# Patient Record
Sex: FEMALE | Race: WHITE
[De-identification: names, ages, dates, MRNs, and addresses within clinical notes are randomized per-mention and may not be internally consistent; named-entity substitution may affect disease eponyms.]

---

## 2019-08-12 ENCOUNTER — HOSPITAL ENCOUNTER (EMERGENCY)
Dept: HOSPITAL 97 - ER | Age: 69
Discharge: HOME | End: 2019-08-12
Payer: COMMERCIAL

## 2019-08-12 VITALS — TEMPERATURE: 98.3 F | DIASTOLIC BLOOD PRESSURE: 66 MMHG | SYSTOLIC BLOOD PRESSURE: 138 MMHG

## 2019-08-12 VITALS — OXYGEN SATURATION: 96 %

## 2019-08-12 DIAGNOSIS — J45.909: ICD-10-CM

## 2019-08-12 DIAGNOSIS — I48.91: ICD-10-CM

## 2019-08-12 DIAGNOSIS — K57.32: Primary | ICD-10-CM

## 2019-08-12 DIAGNOSIS — E11.9: ICD-10-CM

## 2019-08-12 DIAGNOSIS — Z88.5: ICD-10-CM

## 2019-08-12 DIAGNOSIS — I10: ICD-10-CM

## 2019-08-12 DIAGNOSIS — E03.9: ICD-10-CM

## 2019-08-12 LAB
ALBUMIN SERPL BCP-MCNC: 3.5 G/DL (ref 3.4–5)
ALP SERPL-CCNC: 63 U/L (ref 45–117)
ALT SERPL W P-5'-P-CCNC: 29 U/L (ref 12–78)
AST SERPL W P-5'-P-CCNC: 18 U/L (ref 15–37)
BUN BLD-MCNC: 14 MG/DL (ref 7–18)
GLUCOSE SERPLBLD-MCNC: 124 MG/DL (ref 74–106)
HCT VFR BLD CALC: 35.6 % (ref 36–45)
INR BLD: 1.09
LYMPHOCYTES # SPEC AUTO: 1.5 K/UL (ref 0.7–4.9)
MAGNESIUM SERPL-MCNC: 2.1 MG/DL (ref 1.8–2.4)
NT-PROBNP SERPL-MCNC: 3189 PG/ML (ref ?–125)
PMV BLD: 8.2 FL (ref 7.6–11.3)
POTASSIUM SERPL-SCNC: 3.7 MMOL/L (ref 3.5–5.1)
RBC # BLD: 4.05 M/UL (ref 3.86–4.86)
TROPONIN (EMERG DEPT USE ONLY): < 0.02 NG/ML (ref 0–0.04)
UA COMPLETE W REFLEX CULTURE PNL UR: (no result)

## 2019-08-12 PROCEDURE — 81015 MICROSCOPIC EXAM OF URINE: CPT

## 2019-08-12 PROCEDURE — 83880 ASSAY OF NATRIURETIC PEPTIDE: CPT

## 2019-08-12 PROCEDURE — 99285 EMERGENCY DEPT VISIT HI MDM: CPT

## 2019-08-12 PROCEDURE — 96361 HYDRATE IV INFUSION ADD-ON: CPT

## 2019-08-12 PROCEDURE — 81003 URINALYSIS AUTO W/O SCOPE: CPT

## 2019-08-12 PROCEDURE — 87088 URINE BACTERIA CULTURE: CPT

## 2019-08-12 PROCEDURE — 85025 COMPLETE CBC W/AUTO DIFF WBC: CPT

## 2019-08-12 PROCEDURE — 84484 ASSAY OF TROPONIN QUANT: CPT

## 2019-08-12 PROCEDURE — 80076 HEPATIC FUNCTION PANEL: CPT

## 2019-08-12 PROCEDURE — 85610 PROTHROMBIN TIME: CPT

## 2019-08-12 PROCEDURE — 87186 SC STD MICRODIL/AGAR DIL: CPT

## 2019-08-12 PROCEDURE — 71045 X-RAY EXAM CHEST 1 VIEW: CPT

## 2019-08-12 PROCEDURE — 93005 ELECTROCARDIOGRAM TRACING: CPT

## 2019-08-12 PROCEDURE — 96365 THER/PROPH/DIAG IV INF INIT: CPT

## 2019-08-12 PROCEDURE — 36415 COLL VENOUS BLD VENIPUNCTURE: CPT

## 2019-08-12 PROCEDURE — 87086 URINE CULTURE/COLONY COUNT: CPT

## 2019-08-12 PROCEDURE — 74177 CT ABD & PELVIS W/CONTRAST: CPT

## 2019-08-12 PROCEDURE — 96368 THER/DIAG CONCURRENT INF: CPT

## 2019-08-12 PROCEDURE — 80048 BASIC METABOLIC PNL TOTAL CA: CPT

## 2019-08-12 PROCEDURE — 87077 CULTURE AEROBIC IDENTIFY: CPT

## 2019-08-12 PROCEDURE — 83735 ASSAY OF MAGNESIUM: CPT

## 2019-08-12 PROCEDURE — 96375 TX/PRO/DX INJ NEW DRUG ADDON: CPT

## 2019-08-12 NOTE — ER
Nurse's Notes                                                                                     

 [facility]                                                                 

Name: Jada Vazquez                                                                             

Age: 68 yrs                                                                                       

Sex: Female                                                                                       

: 1950                                                                                   

MRN: [id number]                                                                                   

Arrival Date: 2019                                                                          

Time: 12:25                                                                                       

Account#: [id number]                                                                            

Bed 28                                                                                            

Private MD: Sammy Pritchett B                                                                     

Diagnosis: Lower abdominal pain, unspecified;Diverticulitis of large intestine without perforation

  or abscess without bleeding                                                                     

                                                                                                  

Presentation:                                                                                     

                                                                                             

12:38 Presenting complaint: Patient states: yesterday afternoon, i started having lower abd   hj  

      pain and then last night it got bad, today, its lower R Q pain right at the top of my       

      thigh; reports nausea;. Transition of care: patient was not received from another           

      setting of care. Onset of symptoms was 2019. Risk Assessment: Do you want to     

      hurt yourself or someone else? Patient reports no desire to harm self or others.            

      Initial Sepsis Screen: Does the patient meet any 2 criteria? No. Patient's initial          

      sepsis screen is negative. Does the patient have a suspected source of infection? No.       

      Patient's initial sepsis screen is negative. Care prior to arrival: None.                   

12:38 Method Of Arrival: Ambulatory                                                             

12:38 Acuity: JOHNNY 3                                                                           hj  

                                                                                                  

Historical:                                                                                       

- Allergies:                                                                                      

12:40 Codeine;                                                                                hj  

12:40 Demerol;                                                                                hj  

12:40 Talwin (Anaphylaxis);                                                                   hj  

- PMHx:                                                                                           

12:40 Atrial Fib; Diabetes - NIDDM; Hypertension; Hypothyroidism; Reactive airway disease;    hj  

- PSHx:                                                                                           

12:40 bilateral knees; right ankle; right wrist; Cholecystectomy; ; Hernia repair;   hj  

                                                                                                  

- Immunization history:: Adult Immunizations up to date.                                          

- Social history:: Smoking status: unknown.                                                       

- Ebola Screening: : No symptoms or risks identified at this time.                                

                                                                                                  

                                                                                                  

Screenin:30 Abuse screen: Denies threats or abuse. Denies injuries from another. Nutritional        rv  

      screening: No deficits noted. Tuberculosis screening: No symptoms or risk factors           

      identified. Fall Risk None identified.                                                      

                                                                                                  

Assessment:                                                                                       

14:32 General: Appears in no apparent distress. comfortable, Behavior is calm, cooperative.   rv  

      Pain: Complains of pain in right low back and right lower quadrant. Neuro: Level of         

      Consciousness is awake, alert, obeys commands, Oriented to person, place, time,             

      situation. Cardiovascular: Patient's skin is warm and dry. Respiratory: Airway is           

      patent. GI: No signs and/or symptoms were reported involving the gastrointestinal           

      system. : No signs and/or symptoms were reported regarding the genitourinary system.      

      EENT: No signs and/or symptoms were reported regarding the EENT system. Derm: Skin is       

      intact. Musculoskeletal: No signs and/or symptoms reported regarding the                    

      musculoskeletal system.                                                                     

16:49 Reassessment: Patient appears in no apparent distress at this time. Patient and/or      rv  

      family updated on plan of care and expected duration. Pain level reassessed. Patient is     

      alert, oriented x 3, equal unlabored respirations, skin warm/dry/pink.                      

                                                                                                  

Vital Signs:                                                                                      

12:41  / 70; Pulse 87; Resp 18; Temp 98.6(TE); Pulse Ox 95% on R/A; Weight 101.15 kg;   hj  

      Height 5 ft. 3 in. (160.02 cm); Pain 5/10;                                                  

14:00  / 61; Pulse 79; Resp 15; Pulse Ox 96% ;                                          rv  

15:00  / 62; Pulse 83; Resp 15; Pulse Ox 96% on R/A;                                    rv  

17:12  / 66; Pulse 81; Resp 15; Temp 98.3; Pulse Ox 96% on R/A;                         rv  

12:41 Body Mass Index 39.50 (101.15 kg, 160.02 cm)                                              

                                                                                                  

ED Course:                                                                                        

12:25 Patient arrived in ED.                                                                  rg4 

12:27 Sammy Pritchett MD is Private Physician.                                                rg4 

12:36 Francisca Cummins FNP-C is [facility].                                                        snw 

12:36 Mike Ortiz MD is Attending Physician.                                                snw 

12:39 Triage completed.                                                                       hj  

12:41 Arm band placed on left wrist.                                                          hj  

13:37 Radiology exam delayed due to lab results not completed at this time. (BUN/Creatinine)  [id number] 

      IV insertion attempt and/or patient not having appropriate IV at this time.                 

13:56 Prince Roth, RN is Primary Nurse.                                                  rv  

14:14 Initial lab(s) drawn, by me, sent to lab. Inserted saline lock: 20 gauge in left        iw  

      antecubital area, using aseptic technique. Blood collected.                                 

14:18 EKG done, by EKG tech. reviewed by Francisca MEDELLIN.                               [id number] 

14:30 Patient has correct armband on for positive identification. Bed in low position. Call   rv  

      light in reach. Side rails up X 1. Cardiac monitor on. Pulse ox on. NIBP on.                

14:31 Radiology exam delayed due to lab results not completed at this time. (BUN/Creatinine). nj  

14:58 X-ray completed. Portable x-ray completed in exam room. Patient tolerated procedure     mh1 

      well.                                                                                       

15:00 XRAY Chest (1 view) In Process Unspecified.                                             EDMS

15:08 Patient moved to CT.                                                                    2 

15:30 CT Abd/Pelvis - IV Contrast Only In Process Unspecified.                                EDMS

17:12 No provider procedures requiring assistance completed. IV discontinued, intact,         rv  

      bleeding controlled, No redness/swelling at site. Pressure dressing applied.                

                                                                                                  

Administered Medications:                                                                         

02:25 Drug: Phenergan 6.25 mg Route: IVP; Site: left antecubital;                             rv  

17:11 Follow up: Response: No adverse reaction                                                rv  

14:31 Drug: NS 0.9% 1000 ml Route: IV; Rate: 75 ml/hr; Site: left antecubital;                rv  

17:11 Follow up: IV Status: Completed infusion                                                rv  

16:04 Drug: Ciprofloxacin 400 mg Volume: 200 ml; Route: IVPB; Infused Over: 60 mins; Site:    rv  

      left antecubital;                                                                           

17:10 Follow up: IV Status: Completed infusion; IV Intake: 200ml                              rv  

16:04 Drug: Flagyl 500 mg Volume: 100 ml; Route: IVPB; Rate: 200 ml/hr; Infused Over: 30      rv  

      mins; Site: left antecubital;                                                               

17:10 Follow up: IV Status: Completed infusion; IV Intake: 100ml                              rv  

16:04 Drug: NS 0.9% 500 ml Route: IV; Rate: bolus; Site: left antecubital;                    rv  

17:10 Follow up: IV Status: Completed infusion; IV Intake: 500ml                              rv  

                                                                                                  

                                                                                                  

Intake:                                                                                           

17:10 IV: 500ml; Total: 500ml.                                                                rv  

17:10 IV: 100ml; Total: 600ml.                                                                rv  

17:10 IV: 200ml; Total: 800ml.                                                                rv  

                                                                                                  

Outcome:                                                                                          

16:07 Discharge ordered by MD. mancia 

17:12 Discharged to home ambulatory.                                                          rv  

17:12 Condition: good                                                                             

17:12 Discharge instructions given to patient, Instructed on discharge instructions, follow       

      up and referral plans. medication usage, Demonstrated understanding of instructions,        

      follow-up care, medications, Prescriptions given X 3.                                       

17:13 Patient left the ED.                                                                    rv  

                                                                                                  

Signatures:                                                                                       

Dispatcher [employer]                           EDMS                                                 

Francisca Cummins, FNP-C                 FNP-Csnw                                                  

Micki De La Cruz                               1                                                  

Zulma Peña, RN                     RN   Saji Shields RN                      Erika Carrasco                                 4                                                  

Ricardo Alejandro Victoria                            2                                                  

Ana Iglesias                              3                                                  

Prince Roth RN RN rv Garcia, Jessica j6                                                  

                                                                                                  

Corrections: (The following items were deleted from the chart)                                    

12:42 12:41 Pulse 87bpm; Resp 18bpm; Pulse Ox 95% RA; Temp 98.6F Temporal; 101.15 kg; Height  hj  

      5 ft. 3 in.; BMI: 39.5; Pain 5/10; hj                                                       

                                                                                                  

**************************************************************************************************

## 2019-08-12 NOTE — EDPHYS
Physician Documentation                                                                           

 [facility]                                                                 

Name: Jada Vazquez                                                                             

Age: 68 yrs                                                                                       

Sex: Female                                                                                       

: 1950                                                                                   

MRN: [id number]                                                                                   

Arrival Date: 2019                                                                          

Time: 12:25                                                                                       

Account#: [id number]                                                                            

Bed 28                                                                                            

Private MD: Sammy Pritchett B                                                                     

ED Physician Mike Ortiz                                                                         

HPI:                                                                                              

                                                                                             

14:21 This 68 yrs old  Female presents to ER via Ambulatory with complaints of Flank snw 

      Pain.                                                                                       

14:21 The patient complains of pain in the right low back. Location: right upper thigh.       snw 

      Onset: The symptoms/episode began/occurred suddenly, 2 day(s) ago, and became worse         

      today, and became persistent. Associated signs and symptoms: Pertinent positives:           

      nausea, Pertinent negatives: fever, vomiting. Severity of pain: At its worst the pain       

      was moderate severe. The patient has experienced similar episodes in the past, hx of        

      sanjiv, hernia repair, and c/s. The patient has not recently seen a physician, appt with     

      GI tomorrow in [place].                                                                     

                                                                                                  

Historical:                                                                                       

- Allergies:                                                                                      

12:40 Codeine;                                                                                hj  

12:40 Demerol;                                                                                hj  

12:40 Talwin (Anaphylaxis);                                                                   hj  

- PMHx:                                                                                           

12:40 Atrial Fib; Diabetes - NIDDM; Hypertension; Hypothyroidism; Reactive airway disease;    hj  

- PSHx:                                                                                           

12:40 bilateral knees; right ankle; right wrist; Cholecystectomy; ; Hernia repair;   hj  

                                                                                                  

- Immunization history:: Adult Immunizations up to date.                                          

- Social history:: Smoking status: unknown.                                                       

- Ebola Screening: : No symptoms or risks identified at this time.                                

                                                                                                  

                                                                                                  

ROS:                                                                                              

14:19 Constitutional: Negative for fever, chills, and weight loss, Eyes: Negative for injury, snw 

      pain, redness, and discharge, ENT: Negative for injury, pain, and discharge, Neck:          

      Negative for injury, pain, and swelling, Cardiovascular: Negative for chest pain,           

      palpitations, and edema, Respiratory: Negative for shortness of breath, cough,              

      wheezing, and pleuritic chest pain, Back: Negative for injury and pain, : Negative        

      for injury, bleeding, discharge, and swelling, MS/Extremity: Negative for injury and        

      deformity, Skin: Negative for injury, rash, and discoloration, Neuro: Negative for          

      headache, weakness, numbness, tingling, and seizure, Psych: Negative for depression,        

      anxiety, suicide ideation, homicidal ideation, and hallucinations.                          

14:19 Abdomen/GI: Positive for abdominal pain, nausea, abdominal distension, of the right         

      lower quadrant.                                                                             

                                                                                                  

Exam:                                                                                             

14:19 Constitutional:  This is a well developed, well nourished patient who is awake, alert,  snw 

      and in no acute distress. Head/Face:  Normocephalic, atraumatic. Eyes:  Pupils equal        

      round and reactive to light, extra-ocular motions intact.  Lids and lashes normal.          

      Conjunctiva and sclera are non-icteric and not injected.  Cornea within normal limits.      

      Periorbital areas with no swelling, redness, or edema.                                      

14:19 Neck:  Trachea midline, no thyromegaly or masses palpated, and no cervical                  

      lymphadenopathy.  Supple, full range of motion without nuchal rigidity, or vertebral        

      point tenderness.  No Meningismus. Chest/axilla:  Normal chest wall appearance and          

      motion.  Nontender with no deformity.  No lesions are appreciated. Cardiovascular:          

      Regular rate and rhythm with a normal S1 and S2.  No gallops, murmurs, or rubs.  Normal     

      PMI, no JVD.  No pulse deficits. Respiratory:  Lungs have equal breath sounds               

      bilaterally, clear to auscultation and percussion.  No rales, rhonchi or wheezes noted.     

       No increased work of breathing, no retractions or nasal flaring.                           

14:19 Back:  No spinal tenderness.  No costovertebral tenderness.  Full range of motion.          

      Skin:  Warm, dry with normal turgor.  Normal color with no rashes, no lesions, and no       

      evidence of cellulitis. MS/ Extremity:  Pulses equal, no cyanosis.  Neurovascular           

      intact.  Full, normal range of motion. Neuro:  Awake and alert, GCS 15, oriented to         

      person, place, time, and situation.  Cranial nerves II-XII grossly intact.  Motor           

      strength 5/5 in all extremities.  Sensory grossly intact.  Cerebellar exam normal.          

      Normal gait.                                                                                

14:19 ENT: Mouth: Oral mucosa: dry.                                                               

14:19 Abdomen/GI: Inspection: distension, that is moderate, Bowel sounds: hyperactive, in the     

      right lower quadrant, Palpation: severe abdominal tenderness, in the right lower            

      quadrant.                                                                                   

                                                                                                  

Vital Signs:                                                                                      

12:41  / 70; Pulse 87; Resp 18; Temp 98.6(TE); Pulse Ox 95% on R/A; Weight 101.15 kg;   hj  

      Height 5 ft. 3 in. (160.02 cm); Pain 5/10;                                                  

14:00  / 61; Pulse 79; Resp 15; Pulse Ox 96% ;                                          rv  

15:00  / 62; Pulse 83; Resp 15; Pulse Ox 96% on R/A;                                    rv  

17:12  / 66; Pulse 81; Resp 15; Temp 98.3; Pulse Ox 96% on R/A;                         rv  

12:41 Body Mass Index 39.50 (101.15 kg, 160.02 cm)                                            hj  

                                                                                                  

MDM:                                                                                              

13:39 Patient medically screened.                                                             snw 

16:03 Data reviewed: vital signs, nurses notes, lab test result(s), EKG, radiologic studies.  snw 

      Data interpreted: Pulse oximetry: on room air is 95 %. Interpretation: acceptable.          

      Counseling: I had a detailed discussion with the patient and/or guardian regarding: the     

      historical points, exam findings, and any diagnostic results supporting the                 

      discharge/admit diagnosis, the presence of at least one elevated blood pressure reading     

      (>120/80) during this emergency department visit, lab results, radiology results, the       

      need for outpatient follow up, a gastroenterologist, to return to the emergency             

      department if symptoms worsen or persist or if there are any questions or concerns that     

      arise at home. Awaiting: abx infusion. Special discussion: Based on the patient's Hx,       

      exam, and Dx evaluation, there is no indication for emergent surgery or inpatient Tx.       

      It is understood by the patient/guardian that if the Sx's persist or worsen they need       

      to return immediately for re-evaluation. Based on the history and exam findings, there      

      is no indication for further emergent testing or inpatient evaluation. I discussed with     

      the patient/guardian the need to see the gastroenterologist for further evaluation of       

      the symptoms. ED course: pt with GI appt tomorrow in [place]. Discussed po outpatient       

      antibiotics and follow up versus admission to hospital. Pt opts to go home after            

      initial IV abx with po outpatient. Pt agrees to return to ED immediately for worsening      

      s/s.                                                                                        

                                                                                                  

                                                                                             

13:33 Order name: Urine Culture                                                               snw 

                                                                                             

13:33 Order name: Urine Microscopic Only; Complete Time: 16:31                                snw 

                                                                                             

13:33 Order name: Basic Metabolic Panel; Complete Time: 14:56                                 snw 

                                                                                             

13:33 Order name: CBC with Diff; Complete Time: 14:35                                         snw 

                                                                                             

13:33 Order name: LFT's; Complete Time: 14:56                                                 snw 

                                                                                             

13:33 Order name: Magnesium; Complete Time: 14:56                                             snw 

                                                                                             

13:33 Order name: NT PRO-BNP; Complete Time: 14:56                                            snw 

                                                                                             

13:33 Order name: PT-INR; Complete Time: 14:41                                                snw 

                                                                                             

13:33 Order name: Troponin (emerg Dept Use Only); Complete Time: 14:56                        snw 

                                                                                             

13:33 Order name: XRAY Chest (1 view); Complete Time: 15:41                                   snw 

                                                                                             

13:33 Order name: CT Abd/Pelvis - IV Contrast Only; Complete Time: 15:46                      snw 

                                                                                             

16:11 Order name: Urine Dipstick--Ancillary (enter results); Complete Time: 16:26             bd  

                                                                                             

13:33 Order name: EKG; Complete Time: 13:37                                                   snw 

                                                                                             

13:33 Order name: Cardiac monitoring; Complete Time: 14:27                                    snw 

                                                                                             

13:33 Order name: EKG - Nurse/Tech; Complete Time: 14:15                                      snw 

                                                                                             

13:33 Order name: IV Saline Lock; Complete Time: 14:15                                        snw 

                                                                                             

13:33 Order name: Labs collected and sent; Complete Time: 14:15                               snw 

                                                                                             

13:33 Order name: O2 Per Protocol; Complete Time: 14:15                                       snw 

                                                                                             

13:33 Order name: O2 Sat Monitoring; Complete Time: 14:15                                     snw 

                                                                                                  

Administered Medications:                                                                         

02:25 Drug: Phenergan 6.25 mg Route: IVP; Site: left antecubital;                             rv  

17:11 Follow up: Response: No adverse reaction                                                rv  

14:31 Drug: NS 0.9% 1000 ml Route: IV; Rate: 75 ml/hr; Site: left antecubital;                rv  

17:11 Follow up: IV Status: Completed infusion                                                rv  

16:04 Drug: Ciprofloxacin 400 mg Volume: 200 ml; Route: IVPB; Infused Over: 60 mins; Site:    rv  

      left antecubital;                                                                           

17:10 Follow up: IV Status: Completed infusion; IV Intake: 200ml                              rv  

16:04 Drug: Flagyl 500 mg Volume: 100 ml; Route: IVPB; Rate: 200 ml/hr; Infused Over: 30      rv  

      mins; Site: left antecubital;                                                               

17:10 Follow up: IV Status: Completed infusion; IV Intake: 100ml                              rv  

16:04 Drug: NS 0.9% 500 ml Route: IV; Rate: bolus; Site: left antecubital;                    rv  

17:10 Follow up: IV Status: Completed infusion; IV Intake: 500ml                              rv  

                                                                                                  

                                                                                                  

Disposition:                                                                                      

                                                                                             

07:23 Co-signature as Attending Physician, Mike Ortiz MD.                                    rn  

                                                                                                  

Disposition:                                                                                      

19 16:07 Discharged to Home. Impression: Lower abdominal pain, unspecified,                 

  Diverticulitis of large intestine without perforation or abscess without                        

  bleeding.                                                                                       

- Condition is Stable.                                                                            

- Discharge Instructions: Abdominal Pain, Adult, Fat and Cholesterol Restricted Diet,             

  Diverticulitis, Hypertension.                                                                   

- Prescriptions for Flagyl 500 mg Oral Tablet - take 1 tablet by ORAL route every 12              

  hours for 7 days; 14 tablet. Cipro 500 mg Oral Tablet - take 1 tablet by ORAL route             

  every 12 hours for 7 days; 14 tablet. promethazine 25 mg Oral Tablet - take 1 tablet            

  by ORAL route every 6 hours As needed; 20 tablet.                                               

- Medication Reconciliation Form, Thank You Letter, Antibiotic Education, Prescription            

  Opioid Use form.                                                                                

- Follow up: Private Physician; When: as scheduled tomorrow; Reason: Recheck today's              

  complaints, Continuance of care, Re-evaluation by your physician.                               

                                                                                                  

                                                                                                  

                                                                                                  

Signatures:                                                                                       

Dispatcher [employer]                           EDMS                                                 

Francisca Cummins, [id number]                 FNP-Csnw                                                  

Mike Ortiz MD MD rn Joaquin, Henry, RN RN hj Vicente, Ronaldo, RN                    RN   rv                                                   

                                                                                                  

Corrections: (The following items were deleted from the chart)                                    

                                                                                             

17:13 16:07 2019 16:07 Discharged to Home. Impression: Lower abdominal pain,            rv  

      unspecified; Diverticulitis of large intestine without perforation or abscess without       

      bleeding. Condition is Stable. Forms are Medication Reconciliation Form, Thank You          

      Letter, Antibiotic Education, Prescription Opioid Use. Follow up: Private Physician;        

      When: as scheduled tomorrow; Reason: Recheck today's complaints, Continuance of care,       

      Re-evaluation by your physician. snw                                                        

                                                                                                  

**************************************************************************************************

## 2019-08-12 NOTE — XMS REPORT
Clinical Summary

 Created on:2019



Patient:Maria G Vazquez

Sex:Female

:1950

External Reference #:[id number]





Demographics







 Address  [address] [place] [address]

 

 Mobile Phone  [phone]

 

 Home Phone  [phone]

 

 Email Address  [email or web address]

 

 Email Address  [email or web address]

 

 Preferred Language  English

 

 Marital Status  

 

 Worship Affiliation  Unknown

 

 Race  White

 

 Ethnic Group  Not  or 









Author







 Organization  [facility]

 

 Address  [address] [place] [address]









Support







 Name  Relationship  Address  Phone

 

 Maria G Vazquez  Spouse  [address]  [phone]



     [place] [address]  









Care Team Providers







 Name  Role  Phone

 

 Yao Pritchett MD  Primary Care Provider  [phone]









Allergies







 Active Allergy  Reactions  Severity  Noted Date  Comments

 

 Codeine  GI Intolerance    06/15/2016  Vomiting

 

 Codeine-Cpm-Pe-Acetaminophen  GI Intolerance    2016  

 

 Meperidine  Other (See Comments)    2016  

 

 Pentazocine  Other (See Comments)    06/15/2016  Hallucinations

 

 Pentazocine Lactate  Other (See Comments)    2016  Hallucinations







Medications







 Medication  Sig  Dispensed  Refills  Start Date  End Date  Status

 

 metoprolol  Take 50 mg by    0      Active



 succinate XL  mouth daily.          



 (TOPROL-XL) 100 MG            



 24 hr tablet            

 

 apixaban (ELIQUIS)  Take 2.5 mg by    0      Active



 5 mg tablet  mouth 2 (two)          



   times a day.          

 

 levothyroxine  Take 125 mcg    0      Active



 (SYNTHROID,  by mouth          



 LEVOTHROID) 125 MCG  daily.          



 tablet            

 

 montelukast  Take 10 mg by    3  2017    Active



 (SINGULAIR) 10 mg  mouth daily          



 tablet  before          



   breakfast.          

 

 sertraline (ZOLOFT)  Take 50 mg by    0      Active



 50 MG tablet  mouth daily.          

 

 irbesartan (AVAPRO)  Take 150 mg by    0      Active



 150 MG tablet  mouth daily.          

 

 furosemide (LASIX)  TAKE 2 TABLETS  180 tablet  1  03/15/2019    Active



 20 mg tablet  BY MOUTH EVERY          



   DAY          

 

 allopurinol  TAKE 1 TABLET    0  2019    Active



 (ZYLOPRIM) 100 MG  BY MOUTH TWICE          



 tablet  A DAY. STOP          



   ULORIC          

 

 diltiazem CD  Take 360 mg by    1  2019    Active



 (CardIZEM CD) 360  mouth daily.          



 MG 24 hr capsule            

 

 flecainide  Take 100 mg by    1  2019    Active



 (TAMBOCOR) 100 MG  mouth 2 (two)          



 tablet  times a day.          

 

 traMADol (ULTRAM)  Take 50 mg by    0  2019    Active



 50 mg tablet  mouth 3          



   (three) times          



   a day as          



   needed.          

 

 fenofibrate  TAKE ONE TAB    6  2019    Active



 (TRICOR) 145 MG  ONCE A DAY          



 tablet            

 

 omega-3s-dha-epa-fi  Take by mouth    0      Active



 sh oil 1,000-1,400  daily.          



 mg capsule,delayed            



 release(DR/EC)            

 

 CALCIUM CITRATE  Take 600 mg by    0      Active



 ORAL  mouth 2 (two)          



   times a day.          

 

 TURMERIC ORAL  Take by mouth    0      Active



   daily.          

 

 cholecalciferol,  Take by mouth    0      Active



 vitamin D3,  2 (two) times          



 (VITAMIN D3) 5,000  a day.          



 unit tablet            

 

 APPLE CIDER VINEGAR  Take by mouth    0      Active



 ORAL  daily.          

 

 diltiazem CD  Take 360 mg by    0  2017  10/31/201  Discontinued



 (CardIZEM CD) 360  mouth daily.        8  



 MG 24 hr capsule            

 

 furosemide (LASIX)  Take 2 tablets  180 tablet  2  2017  
Discontinued



 20 mg tablet  (40 mg total)        8  



   by mouth          



   daily.          

 

 flecainide  Take 1 tablet  60 tablet  3  2017  10/31/201  Discontinued



 (TAMBOCOR) 150 MG  (150 mg total)        8  



 tablet  by mouth 2          



   (two) times a          



   day.          

 

 furosemide (LASIX)  Take 2 tablets  180 tablet  1  2018  03/15/201  
Discontinued



 20 mg tablet  (40 mg total)        9  



   by mouth          



   daily.          

 

 flecainide  Take 75 mg by    0      Discontinued



 (TAMBOCOR) 50 MG  mouth 2 (two)        8  



 tablet  times a day.          

 

 traMADol (ULTRAM)  Take 0.5  15 tablet  0  2018  



 50 mg tablet  tablets (25 mg        8  



   total) by          



   mouth every 6          



   (six) hours as          



   needed for          



   moderate pain          



   for up to 30          



   doses.          

 

 flecainide  Take 1 tablet  60 tablet  0  2018  



 (TAMBOCOR) 100 MG  (100 mg total)        8  



 tablet  by mouth 2          



   (two) times a          



   day for 30          



   days.          

 

 minocycline  Take 1 capsule  6 capsule  0  2018  



 (MINOCIN,DYNACIN)  (100 mg total)        8  



 100 MG capsule  by mouth 2          



   (two) times a          



   day for 6          



   doses.          

 

 promethazine  Take 1 tablet  30 tablet  1  2019  



 (PHENERGAN) 25 MG  (25 mg total)        9  



 tablet  by mouth every          



   6 (six) hours          



   as needed for          



   nausea or          



   vomiting for          



   up to 30 days.          







Active Problems







 Problem  Noted Date

 

 History of cardiac pacemaker in situ  2019

 

 Sick sinus syndrome  10/31/2018

 

 SOB (shortness of breath)  10/09/2018

 

 Essential hypertension  10/09/2018

 

 Atrial fibrillation  2017

 

 Atrial fibrillation, currently in sinus rhythm  2016







Encounters







 Date  Type  Specialty  Care Team  Description

 

 2019  Telephone  Gastroenterology  Feliz Viera MD  

 

 2019  Orders Only  Gastroenterology  Damien Viera Steven, MD  unspecified type



         (Primary Dx)

 

 2019  Telephone  Gastroenterology  Karly Fonseca RN  

 

 2019  Telephone  Gastroenterology  Adele Herbert MA  

 

 2019  Surgery  Gastroenterology  Maximiliano  EGD WITH BIOPSY,



       MD Feliz  POLYPECTOMY, CLIPS



         APPLIED X3

 

 2019  Anesthesia Event  Gastroenterology  Andreas Molina CRNA  

 

 2019  Hospital Encounter  Gastroenterology  Feliz Viera MD  

 

 2019  Telephone  Gastroenterology  Karly Fonseca RN  

 

 2019  Telephone  Gastroenterology  Adele Herbert MA  

 

 2019  Telephone  Gastroenterology  Feliz Viera MD  

 

 2019  Office Visit  Gastroenterology  Juhi Viear iron



       MD Feliz  deficiency anemia



         (Primary Dx)

 

 03/15/2019  Refill  Cardiology  Justyn Cervantes  Med [facility]ank[facility]



       MD MAGNOLIA  

 

 2019  Office Visit  Cardiology  Justyn Cervantes  Atrial fibrillation, 
unspecified type (HCC) (Primary Dx);



       MD MAGNOLIA  History of cardiac pacemaker in situ

 

 10/31/2018  Anesthesia Event  Procedural Cardiology  Zeyad Kent CRNA  

 

 10/31/2018  Surgery  Procedural Cardiology  Malini Blue  Pacemaker insertion



       MD Eryn  new or replacement



         [88497 (CPT)]

 

 10/31/2018 -  Hospital Encounter  Cardiology  Malini Blue  Sick sinus syndrome



 2018      MD Eryn  (HCC)

 

 10/09/2018  Office Visit  Cardiology  Justyn Cervantes  Paroxysmal atrial 
fibrillation (HCC) (Primary Dx);



       MD MAGNOLIA  SOB (shortness of breath);



         Essential hypertension;



         Cardiomyopathy, unspecified type (HCC)

 

 2018  Refill  Cardiology  Leonardo Colin MA  



after 2018



Family History







 Medical History  Relation  Name  Comments

 

 No Known Problems  Father    

 

 Cancer  Mother    

 

 No Known Problems  Sister    









 Relation  Name  Status  Comments

 

 Father      

 

 Mother      

 

 Sister      







Social History







 Tobacco Use  Types  Packs/Day  Years Used  Date

 

 Never Smoker        









 Smokeless Tobacco: Never Used      









 Tobacco Cessation: Counseling Given: No









 Alcohol Use  Drinks/Week  oz/Week  Comments

 

 Yes      minimal









 Sex Assigned at Birth  Date Recorded

 

 Not on file  









 Job Start Date  Occupation  Industry

 

 Not on file  Not on file  Not on file









 Travel History  Travel Start  Travel End









 No recent travel history available.







Last Filed Vital Signs







 Vital Sign  Reading  Time Taken

 

 Blood Pressure  152/68  2019  8:08 AM CDT

 

 Pulse  60  2019  8:08 AM CDT

 

 Temperature  36.3 C (97.3 F)  2019  8:08 AM CDT

 

 Respiratory Rate  16  2019  8:08 AM CDT

 

 Oxygen Saturation  95%  2019  8:08 AM CDT

 

 Inhaled Oxygen Concentration  -  -

 

 Weight  105 kg (230 lb 14.4 oz)  2019  6:22 AM CDT

 

 Height  160 cm (5' 3")  2019  6:22 AM CDT

 

 Body Mass Index  40.9  2019  6:22 AM CDT







Plan of Treatment







 Date  Type  Specialty  Care Team  Description

 

 2019  Office Visit  Gastroenterology  Feliz Viera MD



  



       [address]



  



       [address]



  



       [place], [place] [address] [phone] (Fax)  

 

 2020  Office Visit  Cardiology  Justyn Cervantes MD



  



       [address]



  



       [address]



  



       [place], [place] [address] [phone] (Fax)  









 Health Maintenance  Due Date  Last Done  Comments

 

 BREAST CANCER SCREENING  1999    

 

 SHINGLES VACCINES (#1)  1999    

 

 65+ PNEUMOCOCCAL VACCINE (1 of 2 - PCV13)  2014    

 

 INFLUENZA VACCINE  2019    

 

 COLONOSCOPY SCREENING  2020  







Implants







 Implanted  Type  Area    Device  Shelf  Model /



         Identifier  Expiration  Serial /



           Date  Lot

 

 Accolade Mri  Pacemaker - [id number] - [id number]  Cardiac  N/A:  [place]      [id number] /



 Implanted: Qty: 1 on 10/31/2018 by Malini Blue Jr., MD  Pacemaker  N/A  
SCIENTIFIC- CRM      [id number] /



   Generators          [id number]

 

 Ingevity Mri Pacing Lead 45cm - [id number]  Cardiac Pacing  N/A:  [place]      7740 [id number] /



 Implanted: 10/31/2018 (Quantity not on file)  Leads or  N/A  "[employer]- CRM  
    [id number] /



   Electrodes or          [id number]



   Accessories          

 

 [facility]cm [facility] Mri Active Fixation Pacing Lead - [id number]  Cardiac Pacing  N/A
:  [place]    10/10/2020  7741 /



 Implanted: 10/31/2018 (Quantity not on file)  Leads or  N/A  [employer] CRM  
    [id number] /



   Electrodes or          [id number]



   Accessories          

 

 Clip Resolution 360 Mr Cndtl 235cm 2.8mm 11mm Opening - [id number]  Surgical  N
/A:  [facility] ENDOSCOPY      [id number] /



 Implanted: 2019 (Quantity not on file)  Implants;  N/A         /



   Expanders;          



   Extenders;          



   Surgical Wires          

 

 Clip Resolution 360 Mr Cndtl 235cm 2.8mm 11mm Opening - [id number]  Surgical  N
/A:  [facility] ENDOSCOPY      [id number] /



 Implanted: 2019 (Quantity not on file)  Implants;  N/A         /



   Expanders;          



   Extenders;          



   Surgical Wires          

 

 Clip Resolution 360 Mr Cndtl 235cm 2.8mm 11mm Opening - [id number]  Surgical  N
/A:  [facility] ENDOSCOPY      [id number] /



 Implanted: 2019 (Quantity not on file)  Implants;  N/A         /



   Expanders;          



   Extenders;          



   Surgical Wires          







Procedures







 Procedure Name  Priority  Date/Time  Associated  Comments



       Diagnosis  

 

 SURGICAL PATHOLOGY REQUEST  Routine  2019    Results for



     9:14 AM CDT    this procedure



         are in the



         results



         section.

 

 POC GLUCOSE  Routine  2019    Results for



     7:43 AM CDT    this procedure



         are in the



         results



         section.

 

 COLONOSCOPY    2019  Iron deficiency  



     7:00 AM CDT  anemia,  



       unspecified



  



       Rectal bleeding  

 

 ESOPHAGOGASTRODUODENOSCOPY    2019  Iron deficiency  



 (EGD)    7:00 AM CDT  anemia,  



       unspecified



  



       Rectal bleeding  

 

 POC GLUCOSE  Routine  2019    Results for



     6:55 AM CDT    this procedure



         are in the



         results



         section.

 

 ECHOCARDIOGRAM 2D COMPLETE W  Routine  2019  Paroxysmal atrial  Results 
for



 MMODE SPECTRAL COLOR DOPPLER    2:49 PM CST  fibrillation  this procedure



 (86992)      (HCC)



  are in the



       Cardiomyopathy,  results



       unspecified type  section.



       (HCC)  

 

 XR CHEST 1 VW PORTABLE  Routine  10/31/2018    Results for



     3:33 PM CDT    this procedure



         are in the



         results



         section.

 

 POC GLUCOSE  Routine  10/31/2018    Results for



     3:19 PM CDT    this procedure



         are in the



         results



         section.

 

 ECG 12-LEAD  STAT  10/31/2018    Results for



     2:49 PM CDT    this procedure



         are in the



         results



         section.

 

 EP PACEMAKER INSERTION NEW OR  Routine  10/31/2018  Sick sinus  Results for



 REPLACEMENT    2:28 PM CDT  syndrome (HCC)  this procedure



         are in the



         results



         section.

 

 TYPE AND SCREEN  STAT  10/31/2018    Results for



     12:08 PM CDT    this procedure



         are in the



         results



         section.

 

 ECG 12-LEAD  STAT  10/31/2018    Results for



     10:57 AM CDT    this procedure



         are in the



         results



         section.



after 2018



Results

Surgical pathology request (2019  9:14 AM CDT)





 Component  Value  Ref Range  Performed At  Pathologist



         Signature

 

 Case number  [id number]    [facility] DEPARTMENT OF  



       PATHOLOGY AND  



       GENOMIC MEDICINE  

 

 Surgical pathology  See link below    [facility] DEPARTMENT OF  



 report  for PDF Lab    PATHOLOGY AND  



   Report    GENOMIC MEDICINE  

 

 Result status  This is Final    [facility] DEPARTMENT OF  



   Report for    PATHOLOGY AND  



   [id number]    GENOMIC MEDICINE  









 Specimen

 

 









 Performing Organization  Address  City/[facility]/Zipcode  Phone Number

 

 [facility] DEPARTMENT OF PATHOLOGY AND  [address] [place] [address]  



 GENOMIC MEDICINE      



POC glucose (2019  7:43 AM CDT)Only the most recent of3 resultswithin the 
time period is included.





 Component  Value  Ref Range  Performed At  Pathologist Signature

 

 POC glucose  148 (H)  65 - 99 mg/dL  [facility]  



   Comment:    HOSPITAL  



   [facility] Notified RN      



   Meter ID: [id number]      



   : Luis Manuel Choi      



         









 Specimen

 

 









 Performing Organization  Address  City/State/Zipcode  Phone Number

 

 [facility] DEPARTMENT OF PATHOLOGY AND  [address] [place] [address]  



 GENOMIC MEDICINE      

 

 [address] [address]  



Echocardiogram complete w contrast and 3D if needed (2019  2:49 PM CST)





 Specimen

 

 









 Narrative  Performed At

 

  



   [facility]



  



  



  



 Echocardiography Report



  



  



  



 Pat.Name:Pretty VAZQUEZ.ID:[id number]  



  



  



 .Date: 2019 Refer.MD:JUSTYN CERVANTES MD



  



 Exam Time: 2:14:00 PMStudy Type:Routine  



 Echo



  



 Height:63inWeight:  



 226lb 



  



 BSA: 2.04 m2  



 DOBAge:1950,68Y 



  



 Sex:  



 FEMALEBP:135/97  



 



  



 HR:61 bpm



  



 Sonogrphr: SILVIA Conroy FASE Pat.  



 Stat.:OutpatientRoom:[place]  



 



  



 Study Status:Final 



  



 Echo Event ID:[id number]



  



 Order ID:[id number]



  



 Reason for Study:Atrial fibrillation



  



 History / Clinical:Atrial Fibrillation, Diabetes, Dizziness,



  



 Hypertension, Obesity, Ventricular Fibrilation



  



 Procedures:2D Echo, Colorflow Doppler



  



 Race:C 



  



 ------------------------------------



  



 FINDINGS:



  



 ------------------------------------



  



 LV: LV size is upper limits of normal. There is mild  



 concentric



  



 LVhypertrophy. LV EF is normal. Overall wall  



 motion is



  



 normal.Estimated EF is 55-59%.



  



 RV: RV size is normal. A pacemaker wire is seen in the RV.  



 RV



  



 systolicfunction is normal.



  



 LA: LA volume is mild to moderately enlarged.



  



 RA: RA volume is normal. A catheter or pacemaker wire is  



 seen.



  



 AO: Aortic root diameter is normal.



  



 BIMAL: No pericardial effusion.



  



 AV: Aortic valve sclerosis.



  



 MV: No structural MV abnormalities noted.



  



 PV: No structural PV abnormalities noted.



  



 TV: No structural TV abnormalities noted. A trace of  



 tricuspid



  



 regurgitation 



  



 Gutierrez: LV relaxation is impaired. LV filling pressure is  



 elevated.



  



 Other:Insufficient TR jet to estimate PA systolic pressure.



  



 ------------------------------------



  



 MEASUREMENTS:



  



 ------------------------------------



  



 2D



  



 Parasternal Long Axis



  



  LVOT 1.8 cmLA  



 Ds5.2 cm



  



  LVIDd5.3  



 cmIndex2.6  



 cm/m



  



  Ao An1.8 cm



  



  LVIDs4.1 cmAo  



 Rtd 2.9 cm



  



  Index1.4 cm/m



  



  LV%fs 23.9 % LV  



 Jizi187 g()



  



  IVSd 0.9 cmLVM  



 Index 80.4 g/m2



  



  LVPWd0.8  



 cmRWT0.3 



  



 LA Sng Plane



  



  LA Area 22.9 cm2(8.8-23.4) LA  



 Vol72.2 ml



  



  Index35.4 ml/m



  



  LA LngAx 6.2 cm



  



  



  



 Signed 01/15/2019 10:03 AM



  



 Alvina Beal M.D.  









 Procedure Note

 

 Interface, Radiology Results In - 01/15/2019 10:04 AM CST







                 [facility]



 



                         Echocardiography Report



 



 Pat.Name:  MARIA G VAZQUEZ.ID:    [id number]



 .Date:   2019               Refer.MD:  JUSTYN CERVANTES MD



 Exam Time: 2:14:00 PM              Study Type:Routine Echo



 Height:    63in                    Weight:    226lb



 BSA:       2.04 m2                   Age:  1950,68Y



 Sex:       FEMALE                  BP:        135/97



 HR:        61 bpm



 Sonogrphr: SILVIA Conroy FASE Pat. Stat.:Outpatient              Room:      [place]



 Study Status:Final



 Echo Event ID:[id number]



 Order ID:  [id number]



 Reason for Study:Atrial fibrillation



 History / Clinical:Atrial Fibrillation, Diabetes, Dizziness,



 Hypertension, Obesity, Ventricular Fibrilation



 Procedures:2D Echo, Colorflow Doppler



 Race:      C



 ------------------------------------



 FINDINGS:



 ------------------------------------



 LV:       LV size is upper limits of normal. There is mild concentric



           LV  hypertrophy. LV EF is normal. Overall wall motion is



           normal.  Estimated EF is 55-59%.



 RV:       RV size is normal. A pacemaker wire is seen in the RV. RV



           systolic  function is normal.



 LA:       LA volume is mild to moderately enlarged.



 RA:       RA volume is normal. A catheter or pacemaker wire is seen.



 AO:       Aortic root diameter is normal.



 BIMAL:     No pericardial effusion.



 AV:       Aortic valve sclerosis.



 MV:       No structural MV abnormalities noted.



 PV:       No structural PV abnormalities noted.



 TV:       No structural TV abnormalities noted. A trace of tricuspid



           regurgitation



 Gutierrez:     LV relaxation is impaired. LV filling pressure is elevated.



 Other:    Insufficient TR jet to estimate PA systolic pressure.



 ------------------------------------



 MEASUREMENTS:



 ------------------------------------



                                   2D



 Parasternal Long Axis



  LVOT             1.8 cm            LA Ds            5.2 cm



  LVIDd            5.3 cm            Index        2.6 cm/m



  Ao An            1.8 cm



  LVIDs            4.1 cm            Ao Rtd           2.9 cm



  Index        1.4 cm/m



  LV%fs           23.9 %             LV Mass          164 g    ()



  IVSd             0.9 cm            LVM Index       80.4 g/m2



  LVPWd            0.8 cm            RWT              0.3



 LA Sng Plane



  LA Area         22.9 cm2  (8.8-23.4) LA Vol          72.2 ml



  Index        35.4 ml/m



  LA LngAx         6.2 cm



 



 Signed 01/15/2019 10:03 AM



 Alvina Beal M.D.









 Performing Organization  Address  City/[facility]  Phone Number

 

  [facility]  [address] [place] [address]  



XR Chest 1 Vw Portable (10/31/2018  3:33 PM CDT)





 Specimen

 

 









 Narrative  Performed At

 

 EXAMINATION:XR CHEST 1 VW PORTABLE



   [facility]



  



  



 CLINICAL HISTORY:Pneumothorax



  



  



  



 COMPARISON:To a previous examination from 2016



  



  



  



 IMPRESSION:



  



  



  



 Transvenous pacemaker is present. The cardiomediastinal silhouette is  



 prominent. The lungs are hyperinflated, and clear.



  



  



  



 [facility]-[id number]



  



   









 Procedure Note

 

  Interface, Radiology Results Incoming - 10/31/2018  3:39 PM CDT



EXAMINATION:  XR CHEST 1 VW PORTABLE



 



 CLINICAL HISTORY:  Pneumothorax



 



 COMPARISON:  To a previous examination from 2016



 



 IMPRESSION:



 



 Transvenous pacemaker is present. The cardiomediastinal silhouette is 
prominent. The lungs are hyperinflated, and clear.



 



 [facility]-[id number]









 Performing Organization  Address  City/State/Zipcode  Phone Number

 

  [facility]  [address] [place] [address]  



ECG 12 lead (10/31/2018  2:49 PM CDT)Only the most recent of2 resultswithin the 
time period is included.





 Component  Value  Ref Range  Performed At  Pathologist



         Signature

 

 Ventricular rate  67    [facility]  

 

 Atrial rate  67    [facility]  

 

 NJ interval  224    [facility]  

 

 QRSD interval  128    [facility]  

 

 QT interval  456    [facility]  

 

 QTC interval  481    [facility]  

 

 P axis 1  39    [facility]  

 

 QRS axis 1  -59    [facility]  

 

 T wave axis  28    [facility]  

 

 EKG impression  Sinus rhythm with 1st degree AV block-Left axis deviation-
Nonspecific intraventricular block-Anterolateral infarct , age undetermined-
Abnormal ECG-In automated comparison with ECG of 31-OCT-2018 10:57,-Anterior 
infarct is now present-Anterolateral    [facility]  



   infarct is now present-No significant change was found-Electronically Signed 
By Luis Leach ([id number]) on 2018 8:23:28 AM      



         









 Specimen

 

 









 Performing Organization  Address  City/[facility]/Zipcode  Phone Number

 

 [facility]  [address] [place] [address]  



Cv electrophysiology procedure (10/31/2018  2:28 PM CDT)





 Specimen

 

 









 Narrative  Performed At

 

 This result has an attachment that is not available.



TITLE:  [facility]



 Dual-chamber pacemaker placement.  



 PREOPERATIVE DIAGNOSES:  



 1.Sick sinus syndrome.  



 2.Paroxysmal atrial fibrillation.  



 3.Symptomatic bradycardia.  



 4.Remote syncope.  



 POSTOPERATIVE DIAGNOSES:  



 1.Sick sinus syndrome.  



 2.Paroxysmal atrial fibrillation.  



 3.Symptomatic bradycardia.  



 4.Remote syncope.  



 PROCEDURES PERFORMED:  



 1.Monitored anesthesia care.  



 2.Left upper extremity venogram.  



 3.Dual-chamber pacemaker placement.  



 BRIEF HISTORY OF PRESENT ILLNESS AND CLINICAL BACKGROUND:  



 This is a 68-year-old woman with the aforementioned medical problems.She  



 was  



 [facility] [place] where she  



 had  



 symptomatic bradycardia.Her physicians there called me and we discussed  



 the  



 likelihood that she may need a pacemaker or modification of her  



 antiarrhythmic  



 medications.Unfortunately, modifying her medications would lead to more  



 episodes of atrial fibrillation.  



 We discussed the pros and cons of an ablation of her AFib versus a  



 permanent  



 pacemaker and up titration of her medications, which is the option that  



 she  



 selected, so she comes today for insertion of a dual-chamber pacemaker.  



 PROCEDURE TYPE:  



 Informed consent was obtained.Intravenous antibiotics were infused  



 appropriately.The chest was prepped and draped in the usual sterile  



 fashion  



 and local anesthesia was achieved with 1% lidocaine.An upper extremity  



 venogram was performed to identify the location of the axillary and  



 subclavian  



 vein and access was achieved.Guide wires were introduced under  



 fluoroscopic  



 guidance and advanced into the inferior vena cava.  



 Using a sharp knife, cautery, and blunt dissection, a pocket was formed  



 below  



 the plane of the pectoralis fascia.The RV and atrial leads were placed  



 into  



 the venous system using standard technique.The RV pace/sense lead was  



 placed  



 into the RV apex and tested.After the thresholds were deemed adequate  



 the lead  



 was anchored in place.Maximum output pacing was performed to ensure that  



 there  



 was no diaphragmatic stimulation, and none was seen.The lead was  



 anchored in  



 place using 0-Ethibond sutures around the lead collar.  



 A bipolar screw-in lead was affixed into the right atrium.Sensing and  



 pacing  



 thresholds were then performed.After they were found to be adequate,  



 maximum  



 output pacing was performed to ensure that there was no diaphragmatic  



 stimulation, and none was seen.The lead was anchored in place using  



 0-Ethibond  



 sutures around the lead collar.  



 Fluoroscopy was repeated to ensure proper lead placement.The pacemaker  



 pocket  



 was visually, manually, and radiographically inspected to ensure there  



 were no  



 gauze or sponges in the pocket.It was then washed using antibiotic  



 solution.  



 Gloves and drapes were changed as needed.The pacemaker generator packet  



 was  



 then opened and was attached to the leads and the set screws were  



 tightened.  



 Each lead was gently tugged upon to ensure it was affixed within the  



 header.  



 After proper pacemaker function was confirmed, the lead slack and  



 pacemaker were  



 placed in the pocket.The pacemaker was anchored to the pectoralis muscle  



 using  



 0-Ethibond suture.The skin incision was then closed in layers using  



 0-Vicryl  



 sutures.Final skin closure was achieved with stainless steel staples and  



 skin  



 adhesive.  



 COMPLICATIONS:  



 None.  



 FINDINGS:  



 1.Estimated blood loss 5 mL.  



 2.The pacemaker is a [employer] Accolade, model L311, serial  



 #[id number].  



 3.The atrial lead is a [employer] Acumentricsevity MRI, model 7740,  



 serial  



 #[id number].Measured P-wave 3.8 mV, pacing threshold 1.2 V, current 2.3 mA,  



 impedance 532 ohms.  



 4.The ventricular lead is an [employer]ge[employer] MRI, model 7741, serial #[id number],  



 measured R-wave 18.5 mV, pacing threshold 0.8 V, current 1.0 mA, impedance  



 864  



 ohms.  



 CONCLUSIONS:  



 Successful dual-chamber pacemaker placement.  



 RECOMMENDATIONS:  



 1.Admit to observation on telemetry.  



 2.IV antibiotics.  



 3.Home tomorrow.  



 4.Uptitrate flecainide.  









 Performing Organization  Address  City/State/Zipcode  Phone Number

 

  [facility]  [id number] [place] [address]  



Type and screen (10/31/2018 12:08 PM CDT)





 Component  Value  Ref Range  Performed At  Pathologist Signature

 

 ABO grouping  B    [facility] DEPARTMENT OF  



       PATHOLOGY AND GENOMIC  



       MEDICINE  

 

 Rh type  POS    [facility] DEPARTMENT OF  



       PATHOLOGY AND GENOMIC  



       MEDICINE  

 

 Antibody screen (gel)  NEG    [facility] DEPARTMENT OF  



       PATHOLOGY AND GENOMIC  



       MEDICINE  









 Specimen

 

 Blood









 Performing Organization  Address  [facility]  Phone Number

 

 [facility] DEPARTMENT OF PATHOLOGY AND  [address] [place] [address]  



 GENOMIC MEDICINE      



after 2018



Insurance







 Payer  Benefit Plan /  Subscriber ID  Effective Dates  Phone  Address  Type



   Group          

 

 MEDICARE  MEDICARE PART A AND  xxxxxxxxxxx  2014-[place]



   B    t      

 

 AETNA  AETNA [facility]  xxxxxxxxx  [facility]      Indemnity



   INDEMNITY    t      









 Guarantor Name  Account Type  Relation to  Date of  Phone  Billing



     Patient  Birth    Address

 

 Maria G Vazquez  Personal/Family  Self  1950  [phone]  [address] [place] [address]







         (Home)  [place] [address]







Advance Directives

Patient has advance care planning documents on file. For more information, 
please contact:[address] [place] [address]

## 2019-08-12 NOTE — EKG
Test Date:    2019-08-12               Test Time:    14:11:13

Technician:   FREDDY                                     

                                                     

MEASUREMENT RESULTS:                                       

Intervals:                                           

Rate:         81                                     

AK:           214                                    

QRSD:         142                                    

QT:           408                                    

QTc:          473                                    

Axis:                                                

P:            54                                     

AK:           214                                    

QRS:          -65                                    

T:            67                                     

                                                     

INTERPRETIVE STATEMENTS:                                       

                                                     

Sinus rhythm with 1st degree AV block

Left axis deviation

Left bundle branch block

Abnormal ECG

Compared to ECG 10/30/2017 06:21:28

First degree AV block now present

Left bundle-branch block now present

Atrial premature complex(es) no longer present

Myocardial infarct finding no longer present



Electronically Signed On 08-12-19 14:19:36 CDT by Marco Antonio Wharton

## 2019-08-12 NOTE — RAD REPORT
EXAM DESCRIPTION:  RAD - Chest Single View - 8/12/2019 3:00 pm

 

CLINICAL HISTORY:  Abdominal pain

 

COMPARISON:  October 2017

 

TECHNIQUE:  AP portable chest image was obtained 1452 hours .

 

FINDINGS:  Lung volumes are low. No acute lung parenchymal process. Interstitial pattern is prominent
 but stable. Pacemaker has been placed since prior imaging. Heart and vasculature are normal. No carmen
urable pleural effusion and no pneumothorax. No acute bony abnormality seen. No acute aortic findings
 suspected.

 

IMPRESSION:  No acute cardiopulmonary process.

 

Chronic interstitial pattern matches comparison.

## 2019-08-12 NOTE — RAD REPORT
EXAM DESCRIPTION:  CT - Abdomen   Pelvis W Contrast - 8/12/2019 3:26 pm

 

CLINICAL HISTORY:  Abdominal pain, right lower quadrant pain, prior cholecystectomy and hernia repair


 

COMPARISON:  CT study June 2012

 

TECHNIQUE:  Biphasic, helical CT imaging of the abdomen and pelvis was performed following 100 ml non
-ionic IV contrast. No oral contrast administered.

 

All CT scans are performed using dose optimization technique as appropriate and may include automated
 exposure control or mA/KV adjustment according to patient size.

 

FINDINGS:  No acute lung base findings. Old rib fractures are noted. No pleural effusion. No pericard
ial effusion. The 17 millimeter rounded masses seen along the right lateral margin of the distal esop
hagus. Postsurgical changes are noted from hiatal hernia repair. This small round mass may be the seq
uela of the prior surgical procedure. Likelihood of an aggressive mass is felt to be low.

 

Fatty infiltration of the liver is present. No focal liver lesions seen. The splenomegaly is present 
at 15-16 cm. No focal splenic abnormality. Pancreas is unremarkable. Cholecystectomy clips are presen
t with no biliary tree dilatation.

 

Renal function is symmetric. There is incomplete rotation of the right kidney is a normal variant. No
 hydronephrosis, mass or other acute renal parenchymal process. No pyelonephritis or acute parenchyma
l process. No obstructing or nonobstructing calculi. No adrenal abnormalities.

 

No stomach or small bowel abnormality. The appendix is normal. From cecum through descending colon no
 acute colon process. Sigmoid colon is tortuous and redundant. There is an 8 centimeter long segment 
of distal sigmoid colon which is positioned in the lower right abdomen showing circumferential wall t
hickening. Inflammatory stranding is present in the surrounding fatty tissues.  No adjacent free air 
or pneumatosis. No abscess or extravasation of bowel content seen. The involved colon abuts the right
 ovary which is otherwise unremarkable.  Uterus and left ovary show no suspicious findings.

 

No suspicious bony findings.

 

 

IMPRESSION:  Acute diverticulitis or possibly nonspecific colitis involving the distal sigmoid colon.
  Patient has sigmoid colon that is tortuous and redundant placing the involved portion of colon in t
he lower right abdomen.

 

No abscess, perforation or other findings of surgically emergent nature. Follow-up colonoscopy after 
medical management could be performed to exclude low likelihood of malignancy.

 

Fatty infiltration of the liver.

 

Hiatal hernia surgical changes are present. A 17 millimeter round mass right lateral margin of the di
stal esophagus may be sequela of the prior surgery. Long-term significance is doubtful.

## 2023-06-26 ENCOUNTER — HOSPITAL ENCOUNTER (EMERGENCY)
Dept: HOSPITAL 97 - ER | Age: 73
LOS: 1 days | Discharge: ANOTHER HOSPITAL | End: 2023-06-27
Payer: COMMERCIAL

## 2023-06-26 DIAGNOSIS — E11.9: ICD-10-CM
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
## 2023-06-26 DIAGNOSIS — N17.9: ICD-10-CM

## 2023-06-26 DIAGNOSIS — Z88.5: ICD-10-CM

## 2023-06-26 DIAGNOSIS — Z95.0: ICD-10-CM

## 2023-06-26 DIAGNOSIS — E87.6: ICD-10-CM

## 2023-06-26 DIAGNOSIS — N13.2: Primary | ICD-10-CM

## 2023-06-26 DIAGNOSIS — K74.69: ICD-10-CM

## 2023-06-26 DIAGNOSIS — E66.01: ICD-10-CM

## 2023-06-26 DIAGNOSIS — R18.8: ICD-10-CM

## 2023-06-26 DIAGNOSIS — Z88.8: ICD-10-CM

## 2023-06-26 DIAGNOSIS — I10: ICD-10-CM

## 2023-06-26 LAB
ALBUMIN SERPL BCP-MCNC: 3.6 G/DL (ref 3.4–5)
ALP SERPL-CCNC: 93 U/L (ref 45–117)
ALT SERPL W P-5'-P-CCNC: 21 U/L (ref 13–56)
AST SERPL W P-5'-P-CCNC: 46 U/L (ref 15–37)
BILIRUB INDIRECT SERPL-MCNC: 0.2 MG/DL (ref 0.2–0.8)
BUN BLD-MCNC: 41 MG/DL (ref 7–18)
GLUCOSE SERPLBLD-MCNC: 136 MG/DL (ref 74–106)
HCT VFR BLD CALC: 36.1 % (ref 36–45)
LIPASE SERPL-CCNC: 43 U/L (ref 13–75)
LYMPHOCYTES # SPEC AUTO: 0.7 K/UL (ref 0.7–4.9)
MAGNESIUM SERPL-MCNC: 2.2 MG/DL (ref 1.6–2.4)
MCV RBC: 95 FL (ref 80–100)
PMV BLD: 7.9 FL (ref 7.6–11.3)
POTASSIUM SERPL-SCNC: 3.3 MEQ/L (ref 3.5–5.1)
RBC # BLD: 3.8 M/UL (ref 3.86–4.86)
TROPONIN I SERPL HS-MCNC: 13.1 PG/ML (ref ?–58.9)

## 2023-06-26 PROCEDURE — 83880 ASSAY OF NATRIURETIC PEPTIDE: CPT

## 2023-06-26 PROCEDURE — 84484 ASSAY OF TROPONIN QUANT: CPT

## 2023-06-26 PROCEDURE — 82140 ASSAY OF AMMONIA: CPT

## 2023-06-26 PROCEDURE — 87088 URINE BACTERIA CULTURE: CPT

## 2023-06-26 PROCEDURE — 93005 ELECTROCARDIOGRAM TRACING: CPT

## 2023-06-26 PROCEDURE — 87086 URINE CULTURE/COLONY COUNT: CPT

## 2023-06-26 PROCEDURE — 80048 BASIC METABOLIC PNL TOTAL CA: CPT

## 2023-06-26 PROCEDURE — 81001 URINALYSIS AUTO W/SCOPE: CPT

## 2023-06-26 PROCEDURE — 71250 CT THORAX DX C-: CPT

## 2023-06-26 PROCEDURE — 36415 COLL VENOUS BLD VENIPUNCTURE: CPT

## 2023-06-26 PROCEDURE — 85610 PROTHROMBIN TIME: CPT

## 2023-06-26 PROCEDURE — 83735 ASSAY OF MAGNESIUM: CPT

## 2023-06-26 PROCEDURE — 70450 CT HEAD/BRAIN W/O DYE: CPT

## 2023-06-26 PROCEDURE — 72125 CT NECK SPINE W/O DYE: CPT

## 2023-06-26 PROCEDURE — 71045 X-RAY EXAM CHEST 1 VIEW: CPT

## 2023-06-26 PROCEDURE — 85025 COMPLETE CBC W/AUTO DIFF WBC: CPT

## 2023-06-26 PROCEDURE — 80076 HEPATIC FUNCTION PANEL: CPT

## 2023-06-26 PROCEDURE — 83690 ASSAY OF LIPASE: CPT

## 2023-06-26 NOTE — XMS REPORT
Continuity of Care Document

                            Created on:2023



Patient:MARIA G ARTEAGA

Sex:Female

:1950

External Reference #:[id number]





Demographics







                          Address                   [address] [place] [address]

 

                          Home Phone                [phone]

 

                          Work Phone                [phone]

 

                          Mobile Phone              [phone]

 

                          Email Address             [email or web address]

 

                          Preferred Language        English

 

                          Marital Status            Unknown

 

                          Latter-day Affiliation     Unknown

 

                          Race                      Unknown

 

                          Additional Race(s)        Unavailable



                                                    White

 

                          Ethnic Group              Unknown









Author







                          Organization              [facility]

t

 

                          Address                   [address]



                                                    [place] [address]

 

                          Phone                     [phone]









Support







                Name            Relationship    Address         Phone

 

                GUS ARTEAGA  SP              [address]      [phone]



                                                [place] [address] 

 

                GUS ARTEAGA  Unavailable     [address]      [phone]



                                                [place] [address] 

 

                Gus Amin  Spouse          [address]   [phone]



                                                [place] [address] 

 

                Gus Amin  Spouse          [address]   [phone]



                                                [place] [address] 









Care Team Providers







                    Name                Role                Phone

 

                    Sammy Pritchett MD Primary Care Physician [phone]

 

                    Helen ALMARAZ, Fady MERIDA  Attending Clinician [phone]

 

                    Karly Fonseca RN Attending Clinician Unavailable

 

                    Easton Heath MD Attending Clinician [phone]

 

                    Liliya Bullock MD Attending Clinician [phone]

 

                    LISSETT_Manolo_Sowmya_MD  Attending Clinician Unavailable

 

                    Rachel Vences MA   Attending Clinician Unavailable

 

                    Marin Pina MD Attending Clinician [phone]

[phone]

 

                    Jannet Luna NP    Attending Clinician [phone]

 

                    Gianni Camara CPhT Attending Clinician Unavailable

 

                    Adele Herbert MA Attending Clinician Unavailable

 

                    Michaelle ALMARAZ, Adam SOLANO Attending Clinician [phone]

 

                    Pravin Herbert        Attending Clinician Unavailable

 

                    Oseas Manning   Attending Clinician [phone]

 

                    Pravin Herbert        Attending Clinician [phone]

 

                    Sowmya French      Attending Clinician [phone]

 

                    Alize Harris MA Attending Clinician Unavailable

 

                    Harvey Mathias MA        Attending Clinician Unavailable

 

                    Felicita Stiles MA Attending Clinician Unavailable

 

                    Tierra Millan MA Attending Clinician Unavailable

 

                    MD LILIYA BULLOCK Attending Clinician Unavailable

 

                    KAILEE PIERCE        Attending Clinician Unavailable

 

                    DARIELA ONTIVEROS           Attending Clinician Unavailable

 

                    Toan Renae   Attending Clinician Unavailable

 

                    LISSETT_Manolo_Sowmya_MD  Admitting Clinician Unavailable

 

                    LILIYA BULLOCK   Admitting Clinician Unavailable

 

                    Pravin Herbert        Admitting Clinician Unavailable

 

                    MD LILIYA BULLOCK Admitting Clinician Unavailable

 

                    DARIELA ONTIVEROS           Admitting Clinician Unavailable

 

                    Physician, No Primary or Family Admitting Clinician UnavailKAILEE Gonzales        Admitting Clinician Unavailable









Payers







           Payer Name Policy Type Policy Number Effective Date Expiration Date S

david

 

           [employer] (MEDICARE            [id number] 2023            



           REPLACEMENT PPO)                       00:00:00              

 

           MEDICARE [place]:            [id number] 2014            



           [employer]                       00:00:00              

 

           [employer] (INDEMNITY)            [id number]            



                                            00:00:00              







Problems







       Condition Condition Condition Status Onset  Resolution Last   Treating Co

mments 

Source



       Name   Details Category        Date   Date   Treatment Clinician        



                                                 Date                 

 

       Pain of Pain of Problem Active                              Reena



       right  Right                1-19                               [facility]



       shoulder Shoulder               00:00:                             dic



       joint  Joint                00                                 Sports



                                                                      [facility]



                                                                      e

 

       Pain of Pain of Problem Active                              Reena



       left   Left                 1-19                               [facility]



       shoulder Shoulder               00:00:                             dic



       joint  Joint                00                                 Sports



                                                                      [facility]



                                                                      e

 

       Adhesive Adhesive Problem Active                              [facility]

a



       capsulitis Capsulitis               1-19                               Or

thope



       of left of Left               00:00:                             dic



       shoulder Shoulder               00                                 Sports



                                                                      [facility]icin



                                                                      e

 

       Abdominal Abdominal Disease Active 2022                             Met

hodi



       bloating bloating                                              st



                                   00:00:                             Hospita



                                   00                                 l

 

       Ulnar  Ulnar  Problem Active                              Reena



       neuropathy Neuropathy               8-25                               Or

thope



       of left of Left               00:00:                             dic



       arm    Arm                  00                                 Sports



                                                                      Medicin



                                                                      e

 

       Carpal Carpal Problem Active                              Reena



       tunnel Tunnel               8-25                               [facility]



       syndrome Syndrome               00:00:                             dic



       of left of Left               00                                 Sports



       wrist  Wrist                                                   Medicin



                                                                      e

 

       Ulnar  Ulnar  Problem Active                              Reena



       nerve  Nerve                8-25                               [facility]



       entrapment Entrapment               00:00:                             di

c



       at elbow at Elbow               00                                 Sports



                                                                      Medicin



                                                                      e

 

       Hypercalce Hypercalce Problem Active 2021                             A

amy



       micheal    micheal                  2-17                               [facility]



                                   00:00:                             dic



                                   00                                 Sports



                                                                      Medicin



                                                                      e

 

       Vitamin D Vitamin D Problem Active                              Aza

alesia



       deficiency Deficiency               6-18                               Or

thope



                                   00:00:                             dic



                                   00                                 Sports



                                                                      Medicin



                                                                      e

 

       Tendon Tendon Problem Active                              Reena



       triggering Triggering               2-18                               Or

thope



                                   00:00:                             dic



                                   00                                 Sports



                                                                      Medicin



                                                                      e

 

       Trigger Trigger Problem Active                              Reena



       finger of Finger of               2-18                               Orth

ope



       left hand Left Hand               00:00:                             dic



                                   00                                 Sports



                                                                      Medicin



                                                                      e

 

       Iron   Iron   Disease Active 2019                      Overview: Method

i



       deficiency deficiency               1-15                        [facility]

 st



       anemia anemia               00:00:                      g of this Hospita



                                   00                          note   l



                                                               might be 



                                                               different 



                                                               from the 



                                                               original. 



                                                               Added  



                                                               automatic 



                                                               ally from 



                                                               request 



                                                               for    



                                                               surgery 



                                                               [id number] 

 

       Hypertroph Hypertroph Problem Active                              A

panteralea



       ic scar ic Scar               9-09                               [facility]



                                   00:00:                             dic



                                   00                                 Sports



                                                                      Medicin



                                                                      e

 

       Prosthetic Prosthetic Problem Active                              A

zalea



       joint  Joint                9-09                               [facility]



       infection Infection               00:00:                             dic



                                   00                                 Sports



                                                                      Medicin



                                                                      e

 

       Replacemen Replacemen Problem Active                              A

zalea



       t of total t of Total               8-20                               Or

thope



       knee joint Knee Joint               00:00:                             di

c



                                   00                                 Sports



                                                                      Medicin



                                                                      e

 

       Knee pain Knee Pain Problem Active                              Aza

alesia



                                   8-20                               [facility]



                                   00:00:                             dic



                                   00                                 Sports



                                                                      Medicin



                                                                      e

 

       Contusion Contusion Problem Active                              Aza

alesia



       of right of Right               7-16                               [facility]

e



       hand   Hand                 00:00:                             dic



                                   00                                 Sports



                                                                      Medicin



                                                                      e

 

       Inflammato Inflammato Problem Active                              A

zalea



       ry     ry                   6-28                               [facility]



       polyarthro Polyarthro               00:00:                             di

c



       sher  sher                00                                 Sports



                                                                      Medicin



                                                                      e

 

       Mixed  Mixed  Problem Active                              Reena



       hyperlipid Hyperlipid               5-09                               Or

thope



       emia   emia                 00:00:                             dic



                                   00                                 Sports



                                                                      Medicin



                                                                      e

 

       Gouty  Gouty  Problem Active                              Reena



       arthropath Arthropath               2-12                               Or

thope



       y      y                    00:00:                             dic



                                   00                                 Sports



                                                                      Medicin



                                                                      e

 

       Peroneal Peroneal Problem Active                              [facility]

a



       tendinitis Tendinitis               2-12                               Or

thope



                                   00:00:                             dic



                                   00                                 Sports



                                                                      Medicin



                                                                      e

 

       History of History of Disease Active                              M

ethodi



       cardiac cardiac               1-29                               st



       pacemaker pacemaker               00:00:                             Hosp

catalina



       in situ in situ               00                                 l

 

       Sick sinus Sick sinus Disease Active 2018                             M

ethodi



       syndrome syndrome               0-31                               st



                                   00:00:                             [facility]osp[facility]



                                   00                                 l

 

       SOB    SOB    Disease Active 2018                             [facility]



       (shortness (shortness               0-09                               st



       of breath) of breath)               00:00:                             Ho

spita



                                   00                                 l

 

       Essential Essential Disease Active 2018                             Met

hodi



       hypertensi hypertensi               0-09                               st



       on     on                   00:00:                             [facility]osp[facility]



                                   00                                 l

 

       Closed Closed Problem Active                              Reena



       fracture Fracture               9-25                               [facility]

e



       of fifth of Fifth               00:00:                             dic



       metatarsal Metatarsal               00                                 Sp

orts



       bone of Bone of                                                  [facility]



       right foot Right Foot                                                  e

 

       Atrial Atrial Disease Active 2017                             [facility]



       fibrillati fibrillati               2-05                               st



       on     on                   00:00:                             [facility]ospita



                                   00                                 l

 

       Renal  Renal  Problem Active 2017                             Reena



       failure Failure               0-12                               [facility]



       syndrome Syndrome               00:00:                             dic



                                   00                                 Sports



                                                                      Medicin



                                                                      e

 

       Hypothyroi Hypothyroi Problem Active                              A

amy



       dism   dism                                                [facility]



                                   00:00:                             dic



                                   00                                 Sports



                                                                      Medicin



                                                                      e

 

       Type 2 Type 2 Problem Active                              Reena



       diabetes Diabetes                                              [facility]

e



       mellitus Mellitus               00:00:                             dic



                                   00                                 Sports



                                                                      Medicin



                                                                      e

 

       Gout   Gout   Problem Active                              Reena



                                                                  [facility]



                                   00:00:                             dic



                                   00                                 Sports



                                                                      Medicin



                                                                      e

 

       Mixed  Mixed  Problem Active                              Reena



       anxiety Anxiety                                              [facility]



       and    and                  00:00:                             dic



       depressive Depressive               00                                 Sp

orts



       disorder Disorder                                                  Medici

n



                                                                      e

 

       Hypertensi Hypertensi Problem Active                              A

amy



       ve     ve                                                  [facility]



       disorder Disorder               00:00:                             dic



                                   00                                 Sports



                                                                      Medicin



                                                                      e

 

       Atrial Atrial Problem Active                              Reena



       fibrillati Fibrillati                                              Or

thope



       on     on                   00:00:                             dic



                                   00                                 Sports



                                                                      Medicin



                                                                      e

 

       Allergic Allergic Problem Active                              [facility]

a



       rhinitis Rhinitis                                              [facility]

e



                                   00:00:                             dic



                                   00                                 Sports



                                                                      Medicin



                                                                      e

 

       Kidney Kidney Problem Active                              Reena



       stone  Stone                                               [facility]



                                   00:00:                             dic



                                   00                                 Sports



                                                                      Medicin



                                                                      e

 

       Osteoporos Osteoporos Problem Active                              A

amy



       is     is                                                  [facility]



                                   00:00:                             dic



                                   00                                 Sports



                                                                      Medicin



                                                                      e

 

       Pathologic Pathologic Problem Active                              WES reyes



       al     al                                                  [facility]



       fracture - Fracture -               00:00:                             di

c



       lower leg Lower Leg               00                                 Spor

ts



                                                                      Medicin



                                                                      e

 

       Accessory Accessory Problem Active                              Clay dumas



       navicular Navicular                                              Orth

ope



       bone of Bone of               00:00:                             dic



       foot   Foot                 00                                 Sports



                                                                      Medicin



                                                                      e

 

       Fracture Fracture Problem Active                              Tomeka bird



       of cuboid of Cuboid                                              Orth

ope



                                   00:00:                             dic



                                   00                                 Sports



                                                                      Medicin



                                                                      e

 

       Postmenopa Postmenopa Problem Active                              A

amy



       usal state usal State                                              [facility]

[facility]ope



                                   00:00:                             dic



                                   00                                 Sports



                                                                      [facility]



                                                                      e

 

       Open   Open   Problem Active                              [facility]



       reduction Reduction                                              [facility]

ope



       of     of                   00:00:                             dic



       fracture Fracture               00                                 Sports



       with   with                                                    [facility]



       internal Internal                                                  e



       fixation Fixation                                                  

 

       Carpal Carpal Problem Active                              [facility]



       tunnel Tunnel                                              [facility]



       syndrome Syndrome               00:00:                             dic



                                   00                                 Sports



                                                                      [facility]



                                                                      e

 

       Closed Closed Problem Active                              [facility]ena



       extraartic Extraartic                                              Or

ope



       ular   ular                 00:00:                             dic



       fracture Fracture               00                                 Sports



       of distal of Distal                                                  Medi

denise



       radius Radius                                                  e

 

       Metatarsal Metatarsal Problem Active                              A

amy



       bone   Bone                                                [facility]



       fracture Fracture               00:00:                             dic



                                   00                                 Sports



                                                                      [facility]



                                                                      e

 

       Tibialis Tibialis Problem Active                              Tomeka bird



       posterior Posterior               7                               [facility]

ope



       tendinitis Tendinitis               00:00:                             di

c



                                   00                                 Sports



                                                                      [facility]



                                                                      e

 

       Sprain of Sprain of Problem Active                              [place]

[place]



       foot   Foot                 7                               [facility]



                                   00:00:                             dic



                                   00                                 Sports



                                                                      [facility]



                                                                      e

 

       Sprain of Sprain of Problem Active                              [place]

[place]



       ligament Ligament               7                               [facility]

e



       of     of                   00:00:                             dic



       tarsometat Tarsometat               00                                 Sp

orts



       arsal  arsal                                                   [facility]



       joint  Joint                                                   e

 

       Atrial Atrial Disease Recurre                              [facility]



       fibrillati fibrillati        nce    ,    on,                  00:00:                             [facility]



       currently currently               00                                 l



       in sinus in sinus                                                  



       rhythm rhythm                                                  

 

       Acquired Acquired Problem Active                              Tomeka

a



       trigger Trigger               3-29                               [facility]



       finger Finger               00:00:                             dic



                                   00                                 Sports



                                                                      [facility]



                                                                      e

 

       Total knee Total Knee Problem Active                              A

zajaya



       replacemen Replacemen               3-29                               [facility]

[facility]ope



       t      t                    00:00:                             dic



                                   00                                 Sports



                                                                      [facility]



                                                                      e

 

       Asthma Asthma Problem Active                              Reena



                                   3                               [facility]



                                   00:00:                             dic



                                   00                                 Sports



                                                                      [facility]



                                                                      e







Allergies, Adverse Reactions, Alerts







       Allergy Allergy Status Severity Reaction(s) Onset  Inactive Treating Comm

ents 

Source



       Name   Type                        Date   Date   Clinician        

 

       Gabapent Propensi Active        Altered                confusion Me

thodi



       in     ty to                Mental                         st



              adverse               Status 00:00:                      [facility]



              reaction                      00                          l



              s to                                                    



              drug                                                    

 

       Rivaroxa Propensi Active        GI Bleeding                       M

ethodi



       ban    ty to                                               st



              adverse                      00:00:                      [facility]



              reaction                      00                          l



              s to                                                    



              drug                                                    

 

       codeine DA     Active MI            -0                      [facility]



                                          8                        [place]



                                          00:00:                      [place]



                                          00                          [facility]

 

       pentazoc DA     Active MI                                  [facility]



       ine                                8                        [facility]



                                          00:00:                      [place]



                                                                    [facility]

 

       meperidi DA     Active SV                                  [facility]



       ne                                 8                        [facility]



                                          00:00:                      [place]



                                          00                          [facility]

 

       rivaroxa DA     Active MO                                  [facility]



       ban                                                        Clear



                                          00:00:                      [place]



                                          00                          [facility]

 

       codeine DA     Active MI     VOMITING -                      [facility]



                                          8                        [place]



                                          00:00:                      [facility]



                                          00                          dic



                                                                      [facility]



                                                                      l

 

       pentazoc DA     Active MI     HALLUCINATIO                       HC

A



       ine                         NS                             [place]



                                          00:00:                      [facility]



                                          00                          dic



                                                                      [facility]



                                                                      l

 

       meperidi DA     Active SV     ANAPHYLAXIS                       [facility]



       ne                                                         [place]



                                          00:00:                      [facility]



                                          00                          dic



                                                                      [facility]



                                                                      l

 

       rivaroxa DA     Active MO     MASSIVE                       [facility]



       ban                         BLEEDING                         [place]



                                          00:00:                      [facility]



                                          00                          dic



                                                                      [facility]



                                                                      l

 

       codeine DA     Active MI            0                      [facility]



                                          7                        [place]



                                          00:00:                      [place]



                                          00                          [facility]

 

       pentazoc DA     Active MI            0                      [facility]



       ine                                7                        [place]



                                          00:00:                      [place]



                                          00                          [facility]

 

       meperidi DA     Active SV            0                      [facility]



       ne                                 7                        [place]



                                          00:00:                      [place]



                                          00                          [facility]

 

       rivaroxa DA     Active MO                                  [facility]



       ban                                7                        [facility]



                                          00:00:                      [place]



                                          00                          [facility]

 

       Codeine Propensi Active        GI                    Vomiting Metho

di



              ty to                Intolerance 6-15                        st



              adverse                      00:00:                      [facility]



              reaction                      00                          l



              s to                                                    



              drug                                                    

 

       Pentazoc Propensi Active        Other (See                Hallucina

 [facility]



       ine    ty to                Comments) 6-15                 tions  st



              adverse                      00:00:                      [facility]



              reaction                      00                          l



              s to                                                    



              drug                                                    

 

       Codeine- Propensi Active        GI                           Method

i



       Cpm-Pe-A ty to                Intolerance                         st



       cetamino adverse                      00:00:                      [facility]



       phen   reaction                      00                          l



              s to                                                    



              drug                                                    

 

       Meperidi Propensi Active        Other (See                       Me

thodi



       ne     ty to                Comments)                         st



              adverse                      00:00:                      [facility]



              reaction                      00                          l



              s to                                                    



              drug                                                    

 

       Pentazoc Propensi Active        Other (See                [facility]uc[facility]

 [facility]



       ine    ty to                Comments)                  tions  st



       Lactate adverse                      00:00:                      [facility]



              reaction                      00                          l



              s to                                                    



              drug                                                    

 

       Talwin Allergy Active        Hallucinatio                       [facility]

[place]



              to                   ns     3-29                        [facility]



              substanc                      00:00:                      dic



              e                           00                          Sports



                                                                      [facility]



                                                                      e

 

       Codeine Allergy Active        Vomiting                       [facility]



              to                          329                        [facility]



              substanc                      00:00:                      dic



              e                           00                          Sports



                                                                      [facility]



                                                                      e

 

       Demerol Allergy Active        Anaphylaxis                       [facility]

[place]



              to                          3                        [facility]



              substanc                      00:00:                      dic



              e                           00                          Sports



                                                                      [facility]



                                                                      e







Family History







           Family Member Diagnosis  Comments   Start Date Stop Date  Source

 

           Natural mother Cancer                                      [facility]

 

           Natural father No Known Problems                                  Met

[facility]

 

           Natural sister No Known Problems                                  Met

[facility]







Social History







           Social Habit Start Date Stop Date  Quantity   Comments   Source

 

           Gender identity                                             [facility]

 

           Sexual orientation                                             [facility]

ist



                                                                  [facility]ospital

 

           Alcohol intake 2023 Ex-drinker            [facility]



                      00:00:00   00:00:00   (finding)             [facility]ospital

 

           History of Social 2023                       [facility]

st



           function   00:00:00   00:00:00                         Hospital

 

           Tobacco use and 2022 Smokeless             [facility]



           exposure   00:00:00   00:00:00   tobacco non-user            Hospital

 

           Alcohol Comment 2019 minimal               [facility]



                      00:00:00   00:00:00                         Hospital

 

           Sex Assigned At 1950                       [facility]



           Birth      00:00:00   00:00:00                         Hospital









                Smoking Status  Start Date      Stop Date       Source

 

                Never smoked tobacco                                 [facility]

[facility]







Medications







       Ordered Filled Start  Stop   Current Ordering Indication Dosage Frequency

 Signature

                    Comments            Components          Source



     Medication Medication Date Date Medication? Clinician                (SIG) 

          



     Name Name                                                   

 

     furosemide            Yes                      TAKE 1           [facility]

di



     (LASIX) 20      6-12                               TABLET BY           st



     mg tablet      00:00:                               MOUTH           [facility]osp[facility]



               00                                 TWICE A           l



                                                  DAY            

 

     hydroCHLORO      0 - Yes            25mg QD   Take 1           Met

hodi



     thiazide      5-25 05-25                          tablet (25           st



     (HYDRODIURI      00:00: 04:59                          mg total)           

[facility]osp[facility]



     L) 25 MG      00   :00                           by mouth           l



     tablet                                         daily.           

 

     cholecalcif      2023- No                       cholecalci          

 [facility]



     ronnell,      5-16 05-16                          ferol           st



     vitamin D3,      11:40: 00:00                          (vitamin           H

ospita



     1,250 mcg      48   :00                           D3) 1,250           l



     (50,000                                         mcg            



     unit)                                         (50,000           



     capsule                                         unit)           



                                                  capsule           



                                                  TAKE 1           



                                                  CAPSULE BY           



                                                  MOUTH ONCE           



                                                  A MONTH           

 

     famotidine            Yes                      famotidine           M

ethodi



     (PEPCID) 20      5-16                               20 mg           st



     MG tablet      10:46:                               tablet           Hospit

a



               48                                 TAKE 1           l



                                                  TABLET BY           



                                                  MOUTH           



                                                  TWICE A           



                                                  DAY            

 

     levalbutero            Yes                      levalbuter           

[facility]



     l (XOPENEX      5-16                               ol HFA 45           st



     HFA) 45      10:46:                               mcg/actuat           Hosp

catalina



     mcg/actuati      48                                 ion            l



     on inhaler                                         aerosol           



                                                  inhaler           



                                                  INHALE 2           



                                                  PUFFS BY           



                                                  MOUTH           



                                                  EVERY 4           



                                                  HOURS AS           



                                                  NEEDED           

 

     metoprolol            Yes            150mg QD   Take 1.5           Me

thodi



     succinate      5-16                               tablets           st



     XL        10:46:                               (150 mg           [facility]osp[facility]



     (TOPROL-XL)      48                                 total) by           l



     100 MG 24                                         mouth           



     hr tablet                                         daily.           

 

     apixaban            Yes            5mg  Q.5D Take 1           [facility]



     (ELIQUIS) 5      5-16                               tablet (5           st



     mg tablet      10:46:                               mg total)           Hos

charisse



               48                                 by mouth 2           l



                                                  (two)           



                                                  times a           



                                                  day.           

 

     levothyroxi            Yes            125ug QD   Take 1           Met

hodi



     ne        5-16                               tablet           st



     (SYNTHROID,      10:46:                               (125 mcg           Ho

spita



     LEVOTHROID)      48                                 total) by           l



     125 MCG                                         mouth           



     tablet                                         daily.           

 

     sertraline      0      Yes            50mg QD   Take 1           [facility]

di



     (ZOLOFT) 50      5-16                               tablet (50           st



     MG tablet      10:46:                               mg total)           Hos

charisse



               48                                 by mouth           l



                                                  daily.           

 

     cholestyram      0 - No             1{packe Q.5D Take 1           

[facility]



     ine       4-11 -16                t}        packet by           st



     (Questran)      00:00: 00:00                          mouth 2           Hos

charisse



     4 gram      00   :00                           (two)           l



     packet                                         times a           



                                                  day with           



                                                  meals.           

 

     dexlansopra            Yes                      TAKE 1           Meth

temo



     zole      -26                               CAPSULE BY           st



     (DEXILANT)      00:00:                               MOUTH           Hospit

a



     60 mg      00                                 EVERY DAY           l



     capsule                                                        

 

     riFAXimin      2022- No             550mg [id number] Take 1         

  [facility]



     (Xifaxan)      03 -18                     [id number] tablet           s

t



     550 mg      00:00: 05:59                     3D   (550 mg           Hospita



     tablet      00   :00                           total) by           l



                                                  mouth 3           



                                                  (three)           



                                                  times a           



                                                  day for 14           



                                                  days.           

 

     dexlansopra            Yes                      TAKE 1           Meth

temo



     zole      -22                               CAPSULE BY           st



     (DEXILANT)      00:00:                               MOUTH           Hospit

a



     60 mg      00                                 DAILY FOR           l



     capsule                                         30 DAYS.           

 

     fenofibrate      2023- No                                      [facility]

di



     micronized      -16                                         st



     (LOFIBRA)      00:00: 00:00                                         [facility]



     134 MG      00   :00                                          l



     capsule                                                        

 

     dexlansopra      2023- No                       TAKE 1           Met

hodi



     zole      --26                          CAPSULE BY           st



     (DEXILANT)      00:00: 00:00                          MOUTH           [facility]

ta



     60 mg      00   :00                           DAILY FOR           l



     capsule                                         30 DAYS.           

 

     furosemide      2023- No             20mg Q.5D Take 1           [facility]eth

temo



     (LASIX) 20      -08                          tablet (20           st



     mg tablet      00:00: 04:59                          mg total)           Ho

spita



               00   :00                           by mouth 2           l



                                                  (two)           



                                                  times a           



                                                  day.           

 

     furosemide      2023- No             20mg Q.5D Take 1           Meth

temo



     (LASIX) 20       06-12                          tablet (20           st



     mg tablet      00:00: 00:00                          mg total)           Ho

spita



               00   :00                           by mouth 2           l



                                                  (two)           



                                                  times a           



                                                  day.           

 

     furosemide            Yes                      On M, W, F           M

ethodi



     (LASIX) 20      7-06                                              st



     mg tablet      00:00:                                              [facility]osp[facility]



               00                                                l

 

     furosemide      2022- No                       On M, W, F           

Method[facility]



     (LASIX) 20      7-06 11-09                                         st



     mg tablet      00:00: 00:00                                         [facility]



               00   :00                                          l

 

     furosemide      -0 - No                       On M, W, F           

Method[facility]



     (LASIX)                                          st



     mg tablet      00:00: 00:00                                         [facility]



               00   :00                                          l

 

     furosemide      -0 - No                       On M, W, F           

Method[facility]



     (LASIX) 20                                               st



     mg tablet      00:00: 00:00                                         [facility]



               00   :00                                          l

 

     metoprolol            Yes            150mg QD   Take 150           Me

thodi



     succinate      6-15                               mg by           st



     XL        14:33:                               mouth           [facility]



     (TOPROL-XL)      01                                 daily.           l



     100 MG 24                                                        



     hr tablet                                                        

 

     apixaban      0      Yes            5mg  Q.5D Take 5 mg           Meth

temo



     (ELIQUIS) 5      6-15                               by mouth 2           st



     mg tablet      14:33:                               (two)           [facility]osp[facility]



               01                                 times a           l



                                                  day.           

 

     levothyroxi            Yes            125ug QD   Take 125           M

ethodi



     ne        6-15                               mcg by           st



     (SYNTHROID,      14:33:                               mouth           [facility]os[facility]

ta



     LEVOTHROID)      01                                 daily.           l



     125 MCG                                                        



     tablet                                                        

 

     sertraline            Yes            50mg QD   Take 50 mg           M

ethodi



     (ZOLOFT) 50      6-15                               by mouth           st



     MG tablet      14:33:                               daily.           Hospit

a



                                                               l

 

     dexlansopra      -2022- No             60mg QD   Take 1           Met

hodi



     zole      6-15 -                          capsule           st



     (Dexilant)      00:00: 00:00                          (60 mg           Hosp

[facility]



     60 mg      00   :00                           total) by           l



     capsule                                         mouth           



                                                  daily for           



                                                  30 days.           

 

     dexlansopra      -0 - No             60mg QD   Take 1           Met

hodi



     zole      6-15 -                          capsule           st



     (Dexilant)      00:00: 00:00                          (60 mg           H[facility]

[facility]



     60 mg      00   :00                           total) by           l



     capsule                                         mouth           



                                                  daily for           



                                                  30 days.           

 

     omeprazole      0      Yes                      TAKE 1           [facility]

di



     (PriLOSEC)      5-12                               CAPSULE BY           st



     40 MG      00:00:                               MOUTH           [facility]



     capsule      00                                 DAILY FOR           l



                                                  30 DAYS.           

 

     omeprazole      -0 - No                       TAKE 1           Meth

temo



     (PriLOSEC)      5-12 -                          CAPSULE BY           st



     40 MG      00:00: 00:00                          MOUTH           [facility]



     capsule      00   :00                           DAILY FOR           l



                                                  30 DAYS.           

 

     famotidine       No             20mg Q.5D Take 20 mg           

Method[facility]



     (PEPCID) 20      3-09 06-15                          by mouth 2           s

t



     MG tablet      00:00: 00:00                          (two)           Hospit

a



               00   :00                           times a           l



                                                  day.           

 

     nitrofurant       No             100mg Q.5D Take 1           Me

thodi



     oin,      1-11 01-15                          capsule           st



     macrocrysta      00:00: 05:59                          (100 mg           Ho

spita



     l-monohydra      00   :00                           total) by           l



     te,                                          mouth 2           



     (Macrobid)                                         (two)           



     100 MG                                         times a           



     capsule                                         day for 3           



                                                  days.           

 

     furosemide       No                       On M, W, F           

Method[facility]



     (LASIX) 20      -                                         st



     mg tablet      00:00: 00:00                                         [facility]



               00   :00                                          l

 

     furosemide      2022- No                       On M, W, F           

[facility]



     (LASIX) 20                                               st



     mg tablet      00:00: 00:00                                         [facility]



               00   :00                                          l

 

     irbesartan      2021 No             150mg QD   Take 1           Met

hodi



     (AVAPRO)                                tablet           st



     150 MG      00:00: 05:59                          (150 mg           [facility]



     tablet      00   :00                           total) by           l



                                                  mouth           



                                                  daily for           



                                                  30 days.           

 

     irbesartan      2021 No             300mg QD   Take 2           Met

hodi



     (AVAPRO)                                tablets           st



     150 MG      00:00: 00:00                          (300 mg           [facility]



     tablet      00   :00                           total) by           l



                                                  mouth           



                                                  daily for           



                                                  30 days.           

 

     irbesartan      2021 No             150mg QD   Take 150           M

ethodi



     (AVAPRO)                                mg by           st



     150 MG      18:23: 00:00                          mouth           [facility]



     tablet      11   :00                           daily.           l

 

     irbesartan      2021 No                       TAKE 1           Meth

temo



     (AVAPRO)                                TABLET BY           st



     300 MG      00:00: 00:00                          MOUTH           [facility]



     tablet      00   :00                           EVERY DAY           l

 

     omeprazole      2021 No             40mg QD   Take 1           Meth

temo



     (PriLOSEC)      -12                          capsule           st



     40 MG      00:00: 00:00                          (40 mg           [facility]



     capsule      00   :00                           total) by           l



                                                  mouth           



                                                  daily for           



                                                  30 days.           

 

     cholestyram      2022- No                       TAKE 1           Met

hodi



     ine       8-10 05-09                          PACKET BY           st



     (QUESTRAN)      00:00: 00:00                          MOUTH 2           Hos

[facility]



     4 gram      00   :00                           (TWO)           l



     packet                                         TIMES A           



                                                  DAY.           

 

     furosemide      2022- No                       TAKE 2           Meth

temo



     (LASIX) 20      3-11 01-05                          TABLETS BY           st



     mg tablet      00:00: 00:00                          MOUTH           Hospit

a



               00   :00                           EVERY DAY           l

 

     nateglinide            Yes            60mg [id number] Take 60 mg     

      [facility]



     (STARLIX)      6-10                          [id number] by mouth 3         

  st



     60 MG      00:00:                          3D   (three)           [facility]osp[facility]



     tablet      00                                 times a           l



                                                  day before           



                                                  meals.           

 

     nateglinide      2022- No             60mg [id number] Take 60 mg    

       [facility]



     (STARLIX)      6-10 11-                     [id number] by mouth 3        

   st



     60 MG      00:00: 00:00                     3D   (three)           [facility]



     tablet      00   :00                           times a           l



                                                  day before           



                                                  meals.           

 

     TRADJENTA 5            Yes            5mg  QD   Take 1           Meth

temo



     mg tablet      8-22                               tablet (5           st



               00:00:                               mg total)           [facility]osp[facility]



               00                                 by mouth           l



                                                  daily.           

 

     TRADJENTA 5            Yes            5mg  QD   Take 5 mg           M

ethodi



     mg tablet      8-22                               by mouth           st



               00:00:                               daily.           [facility]



               00                                                l

 

     fenofibrate            Yes                      TAKE ONE           Me

thodi



     (TRICOR)      5-09                               TAB ONCE A           st



     145 MG      00:00:                               DAY            [facility]



     tablet      00                                                l

 

     fenofibrate            Yes                      TAKE ONE           Me

thodi



     (TRICOR)      5-09                               TAB ONCE A           st



     145 MG      00:00:                               DAY            [facility]



     tablet      00                                                l

 

     allopurinol            Yes                      TAKE 1           Meth

temo



     (ZYLOPRIM)      4-25                               TABLET BY           st



     100 MG      00:00:                               MOUTH           [facility]



     tablet      00                                 TWICE A           l



                                                  DAY. STOP           



                                                  ULORIC           

 

     allopurinol            Yes                      TAKE 1           [facility]

temo



     (ZYLOPRIM)      4-25                               TABLET BY           st



     100 MG      00:00:                               MOUTH           [facility]



     tablet      00                                 TWICE A           l



                                                  DAY. STOP           



                                                  ULORIC           

 

     diltiazem            Yes            360mg QD   Take 1           [facility]

di



     CD        3-04                               capsule           st



     (CardIZEM      00:00:                               (360 mg           [facility]

ta



     CD) 360 MG      00                                 total) by           l



     24 hr                                         mouth           



     capsule                                         daily.           

 

     diltiazem      2019-0      Yes            360mg QD   Take 360           Met

hodi



     CD        3-04                               mg by           st



     (CardIZEM      00:00:                               mouth           [facility]



     CD) 360 MG      00                                 daily.           l



     24 hr                                                        



     capsule                                                        

 

     flecainide      2019-0      Yes            100mg Q.5D Take 1           Meth

temo



     (TAMBOCOR)      2-09                               tablet           st



     100 MG      00:00:                               (100 mg           [facility]



     tablet      00                                 total) by           l



                                                  mouth 2           



                                                  (two)           



                                                  times a           



                                                  day.           

 

     flecainide      2019-0      Yes            100mg Q.5D Take 100           Me

thodi



     (TAMBOCOR)      2-09                               mg by           st



     100 MG      00:00:                               mouth 2           [facility]



     tablet      00                                 (two)           l



                                                  times a           



                                                  day.           

 

     Prolia 60 Prolia 60 2018      No                       Prolia 60         

  [facility]



     mg/mL mg/mL 0-12                               mg/mL           [facility]



     subcutaneou subcutaneou 00:00:                               subcutaneo    

       dic



     s syringe s syringe 00                                 us syringe          

 [facility]



     5/15/18 5/15/18                                    5/15/18           [facility]

[facility]



                                                                 e

 

     Prolia 60 Prolia 60 2018      No                       Prolia 60         

  [facility]



     mg/mL mg/mL 0-12                               mg/mL           [facility]



     subcutaneou subcutaneou 00:00:                               subcutaneo    

       dic



     s syringe s syringe 00                                 us syringe          

 [facility]



     5/15/18 5/15/18                                    5/15/18           [facility]

[facility]



                                                                 e

 

     Prolia 60 Prolia 60 2018      No                       Prolia 60         

  [facility]



     mg/mL mg/mL 0-12                               mg/mL           [facility]



     subcutaneou subcutaneou 00:00:                               subcutaneo    

       dic



     s syringe s syringe 00                                 us syringe          

 [facility]



     5/15/18 5/15/18                                    5/15/18           [facility]

[facility]



                                                                 e

 

     Prolia 60 Prolia 60 2018      No                       Prolia 60         

  [facility]



     mg/mL mg/mL 0-12                               mg/mL           [facility]



     subcutaneou subcutaneou 00:00:                               subcutaneo    

       dic



     s syringe s syringe 00                                 us syringe          

 [facility]



     5/15/18 5/15/18                                    5/15/18           [facility]

[facility]



                                                                 e

 

     Prolia 60 Prolia 60 2018      No                       Prolia 60         

  [facility]



     mg/mL mg/mL 0-12                               mg/mL           [facility]



     subcutaneou subcutaneou 00:00:                               subcutaneo    

       dic



     s syringe s syringe 00                                 us syringe          

 [facility]



     5/15/18 5/15/18                                    5/15/18           [facility]

[facility]



                                                                 e

 

     Prolia 60 Prolia 60 2018      No                       Prolia 60         

  [facility]



     mg/mL mg/mL 0-12                               mg/mL           [facility]



     subcutaneou subcutaneou 00:00:                               subcutaneo    

       dic



     s syringe s syringe 00                                 us syringe          

 [facility]



     5/15/18 5/15/18                                    5/15/18           [facility]

[facility]



                                                                 e

 

     Prolia 60 Prolia 60 2018      No                       Prolia 60         

  [facility]



     mg/mL mg/mL 0-12                               mg/mL           [facility]



     subcutaneou subcutaneou 00:00:                               subcutaneo    

       dic



     s syringe s syringe 00                                 us syringe          

 [facility]



     5/15/18 5/15/18                                    5/15/18           [facility]

[facility]



                                                                 e

 

     Prolia 60 Prolia 60 2018      No                       Prolia 60         

  [facility]



     mg/mL mg/mL 0-12                               mg/mL           [facility]



     subcutaneou subcutaneou 00:00:                               subcutaneo    

       dic



     s syringe s syringe 00                                 us syringe          

 [facility]



     5/15/18 5/15/18                                    5/15/18           [facility]

[facility]



                                                                 e

 

     montelukast      2017      Yes            10mg QD   Take 1           [facility]

tem[facility]



     (SINGULAIR)      1-14                               tablet (10           st



     10 mg      00:00:                               mg total)           [facility]



     tablet      00                                 by mouth           l



                                                  daily           



                                                  before           



                                                  breakfast.           

 

     montelukast      2017      Yes            10mg QD   Take 10 mg           

[facility]



     (SINGULAIR)      1-14                               by mouth           st



     10 mg      00:00:                               daily           [facility]



     tablet      00                                 before           l



                                                  breakfast.           

 

     Calcium Calcium       No                       Calcium           [facility]

ea



     Citrate + Citrate + 9-21                               Citrate +           

[facility]



     tablet 1 tablet 1 00:00:                               tablet 1           d

ic



     tablet once tablet once 00                                 tablet          

 [facility]



     a day a day                                    once a day           [facility]



                                                                 e

 

     Calcium Calcium       No                       Calcium           [facility]

ea



     Citrate + Citrate + 9-21                               Citrate +           

[facility]



     tablet 1 tablet 1 00:00:                               tablet 1           d

ic



     tablet once tablet once 00                                 tablet          

 [facility]



     a day a day                                    once a day           [facility]



                                                                 e

 

     Calcium Calcium       No                       Calcium           [facility]

ea



     Citrate + Citrate + 9-21                               Citrate +           

[facility]



     tablet 1 tablet 1 00:00:                               tablet 1           d

ic



     tablet once tablet once 00                                 tablet          

 [facility]



     a day a day                                    once a day           [facility]



                                                                 e

 

     Calcium Calcium       No                       Calcium           [facility]

ea



     Citrate + Citrate + 9-21                               Citrate +           

[facility]



     tablet 1 tablet 1 00:00:                               tablet 1           d

ic



     tablet once tablet once 00                                 tablet          

 [facility]



     a day a day                                    once a day           [facility]



                                                                 e

 

     Calcium Calcium       No                       Calcium           [facility]

ea



     Citrate + Citrate + 9-21                               Citrate +           

[facility]



     tablet 1 tablet 1 00:00:                               tablet 1           d

ic



     tablet once tablet once 00                                 tablet          

 [facility]



     a day a day                                    once a day           [facility]



                                                                 e

 

     Calcium Calcium       No                       Calcium           [facility]

ea



     Citrate + Citrate + 9-21                               Citrate +           

[facility]



     tablet 1 tablet 1 00:00:                               tablet 1           d

ic



     tablet once tablet once 00                                 tablet          

 [facility]



     a day a day                                    once a day           [facility]



                                                                 e

 

     Calcium Calcium       No                       Calcium           [facility]

ea



     Citrate + Citrate + 9-21                               Citrate +           

[facility]



     tablet 1 tablet 1 00:00:                               tablet 1           d

ic



     tablet once tablet once 00                                 tablet          

 [facility]



     a day a day                                    once a day           [facility]



                                                                 e

 

     Calcium Calcium       No                       Calcium           [facility]

ea



     Citrate + Citrate + 9-21                               Citrate +           

[facility]



     tablet 1 tablet 1 00:00:                               tablet 1           d

ic



     tablet once tablet once 00                                 tablet          

 [facility]



     a day a day                                    once a day           [facility]



                                                                 e

 

     irbesartan irbesartan       No                       irbesartan      

     [facility]



     300 mg 300 mg 3-29                               300 mg           [facility]



     tablet 1 tablet 1 00:00:                               tablet 1           d

ic



     tablet once tablet once 00                                 tablet          

 [facility]



     a day a day                                    once a day           [facility]



                                                                 e

 

     irbesartan irbesartan       No                       irbesartan      

     [facility]



     300 mg 300 mg 3-29                               300 mg           [facility]



     tablet 1 tablet 1 00:00:                               tablet 1           d

ic



     tablet once tablet once 00                                 tablet          

 [facility]



     a day a day                                    once a day           [facility]



                                                                 e

 

     cholecalcif cholecalcif       No                       cholecalci    

       [facility]



     ronnell(vitami ronnell(vitami 3-29                               ferol(estrella      

     [facility]



     n D3) 125 n D3) 125 00:00:                               min D3)           

[facility]ic



     mcg (5,000 mcg (5,000 00                                 125 mcg           

[facility]



     unit) unit)                                    (5,000           [facility]



     disintegrat disintegrat                                    unit)           

e



     ing tablet ing tablet                                    disintegra        

   



     Take 1 Take 1                                    ting           



     tablet by tablet by                                    tablet           



     mouth twice mouth twice                                    Take 1          

 



     a day a day                                    tablet by           



                                                  mouth           



                                                  twice a           



                                                  day            

 

     irbesartan irbesartan       No                       irbesartan      

     [facility]



     300 mg 300 mg 3-29                               300 mg           [facility]



     tablet 1 tablet 1 00:00:                               tablet 1           d

ic



     tablet once tablet once 00                                 tablet          

 [facility]



     a day a day                                    once a day           [facility]



                                                                 e

 

     irbesartan irbesartan       No                       irbesartan      

     [facility]



     300 mg 300 mg 3-29                               300 mg           [facility]



     tablet 1 tablet 1 00:00:                               tablet 1           d

ic



     tablet once tablet once 00                                 tablet          

 [facility]



     a day a day                                    once a day           [facility]



                                                                 e

 

     irbesartan irbesartan       No                       irbesartan      

     [facility]



     300 mg 300 mg 3-29                               300 mg           [facility]



     tablet 1 tablet 1 00:00:                               tablet 1           d

ic



     tablet once tablet once 00                                 tablet          

 [facility]



     a day a day                                    once a day           [facility]



                                                                 e

 

     irbesartan irbesartan       No                       irbesartan      

     [facility]



     300 mg 300 mg 3-29                               300 mg           [facility]



     tablet 1 tablet 1 00:00:                               tablet 1           d

ic



     tablet once tablet once 00                                 tablet          

 [facility]



     a day a day                                    once a day           [facility]



                                                                 e

 

     irbesartan irbesartan       No                       irbesartan      

     [facility]



     300 mg 300 mg 3-29                               300 mg           [facility]



     tablet 1 tablet 1 00:00:                               tablet 1           d

ic



     tablet once tablet once 00                                 tablet          

 [facility]



     a day a day                                    once a day           [facility]



                                                                 e

 

     irbesartan irbesartan       No                       irbesartan      

     [facility]



     300 mg 300 mg 3-29                               300 mg           [facility]



     tablet 1 tablet 1 00:00:                               tablet 1           d

ic



     tablet once tablet once 00                                 tablet          

 [facility]



     a day a day                                    once a day           [facility]



                                                                 e

 

     Accu-Chek Accu-Chek           No                       Accu-Chek           

[facility]



     Nancy Plus Nancy Plus                                    Nancy Plus        

   [facility]



     test strips test strips                                    test           d

ic



     USE 1 STRIP USE 1 STRIP                                    strips USE      

     [facility]



     TO CHECK TO CHECK                                    1 STRIP TO           M

[facility]



     GLUCOSE GLUCOSE                                    CHECK           e



                                                  GLUCOSE           

 

     allopurinol allopurinol           No                       allopurino      

     [facility]



                                                  l              [facility]



                                                                 dic



                                                                 [facility]



                                                                 [facility]



                                                                 e

 

     allopurinol allopurinol           No                       allopurino      

     [facility]



     100 mg 100 mg                                    l 100 mg           [facility]



     tablet TAKE tablet TAKE                                    tablet          

 [facility]



     1 TABLET BY 1 TABLET BY                                    TAKE 1          

 [facility]



     MOUTH TWICE MOUTH TWICE                                    TABLET BY       

    [facility]



     A DAY A DAY                                    MOUTH           e



                                                  TWICE A           



                                                  DAY            

 

     dexlansopra dexlansopra           No                       dexlansopr      

     [facility]



     zole 60 mg zole 60 mg                                    azole 60          

 [facility]



     capsule,bip capsule,bip                                    mg             d

ic



     hase hase                                    capsule,bi           [facility]



     delayed delayed                                    phase           [facility]



     release release                                    delayed           e



     TAKE 1 TAKE 1                                    release           



     CAPSULE BY CAPSULE BY                                    TAKE 1           



     MOUTH EVERY MOUTH EVERY                                    CAPSULE BY      

     



     DAY  DAY                                     MOUTH           



                                                  EVERY DAY           

 

     diltiazem diltiazem           No                       diltiazem           

[facility]



      mg  mg                                     mg           

[facility]



     capsule,ext capsule,ext                                    capsule,ex      

     [facility]



     ended ended                                    tended           [facility]



     release 24 release 24                                    release 24        

   [facility]



     hr TAKE 1 hr TAKE 1                                    hr TAKE 1           

e



     CAPSULE BY CAPSULE BY                                    CAPSULE BY        

   



     MOUTH EVERY MOUTH EVERY                                    MOUTH           



     DAY  DAY                                     EVERY DAY           

 

     Eliquis 5 Eliquis 5           No                       Eliquis 5           

[facility]



     mg tablet mg tablet                                    mg tablet           

[facility]



     TAKE 1 TAKE 1                                    TAKE 1           dic



     TABLET BY TABLET BY                                    TABLET BY           

[facility]



     MOUTH TWICE MOUTH TWICE                                    MOUTH           

[facility]



     A DAY A DAY                                    TWICE A           e



                                                  DAY            

 

     fenofibrate fenofibrate           No                       fenofibrat      

     [facility]



     nanocrystal nanocrystal                                    e              O

[facility]



     lized 145 lized 145                                    nanocrysta          

 [facility]



     mg tablet mg tablet                                    llized 145          

 [facility]



     TAKE ONE TAKE ONE                                    mg tablet           Me

[facility]



     TAB ONCE A TAB ONCE A                                    TAKE ONE          

 e



     DAY  DAY                                     TAB ONCE A           



                                                  DAY            

 

     flecainide flecainide           No                       flecainide        

   [facility]



     100 mg 100 mg                                    100 mg           [facility]



     tablet TAKE tablet TAKE                                    tablet          

 [facility]



     1 TABLET BY 1 TABLET BY                                    TAKE 1          

 [facility]



     MOUTH TWICE MOUTH TWICE                                    TABLET BY       

    [facility]



     A DAY A DAY                                    MOUTH           e



                                                  TWICE A           



                                                  DAY            

 

     furosemide furosemide           No                       furosemide        

   [facility]



     20 mg 20 mg                                    20 mg           [facility]



     tablet TAKE tablet TAKE                                    tablet          

 [facility]



     1 TABLET BY 1 TABLET BY                                    TAKE 1          

 [facility]



     MOUTH TWICE MOUTH TWICE                                    TABLET BY       

    [facility]



     A DAY A DAY                                    MOUTH           e



                                                  TWICE A           



                                                  DAY            

 

     levothyroxi levothyroxi           No                       levothyrox      

     [facility]



     ne 125 mcg ne 125 mcg                                    ine 125           

[facility]



     tablet TAKE tablet TAKE                                    mcg tablet      

     dic



     1 TABLET BY 1 TABLET BY                                    TAKE 1          

 [facility]



     MOUTH EVERY MOUTH EVERY                                    TABLET BY       

    [facility]



     DAY IN THE DAY IN THE                                    MOUTH           e



     MORNING ON MORNING ON                                    EVERY DAY         

  



     EMPTY EMPTY                                    IN THE           



     STOMACH STOMACH                                    MORNING ON           



                                                  EMPTY           



                                                  STOMACH           

 

     melatonin melatonin           No                  Q1D  melatonin           

[facility]



     10 mg 10 mg                                    10 mg           [facility]



     tablet Take tablet Take                                    tablet          

 [facility]



     every day every day                                    Take every          

 [facility]



     by oral by oral                                    day by           [facility]



     route. route.                                    oral           e



                                                  route.           

 

     metoprolol metoprolol           No                       metoprolol        

   [facility]



     succinate succinate                                    succinate           

[facility]



      mg  mg                                     mg           

dic



     tablet,exte tablet,exte                                    tablet,ext      

     [facility]



     nded nded                                    ended           [facility]



     release 24 release 24                                    release 24        

   e



     hr TAKE 1 hr TAKE 1                                    hr TAKE 1           



     AND 1/2 AND 1/2                                    AND 1/2           



     TABLETS BY TABLETS BY                                    TABLETS BY        

   



     MOUTH EVERY MOUTH EVERY                                    MOUTH           



     NIGHT NIGHT                                    EVERY           



                                                  NIGHT           

 

     montelukast montelukast           No                       montelukas      

     [facility]



     10 mg 10 mg                                    t 10 mg           [facility]



     tablet TAKE tablet TAKE                                    tablet          

 [facility]



     1 TABLET BY 1 TABLET BY                                    TAKE 1          

 [facility]



     MOUTH EVERY MOUTH EVERY                                    TABLET BY       

    [facility]



     DAY  DAY                                     MOUTH           e



                                                  EVERY DAY           

 

     nateglinide nateglinide           No                       nateglinid      

     [facility]



     60 mg 60 mg                                    e 60 mg           [facility]



     tablet TAKE tablet TAKE                                    tablet          

 [facility]



     1 TABLET 1 TABLET                                    TAKE 1           [facility]

s



     BEFORE BEFORE                                    TABLET           [facility]



     HEAVY MEALS HEAVY MEALS                                    BEFORE          

 e



     UP TO 3 UP TO 3                                    HEAVY           



     TIMES A TIMES A                                    MEALS UP           



     DAY. DAY.                                    TO 3 TIMES           



                                                  A DAY.           

 

     sertraline sertraline           No                       sertraline        

   [facility]



     50 mg 50 mg                                    50 mg           [facility]



     tablet TAKE tablet TAKE                                    tablet          

 [facility]



     1 TABLET BY 1 TABLET BY                                    TAKE 1          

 [facility]



     MOUTH EVERY MOUTH EVERY                                    TABLET BY       

    [facility]



     DAY  DAY                                     MOUTH           e



                                                  EVERY DAY           

 

     Tradjenta 5 Tradjenta 5           No                       Tradjenta       

    [facility]



     mg tablet mg tablet                                    5 mg           [facility]

pe



     TAKE 1 TAKE 1                                    tablet           dic



     TABLET BY TABLET BY                                    TAKE 1           Spo

rts



     MOUTH EVERY MOUTH EVERY                                    TABLET BY       

    [facility]



     DAY  DAY                                     MOUTH           e



                                                  EVERY DAY           

 

     tramadol 50 tramadol 50           No             1    Q6H  tramadol        

   [facility]



     mg tablet mg tablet                                    50 mg           [facility]

[facility]



     Take 1 Take 1                                    tablet           d[facility]



     tablet tablet                                    Take 1           [facility]



     every 6 every 6                                    tablet           [facility]



     hours by hours by                                    every 6           e



     oral route. oral route.                                    hours by        

   



                                                  oral           



                                                  route.           

 

     Vitamin D3 Vitamin D3           No                       Vitamin D3        

   [facility]



     1 tablet 1 tablet                                    1 tablet           [facility]

[facility]



     twice a day twice a day                                    twice a         

  [facility]ic



                                                  day            [facility]



                                                                 [facility]



                                                                 e

 

     zinc 50 mg zinc 50 mg           No                  Q1D  zinc 50 mg        

   [facility]



     tablet Take tablet Take                                    tablet          

 [facility]



     every day every day                                    Take every          

 [facility]



     by oral by oral                                    day by           [facility]



     route. route.                                    oral           [facility]



                                                  route.           e

 

     Accu-Chek Accu-Chek           No                       Accu-Chek           

[facility]



     Nancy Plus Nancy Plus                                    Nancy Plus        

   [facility]



     test strips test strips                                    test           d

ic



     USE 1 STRIP USE 1 STRIP                                    strips USE      

     [facility]



     TO CHECK TO CHECK                                    1 STRIP TO           M

[facility]



     GLUCOSE GLUCOSE                                    CHECK           e



                                                  GLUCOSE           

 

     allopurinol allopurinol           No                       allopurino      

     [facility]



                                                  l              [facility]



                                                                 dic



                                                                 [facility]



                                                                 [facility]



                                                                 e

 

     allopurinol allopurinol           No                       allopurino      

     [facility]



     100 mg 100 mg                                    l 100 mg           [facility]



     tablet TAKE tablet TAKE                                    tablet          

 [facility]



     1 TABLET BY 1 TABLET BY                                    TAKE 1          

 [facility]



     MOUTH TWICE MOUTH TWICE                                    TABLET BY       

    [facility]



     A DAY A DAY                                    MOUTH           e



                                                  TWICE A           



                                                  DAY            

 

     dexlansopra dexlansopra           No                       dexlansopr      

     [facility]



     zole 60 mg zole 60 mg                                    azole 60          

 [facility]



     capsule,bip capsule,bip                                    mg             d

ic



     hase hase                                    capsule,bi           [facility]



     delayed delayed                                    phase           [facility]



     release release                                    delayed           e



     TAKE 1 TAKE 1                                    release           



     CAPSULE BY CAPSULE BY                                    TAKE 1           



     MOUTH EVERY MOUTH EVERY                                    CAPSULE BY      

     



     DAY  DAY                                     MOUTH           



                                                  EVERY DAY           

 

     diltiazem diltiazem           No                       diltiazem           

[facility]



      mg  mg                                     mg           

[facility]



     capsule,ext capsule,ext                                    capsule,ex      

     [facility]



     ended ended                                    tended           [facility]



     release 24 release 24                                    release 24        

   [facility]



     hr TAKE 1 hr TAKE 1                                    hr TAKE 1           

e



     CAPSULE BY CAPSULE BY                                    CAPSULE BY        

   



     MOUTH EVERY MOUTH EVERY                                    MOUTH           



     DAY  DAY                                     EVERY DAY           

 

     Eliquis 5 Eliquis 5           No                       Eliquis 5           

[facility]



     mg tablet mg tablet                                    mg tablet           

[facility]



     TAKE 1 TAKE 1                                    TAKE 1           dic



     TABLET BY TABLET BY                                    TABLET BY           

[facility]



     MOUTH TWICE MOUTH TWICE                                    MOUTH           

[facility]



     A DAY A DAY                                    TWICE A           e



                                                  DAY            

 

     fenofibrate fenofibrate           No                       fenofibrat      

     [facility]



     nanocrystal nanocrystal                                    e              O

[facility]



     lized 145 lized 145                                    nanocrysta          

 [facility]



     mg tablet mg tablet                                    llized 145          

 [facility]



     TAKE ONE TAKE ONE                                    mg tablet           [facility]

[facility]



     TAB ONCE A TAB ONCE A                                    TAKE ONE          

 e



     DAY  DAY                                     TAB ONCE A           



                                                  DAY            

 

     flecainide flecainide           No                       flecainide        

   [facility]



     100 mg 100 mg                                    100 mg           [facility]



     tablet TAKE tablet TAKE                                    tablet          

 [facility]



     1 TABLET BY 1 TABLET BY                                    TAKE 1          

 [facility]



     MOUTH TWICE MOUTH TWICE                                    TABLET BY       

    [facility]



     A DAY A DAY                                    MOUTH           e



                                                  TWICE A           



                                                  DAY            

 

     furosemide furosemide           No                       furosemide        

   [facility]



     20 mg 20 mg                                    20 mg           [facility]



     tablet TAKE tablet TAKE                                    tablet          

 [facility]



     1 TABLET BY 1 TABLET BY                                    TAKE 1          

 [facility]



     MOUTH TWICE MOUTH TWICE                                    TABLET BY       

    [facility]



     A DAY A DAY                                    MOUTH           e



                                                  TWICE A           



                                                  DAY            

 

     levothyroxi levothyroxi           No                       levothyrox      

     [facility]



     ne 125 mcg ne 125 mcg                                    ine 125           

[facility]



     tablet TAKE tablet TAKE                                    mcg tablet      

     dic



     1 TABLET BY 1 TABLET BY                                    TAKE 1          

 [facility]



     MOUTH EVERY MOUTH EVERY                                    TABLET BY       

    [facility]



     DAY IN THE DAY IN THE                                    MOUTH           e



     MORNING ON MORNING ON                                    EVERY DAY         

  



     EMPTY EMPTY                                    IN THE           



     STOMACH STOMACH                                    MORNING ON           



                                                  EMPTY           



                                                  STOMACH           

 

     melatonin melatonin           No                  Q1D  melatonin           

[facility]



     10 mg 10 mg                                    10 mg           [facility]



     tablet Take tablet Take                                    tablet          

 [facility]



     every day every day                                    Take every          

 [facility]



     by oral by oral                                    day by           [facility]



     route. route.                                    oral           e



                                                  route.           

 

     metoprolol metoprolol           No                       metoprolol        

   [facility]



     succinate succinate                                    succinate           

[facility]



      mg  mg                                     mg           

dic



     tablet,exte tablet,exte                                    tablet,ext      

     [facility]



     nded nded                                    ended           [facility]



     release 24 release 24                                    release 24        

   e



     hr TAKE 1 hr TAKE 1                                    hr TAKE 1           



     AND 1/2 AND 1/2                                    AND 1/2           



     TABLETS BY TABLETS BY                                    TABLETS BY        

   



     MOUTH EVERY MOUTH EVERY                                    MOUTH           



     NIGHT NIGHT                                    EVERY           



                                                  NIGHT           

 

     montelukast montelukast           No                       montelukas      

     [facility]



     10 mg 10 mg                                    t 10 mg           [facility]



     tablet TAKE tablet TAKE                                    tablet          

 [facility]



     1 TABLET BY 1 TABLET BY                                    TAKE 1          

 [facility]



     MOUTH EVERY MOUTH EVERY                                    TABLET BY       

    [facility]



     DAY  DAY                                     MOUTH           e



                                                  EVERY DAY           

 

     nateglinide nateglinide           No                       nateglinid      

     [facility]



     60 mg 60 mg                                    e 60 mg           [facility]



     tablet TAKE tablet TAKE                                    tablet          

 [facility]



     1 TABLET 1 TABLET                                    TAKE 1           [facility]

s



     BEFORE BEFORE                                    TABLET           [facility]



     HEAVY MEALS HEAVY MEALS                                    BEFORE          

 e



     UP TO 3 UP TO 3                                    HEAVY           



     TIMES A TIMES A                                    MEALS UP           



     DAY. DAY.                                    TO 3 TIMES           



                                                  A DAY.           

 

     sertraline sertraline           No                       sertraline        

   [facility]



     50 mg 50 mg                                    50 mg           [facility]



     tablet TAKE tablet TAKE                                    tablet          

 [facility]



     1 TABLET BY 1 TABLET BY                                    TAKE 1          

 [facility]



     MOUTH EVERY MOUTH EVERY                                    TABLET BY       

    [facility]



     DAY  DAY                                     MOUTH           e



                                                  EVERY DAY           

 

     Tradjenta 5 Tradjenta 5           No                       Tradjenta       

    [facility]



     mg tablet mg tablet                                    5 mg           [facility]

pe



     TAKE 1 TAKE 1                                    tablet           dic



     TABLET BY TABLET BY                                    TAKE 1           Spo

rts



     MOUTH EVERY MOUTH EVERY                                    TABLET BY       

    [facility]



     DAY  DAY                                     MOUTH           e



                                                  EVERY DAY           

 

     tramadol 50 tramadol 50           No                       tramadol        

   [facility]



     mg tablet mg tablet                                    50 mg           [facility]

[facility]



     TAKE 1 TAKE 1                                    tablet           dic



     TABLET BY TABLET BY                                    TAKE 1           Spo

rts



     MOUTH EVERY MOUTH EVERY                                    TABLET BY       

    [facility]



     6 HOURS 6 HOURS                                    MOUTH           e



                                                  EVERY 6           



                                                  HOURS           

 

     Vitamin D3 Vitamin D3           No                       Vitamin D3        

   [facility]



     1 tablet 1 tablet                                    1 tablet           [facility]

[facility]



     twice a day twice a day                                    twice a         

  dic



                                                  day            [facility]



                                                                 [facility]



                                                                 e

 

     zinc 50 mg zinc 50 mg           No                  Q1D  zinc 50 mg        

   [facility]



     tablet Take tablet Take                                    tablet          

 [facility]



     every day every day                                    Take every          

 [facility]



     by oral by oral                                    day by           [facility]



     route. route.                                    oral           [facility]



                                                  route.           e

 

     Accu-Chek Accu-Chek           No                       Accu-Chek           

[facility]



     Nancy Plus Nancy Plus                                    Nancy Plus        

   [facility]



     test strips test strips                                    test           d

ic



     USE 1 STRIP USE 1 STRIP                                    strips USE      

     [facility]



     TO CHECK TO CHECK                                    1 STRIP TO           [facility]



     GLUCOSE GLUCOSE                                    CHECK           e



     DAILY DX DAILY DX                                    GLUCOSE           



     E11.9 E11.9                                    DAILY DX           



                                                  E11.9           

 

     allopurinol allopurinol           No                       allopurino      

     [facility]



     100 mg 100 mg                                    l 100 mg           [facility]



     tablet TAKE tablet TAKE                                    tablet          

 [facility]



     1 TABLET BY 1 TABLET BY                                    TAKE 1          

 [facility]



     MOUTH TWICE MOUTH TWICE                                    TABLET BY       

    [facility]



     A DAY A DAY                                    MOUTH           e



                                                  TWICE A           



                                                  DAY            

 

     azithromyci azithromyci           No                       azithromyc      

     [facility]



     n 250 mg n 250 mg                                    in 250 mg           [facility]

[facility]



     tablet TAKE tablet TAKE                                    tablet          

 [facility]



     2 TABLETS 2 TABLETS                                    TAKE 2           Spo

rts



     BY MOUTH BY MOUTH                                    TABLETS BY           [facility]



     TODAY, THEN TODAY, THEN                                    MOUTH           

e



     TAKE 1 TAKE 1                                    TODAY,           



     TABLET TABLET                                    THEN TAKE           



     DAILY FOR 4 DAILY FOR 4                                    1 TABLET        

   



     DAYS DAYS                                    DAILY FOR           



                                                  4 DAYS           

 

     benzonatate benzonatate           No                       benzonatat      

     [facility]



     100 mg 100 mg                                    e 100 mg           [facility]



     capsule capsule                                    capsule           dic



                                                                 [facility]



                                                                 [facility]



                                                                 e

 

     cholecalcif cholecalcif           No                       cholecalci      

     [facility]



     ronnell ronnell                                    ferol           [facility]



     (vitamin (vitamin                                    (vitamin           dic



     D3) 1,250 D3) 1,250                                    D3) 1,250           

[facility]



     mcg (50,000 mcg (50,000                                    mcg            M

[facility]



     unit) unit)                                    (50,000           e



     capsule capsule                                    unit)           



     TAKE 1 TAKE 1                                    capsule           



     CAPSULE BY CAPSULE BY                                    TAKE 1           



     MOUTH ONCE MOUTH ONCE                                    CAPSULE BY        

   



     A MONTH A MONTH                                    MOUTH ONCE           



                                                  A MONTH           

 

     dexlansopra dexlansopra           No                       dexlansopr      

     [facility]



     zole 60 mg zole 60 mg                                    azole 60          

 [facility]



     capsule,bip capsule,bip                                    mg             d

ic



     hase hase                                    capsule,bi           [facility]



     delayed delayed                                    phase           [facility]



     release release                                    delayed           e



     TAKE 1 TAKE 1                                    release           



     CAPSULE BY CAPSULE BY                                    TAKE 1           



     MOUTH DAILY MOUTH DAILY                                    CAPSULE BY      

     



     FOR 30 FOR 30                                    MOUTH           



     DAYS. DAYS.                                    DAILY FOR           



                                                  30 DAYS.           

 

     diltiazem diltiazem           No                       diltiazem           

[facility]



      mg  mg                                     mg           

[facility]



     capsule,ext capsule,ext                                    capsule,ex      

     dic



     ended ended                                    tended           [facility]



     release 24 release 24                                    release 24        

   [facility]



     hr TAKE 1 hr TAKE 1                                    hr TAKE 1           

e



     CAPSULE BY CAPSULE BY                                    CAPSULE BY        

   



     MOUTH EVERY MOUTH EVERY                                    MOUTH           



     DAY  DAY                                     EVERY DAY           

 

     Eliquis 5 Eliquis 5           No                       Eliquis 5           

[facility]



     mg tablet mg tablet                                    mg tablet           

[facility]



     TAKE 1 TAKE 1                                    TAKE 1           dic



     TABLET BY TABLET BY                                    TABLET BY           

[facility]



     MOUTH TWICE MOUTH TWICE                                    MOUTH           

[facility]



     A DAY A DAY                                    TWICE A           e



                                                  DAY            

 

     famotidine famotidine           No                       famotidine        

   [facility]



     20 mg 20 mg                                    20 mg           [facility]



     tablet TAKE tablet TAKE                                    tablet          

 [facility]



     1 TABLET BY 1 TABLET BY                                    TAKE 1          

 [facility]



     MOUTH TWICE MOUTH TWICE                                    TABLET BY       

    [facility]



     A DAY A DAY                                    MOUTH           e



                                                  TWICE A           



                                                  DAY            

 

     fenofibrate fenofibrate           No                       fenofibrat      

     [facility]



     micronized micronized                                    e              [facility]

[facility]



     134 mg 134 mg                                    micronized           dic



     capsule capsule                                    134 mg           [facility]



     TAKE 1 TAKE 1                                    capsule           [facility]



     CAPSULE BY CAPSULE BY                                    TAKE 1           e



     MOUTH EVERY MOUTH EVERY                                    CAPSULE BY      

     



     DAY  DAY                                     MOUTH           



                                                  EVERY DAY           

 

     fenofibrate fenofibrate           No                       fenofibrat      

     [facility]



     nanocrystal nanocrystal                                    e              O

[facility]



     lized 145 lized 145                                    nanocrysta          

 [facility]



     mg tablet mg tablet                                    llized 145          

 [facility]



     TAKE ONE TAKE ONE                                    mg tablet           Me

[facility]



     TAB ONCE A TAB ONCE A                                    TAKE ONE          

 e



     DAY  DAY                                     TAB ONCE A           



                                                  DAY            

 

     flecainide flecainide           No                       flecainide        

   [facility]



     100 mg 100 mg                                    100 mg           [facility]



     tablet TAKE tablet TAKE                                    tablet          

 [facility]



     1 TABLET BY 1 TABLET BY                                    TAKE 1          

 [facility]



     MOUTH TWICE MOUTH TWICE                                    TABLET BY       

    [facility]



     A DAY A DAY                                    MOUTH           e



                                                  TWICE A           



                                                  DAY            

 

     furosemide furosemide           No                       furosemide        

   [facility]



     20 mg 20 mg                                    20 mg           [facility]



     tablet TAKE tablet TAKE                                    tablet          

 [facility]



     1 TABLET BY 1 TABLET BY                                    TAKE 1          

 [facility]



     MOUTH TWICE MOUTH TWICE                                    TABLET BY       

    [facility]



     A DAY A DAY                                    MOUTH           e



                                                  TWICE A           



                                                  DAY            

 

     gabapentin gabapentin           No                       gabapentin        

   [facility]



     100 mg 100 mg                                    100 mg           [facility]



     capsule capsule                                    capsule           dic



     TAKE 1-2 TAKE 1-2                                    TAKE 1-2           Spo

rts



     CAPSULE BY CAPSULE BY                                    CAPSULE BY        

   [facility]



     MOUTH EVERY MOUTH EVERY                                    MOUTH           

e



     DAY IN THE DAY IN THE                                    EVERY DAY         

  



     EVENING OR EVENING OR                                    IN THE           



     AT BEDTIME AT BEDTIME                                    EVENING OR        

   



                                                  AT BEDTIME           

 

     hydrocodone hydrocodone           No                       hydrocodon      

     [facility]



     10   10                                      e 10           [facility]



     mg-chlorphe mg-chlorphe                                    mg-chlorph      

     dic



     niramine 8 niramine 8                                    eniramine         

  [facility]



     mg/5 mL mg/5 mL                                    8 mg/5 mL           [facility]

[facility]



     oral susp oral susp                                    oral susp           

e



     extend.rel extend.rel                                    extend.rel        

   



     12hr 12hr                                    12hr           

 

     irbesartan irbesartan           No                       irbesartan        

   [facility]



     150 mg 150 mg                                    150 mg           [facility]



     tablet TAKE tablet TAKE                                    tablet          

 [facility]



     1 TABLET BY 1 TABLET BY                                    TAKE 1          

 [facility]



     MOUTH DAILY MOUTH DAILY                                    TABLET BY       

    [facility]



     FOR 30 FOR 30                                    MOUTH           e



     DAYS. DAYS.                                    DAILY FOR           



                                                  30 DAYS.           

 

     levalbutero levalbutero           No                       levalbuter      

     [facility]



     l HFA 45 l HFA 45                                    ol HFA 45           [facility]

[facility]



     mcg/actuati mcg/actuati                                    mcg/actuat      

     dic



     on aerosol on aerosol                                    ion            Spo

rts



     inhaler inhaler                                    aerosol           [facility]

n



     INHALE 2 INHALE 2                                    inhaler           e



     PUFFS BY PUFFS BY                                    INHALE 2           



     MOUTH EVERY MOUTH EVERY                                    PUFFS BY        

   



     4 HOURS AS 4 HOURS AS                                    MOUTH           



     NEEDED NEEDED                                    EVERY 4           



                                                  HOURS AS           



                                                  NEEDED           

 

     levothyroxi levothyroxi           No                       levothyrox      

     [facility]



     ne 125 mcg ne 125 mcg                                    ine 125           

[facility]



     tablet TAKE tablet TAKE                                    mcg tablet      

     dic



     1 TABLET BY 1 TABLET BY                                    TAKE 1          

 [facility]



     MOUTH EVERY MOUTH EVERY                                    TABLET BY       

    [facility]



     DAY IN THE DAY IN THE                                    MOUTH           e



     MORNING ON MORNING ON                                    EVERY DAY         

  



     EMPTY EMPTY                                    IN THE           



     STOMACH STOMACH                                    MORNING ON           



                                                  EMPTY           



                                                  STOMACH           

 

     methocarbam methocarbam           No                       methocarba      

     [facility]



     ol 500 mg ol 500 mg                                    mol 500 mg          

 [facility]



     tablet TAKE tablet TAKE                                    tablet          

 [facility]



     1 TABLET BY 1 TABLET BY                                    TAKE 1          

 [facility]



     MOUTH TWO MOUTH TWO                                    TABLET BY           

[facility]



     TIMES DAILY TIMES DAILY                                    MOUTH TWO       

    e



     AS NEEDED AS NEEDED                                    TIMES           



                                                  DAILY AS           



                                                  NEEDED           

 

     metoprolol metoprolol           No                       metoprolol        

   [facility]



     succinate succinate                                    succinate           

[facility]



      mg  mg                                     mg           

dic



     tablet,exte tablet,exte                                    tablet,ext      

     [facility]



     nded nded                                    ended           [facility]



     release 24 release 24                                    release 24        

   e



     hr TAKE 1 hr TAKE 1                                    hr TAKE 1           



     AND 1/2 AND 1/2                                    AND 1/2           



     TABLETS BY TABLETS BY                                    TABLETS BY        

   



     MOUTH EVERY MOUTH EVERY                                    MOUTH           



     NIGHT NIGHT                                    EVERY           



                                                  NIGHT           

 

     montelukast montelukast           No                       montelukas      

     [facility]



     10 mg 10 mg                                    t 10 mg           [facility]



     tablet TAKE tablet TAKE                                    tablet          

 [facility]



     1 TABLET BY 1 TABLET BY                                    TAKE 1          

 [facility]



     MOUTH EVERY MOUTH EVERY                                    TABLET BY       

    [facility]



     DAY  DAY                                     MOUTH           e



                                                  EVERY DAY           

 

     nateglinide nateglinide           No                       nateglinid      

     [facility]



     60 mg 60 mg                                    e 60 mg           [facility]



     tablet TAKE tablet TAKE                                    tablet          

 [facility]



     1 TABLET 1 TABLET                                    TAKE 1           [facility]

s



     BEFORE BEFORE                                    TABLET           [facility]



     HEAVY MEALS HEAVY MEALS                                    BEFORE          

 e



     UP TO 3 UP TO 3                                    HEAVY           



     TIMES A TIMES A                                    MEALS UP           



     DAY. DAY.                                    TO 3 TIMES           



                                                  A DAY.           

 

     Nexium 40 Nexium 40           No                       Nexium 40           

[facility]



     mg   mg                                      mg             [facility]



     capsule,del capsule,del                                    capsule,de      

     dic



     ayed ayed                                    layed           [facility]



     release TK release TK                                    release TK        

   [facility]



     1 TAB PO 1 TAB PO                                    1 TAB PO           e



     DAILY DAILY                                    DAILY           

 

     nitrofurant nitrofurant           No                       nitrofuran      

     [facility]



     oin  oin                                     toin           [facility]



     monohydrate monohydrate                                    monohydrat      

     dic



     /macrocryst /macrocryst                                    e/macrocry      

     [facility]



     als 100 mg als 100 mg                                    stals 100         

  [facility]



     capsule capsule                                    mg capsule           e



     TAKE 1 TAKE 1                                    TAKE 1           



     CAPSULE CAPSULE                                    CAPSULE           



     (100 MG (100 MG                                    (100 MG           



     TOTAL) BY TOTAL) BY                                    TOTAL) BY           



     MOUTH TWICE MOUTH TWICE                                    MOUTH           



     A DAY FOR 3 A DAY FOR 3                                    TWICE A         

  



     DAYS DAYS                                    DAY FOR 3           



                                                  DAYS           

 

     omeprazole omeprazole           No                       omeprazole        

   [facility]



     40 mg 40 mg                                    40 mg           [facility]



     capsule,del capsule,del                                    capsule,de      

     dic



     ayed ayed                                    layed           [facility]



     release release                                    release           [facility]

n



     TAKE 1 TAKE 1                                    TAKE 1           e



     CAPSULE BY CAPSULE BY                                    CAPSULE BY        

   



     MOUTH EVERY MOUTH EVERY                                    MOUTH           



     DAY  DAY                                     EVERY DAY           

 

     prednisone prednisone           No                       prednisone        

   [facility]



     10 mg 10 mg                                    10 mg           [facility]



     tablet TAKE tablet TAKE                                    tablet          

 [facility]



     1 TABLET 1 TABLET                                    TAKE 1           [facility]

s



     ORALLY ORALLY                                    TABLET           [facility]



     TWICE DAILY TWICE DAILY                                    ORALLY          

 e



     WITH FOOD. WITH FOOD.                                    TWICE           



                                                  DAILY WITH           



                                                  FOOD.           

 

     sertraline sertraline           No                       sertraline        

   [facility]



     50 mg 50 mg                                    50 mg           [facility]



     tablet TAKE tablet TAKE                                    tablet          

 [facility]



     1 TABLET BY 1 TABLET BY                                    TAKE 1          

 [facility]



     MOUTH EVERY MOUTH EVERY                                    TABLET BY       

    [facility]



     DAY  DAY                                     MOUTH           e



                                                  EVERY DAY           

 

     Tradjenta 5 Tradjenta 5           No                       Tradjenta       

    [facility]



     mg tablet mg tablet                                    5 mg           [facility]

pe



     TAKE 1 TAKE 1                                    tablet           dic



     TABLET BY TABLET BY                                    TAKE 1           Spo

rts



     MOUTH EVERY MOUTH EVERY                                    TABLET BY       

    [facility]



     DAY  DAY                                     MOUTH           e



                                                  EVERY DAY           

 

     tramadol 50 tramadol 50           No                       tramadol        

   [facility]



     mg tablet mg tablet                                    50 mg           [facility]

[facility]



     TAKE 1 TAKE 1                                    tablet           dic



     TABLET BY TABLET BY                                    TAKE 1           Spo

rts



     MOUTH EVERY MOUTH EVERY                                    TABLET BY       

    [facility]



     6 HOURS AS 6 HOURS AS                                    MOUTH           e



     NEEDED FOR NEEDED FOR                                    EVERY 6           



     ACUTE PAIN ACUTE PAIN                                    HOURS AS          

 



                                                  NEEDED FOR           



                                                  ACUTE PAIN           

 

     Accu-Chek Accu-Chek           No                       Accu-Chek           

[facility]



     Nancy Plus Nancy Plus                                    Nancy Plus        

   [facility]



     test strips test strips                                    test           d

ic



     USE 1 STRIP USE 1 STRIP                                    strips USE      

     [facility]



     TO CHECK TO CHECK                                    1 STRIP TO           M

[facility]



     GLUCOSE GLUCOSE                                    CHECK           e



     DAILY DX DAILY DX                                    GLUCOSE           



     E11.9 E11.9                                    DAILY DX           



                                                  E11.9           

 

     allopurinol allopurinol           No                       allopurino      

     [facility]



     100 mg 100 mg                                    l 100 mg           [facility]



     tablet TAKE tablet TAKE                                    tablet          

 [facility]



     1 TABLET BY 1 TABLET BY                                    TAKE 1          

 [facility]



     MOUTH TWICE MOUTH TWICE                                    TABLET BY       

    [facility]



     A DAY A DAY                                    MOUTH           e



                                                  TWICE A           



                                                  DAY            

 

     dexlansopra dexlansopra           No                       dexlansopr      

     [facility]



     zole 60 mg zole 60 mg                                    azole 60          

 [facility]



     capsule,bip capsule,bip                                    mg             d

ic



     hase hase                                    capsule,bi           [facility]



     delayed delayed                                    phase           [facility]



     release release                                    delayed           e



     TAKE 1 TAKE 1                                    release           



     CAPSULE BY CAPSULE BY                                    TAKE 1           



     MOUTH DAILY MOUTH DAILY                                    CAPSULE BY      

     



     FOR 30 FOR 30                                    MOUTH           



     DAYS. DAYS.                                    DAILY FOR           



                                                  30 DAYS.           

 

     diltiazem diltiazem           No                       diltiazem           

[facility]



      mg  mg                                     mg           

[facility]



     capsule,ext capsule,ext                                    capsule,ex      

     dic



     ended ended                                    tended           [facility]



     release 24 release 24                                    release 24        

   [facility]



     hr TAKE 1 hr TAKE 1                                    hr TAKE 1           

e



     CAPSULE BY CAPSULE BY                                    CAPSULE BY        

   



     MOUTH EVERY MOUTH EVERY                                    MOUTH           



     DAY  DAY                                     EVERY DAY           

 

     Eliquis 5 Eliquis 5           No                       Eliquis 5           

[facility]



     mg tablet mg tablet                                    mg tablet           

[facility]



     TAKE 1 TAKE 1                                    TAKE 1           dic



     TABLET BY TABLET BY                                    TABLET BY           

[facility]



     MOUTH TWICE MOUTH TWICE                                    MOUTH           

[facility]



     A DAY A DAY                                    TWICE A           e



                                                  DAY            

 

     fenofibrate fenofibrate           No                       fenofibrat      

     [facility]



     nanocrystal nanocrystal                                    e              O

[facility]



     lized 145 lized 145                                    nanocrysta          

 [facility]



     mg tablet mg tablet                                    llized 145          

 [facility]



     TAKE ONE TAKE ONE                                    mg tablet           Me

[facility]icin



     TAB ONCE A TAB ONCE A                                    TAKE ONE          

 e



     DAY  DAY                                     TAB ONCE A           



                                                  DAY            

 

     flecainide flecainide           No                       flecainide        

   [facility]



     100 mg 100 mg                                    100 mg           [facility]



     tablet TAKE tablet TAKE                                    tablet          

 [facility]



     1 TABLET BY 1 TABLET BY                                    TAKE 1          

 [facility]



     MOUTH TWICE MOUTH TWICE                                    TABLET BY       

    [facility]



     A DAY A DAY                                    MOUTH           e



                                                  TWICE A           



                                                  DAY            

 

     furosemide furosemide           No                       furosemide        

   [facility]



     20 mg 20 mg                                    20 mg           [facility]



     tablet TAKE tablet TAKE                                    tablet          

 [facility]



     1 TABLET BY 1 TABLET BY                                    TAKE 1          

 [facility]



     MOUTH TWICE MOUTH TWICE                                    TABLET BY       

    [facility]



     A DAY A DAY                                    MOUTH           e



                                                  TWICE A           



                                                  DAY            

 

     levothyroxi levothyroxi           No                       levothyrox      

     [facility]



     ne 125 mcg ne 125 mcg                                    ine 125           

[facility]



     tablet TAKE tablet TAKE                                    mcg tablet      

     dic



     1 TABLET BY 1 TABLET BY                                    TAKE 1          

 [facility]



     MOUTH EVERY MOUTH EVERY                                    TABLET BY       

    [facility]



     DAY IN THE DAY IN THE                                    MOUTH           e



     MORNING ON MORNING ON                                    EVERY DAY         

  



     EMPTY EMPTY                                    IN THE           



     STOMACH STOMACH                                    MORNING ON           



                                                  EMPTY           



                                                  STOMACH           

 

     melatonin melatonin           No                  Q1D  melatonin           

[facility]



     10 mg 10 mg                                    10 mg           [facility]



     tablet Take tablet Take                                    tablet          

 [facility]



     every day every day                                    Take every          

 [facility]



     by oral by oral                                    day by           [facility]



     route. route.                                    oral           e



                                                  route.           

 

     metoprolol metoprolol           No                       metoprolol        

   [facility]



     succinate succinate                                    succinate           

[facility]



      mg  mg                                     mg           

dic



     tablet,exte tablet,exte                                    tablet,ext      

     [facility]



     nded nded                                    ended           [facility]



     release 24 release 24                                    release 24        

   e



     hr TAKE 1 hr TAKE 1                                    hr TAKE 1           



     AND 1/2 AND 1/2                                    AND 1/2           



     TABLETS BY TABLETS BY                                    TABLETS BY        

   



     MOUTH EVERY MOUTH EVERY                                    MOUTH           



     NIGHT NIGHT                                    EVERY           



                                                  NIGHT           

 

     montelukast montelukast           No                       montelukas      

     [facility]



     10 mg 10 mg                                    t 10 mg           [facility]



     tablet TAKE tablet TAKE                                    tablet          

 [facility]



     1 TABLET BY 1 TABLET BY                                    TAKE 1          

 [facility]



     MOUTH EVERY MOUTH EVERY                                    TABLET BY       

    [facility]



     DAY  DAY                                     MOUTH           e



                                                  EVERY DAY           

 

     nateglinide nateglinide           No                       nateglinid      

     [facility]



     60 mg 60 mg                                    e 60 mg           [facility]



     tablet TAKE tablet TAKE                                    tablet          

 [facility]



     1 TABLET 1 TABLET                                    TAKE 1           [facility]

s



     BEFORE BEFORE                                    TABLET           [facility]



     HEAVY MEALS HEAVY MEALS                                    BEFORE          

 e



     UP TO 3 UP TO 3                                    HEAVY           



     TIMES A TIMES A                                    MEALS UP           



     DAY. DAY.                                    TO 3 TIMES           



                                                  A DAY.           

 

     sertraline sertraline           No                       sertraline        

   [facility]



     50 mg 50 mg                                    50 mg           [facility]



     tablet TAKE tablet TAKE                                    tablet          

 [facility]



     1 TABLET BY 1 TABLET BY                                    TAKE 1          

 [facility]



     MOUTH EVERY MOUTH EVERY                                    TABLET BY       

    [facility]



     DAY  DAY                                     MOUTH           e



                                                  EVERY DAY           

 

     Tradjenta 5 Tradjenta 5           No                       Tradjenta       

    [facility]



     mg tablet mg tablet                                    5 mg           [facility]

pe



     TAKE 1 TAKE 1                                    tablet           dic



     TABLET BY TABLET BY                                    TAKE 1           Spo

rts



     MOUTH EVERY MOUTH EVERY                                    TABLET BY       

    [facility]



     DAY  DAY                                     MOUTH           e



                                                  EVERY DAY           

 

     Vitamin D3 Vitamin D3           No                       Vitamin D3        

   [facility]



     1 tablet 1 tablet                                    1 tablet           [facility]

[facility]



     twice a day twice a day                                    twice a         

  dic



                                                  day            [facility]



                                                                 [facility]



                                                                 e

 

     zinc 50 mg zinc 50 mg           No                  Q1D  zinc 50 mg        

   [facility]



     tablet Take tablet Take                                    tablet          

 [facility]



     every day every day                                    Take every          

 [facility]



     by oral by oral                                    day by           [facility]



     route. route.                                    oral           [facility]



                                                  route.           e

 

     Accu-Chek Accu-Chek           No                       Accu-Chek           

[facility]



     Nancy Plus Nancy Plus                                    Nancy Plus        

   [facility]



     test strips test strips                                    test           d

ic



     USE 1 STRIP USE 1 STRIP                                    strips USE      

     [facility]



     TO CHECK TO CHECK                                    1 STRIP TO           M

[facility]



     GLUCOSE GLUCOSE                                    CHECK           e



                                                  GLUCOSE           

 

     allopurinol allopurinol           No                       allopurino      

     [facility]



                                                  l              [facility]



                                                                 dic



                                                                 [facility]



                                                                 [facility]



                                                                 e

 

     allopurinol allopurinol           No                       allopurino      

     [facility]



     100 mg 100 mg                                    l 100 mg           [facility]



     tablet TAKE tablet TAKE                                    tablet          

 [facility]



     1 TABLET BY 1 TABLET BY                                    TAKE 1          

 [facility]



     MOUTH TWICE MOUTH TWICE                                    TABLET BY       

    [facility]



     A DAY A DAY                                    MOUTH           e



                                                  TWICE A           



                                                  DAY            

 

     dexlansopra dexlansopra           No                       dexlansopr      

     [facility]



     zole 60 mg zole 60 mg                                    azole 60          

 [facility]



     capsule,bip capsule,bip                                    mg             d

ic



     hase hase                                    capsule,bi           [facility]



     delayed delayed                                    phase           [facility]



     release release                                    delayed           e



     TAKE 1 TAKE 1                                    release           



     CAPSULE BY CAPSULE BY                                    TAKE 1           



     MOUTH DAILY MOUTH DAILY                                    CAPSULE BY      

     



     FOR 30 FOR 30                                    MOUTH           



     DAYS. DAYS.                                    DAILY FOR           



                                                  30 DAYS.           

 

     diltiazem diltiazem           No                       diltiazem           

[facility]



      mg  mg                                     mg           

[facility]



     capsule,ext capsule,ext                                    capsule,ex      

     dic



     ended ended                                    tended           [facility]



     release 24 release 24                                    release 24        

   [facility]



     hr TAKE 1 hr TAKE 1                                    hr TAKE 1           

e



     CAPSULE BY CAPSULE BY                                    CAPSULE BY        

   



     MOUTH EVERY MOUTH EVERY                                    MOUTH           



     DAY  DAY                                     EVERY DAY           

 

     Eliquis 5 Eliquis 5           No                       Eliquis 5           

[facility]



     mg tablet mg tablet                                    mg tablet           

[facility]



     TAKE 1 TAKE 1                                    TAKE 1           dic



     TABLET BY TABLET BY                                    TABLET BY           

[facility]



     MOUTH TWICE MOUTH TWICE                                    MOUTH           

[facility]



     A DAY A DAY                                    TWICE A           e



                                                  DAY            

 

     fenofibrate fenofibrate           No                       fenofibrat      

     [facility]



     nanocrystal nanocrystal                                    e              O

[facility]



     lized 145 lized 145                                    nanocrysta          

 [facility]



     mg tablet mg tablet                                    llized 145          

 [facility]



     TAKE ONE TAKE ONE                                    mg tablet           [facility]

[facility]



     TAB ONCE A TAB ONCE A                                    TAKE ONE          

 e



     DAY  DAY                                     TAB ONCE A           



                                                  DAY            

 

     flecainide flecainide           No                       flecainide        

   [facility]



     100 mg 100 mg                                    100 mg           [facility]



     tablet TAKE tablet TAKE                                    tablet          

 [facility]



     1 TABLET BY 1 TABLET BY                                    TAKE 1          

 [facility]



     MOUTH TWICE MOUTH TWICE                                    TABLET BY       

    [facility]



     A DAY A DAY                                    MOUTH           e



                                                  TWICE A           



                                                  DAY            

 

     furosemide furosemide           No                       furosemide        

   [facility]



     20 mg 20 mg                                    20 mg           [facility]



     tablet TAKE tablet TAKE                                    tablet          

 [facility]



     1 TABLET BY 1 TABLET BY                                    TAKE 1          

 [facility]



     MOUTH TWICE MOUTH TWICE                                    TABLET BY       

    [facility]



     A DAY A DAY                                    MOUTH           e



                                                  TWICE A           



                                                  DAY            

 

     levothyroxi levothyroxi           No                       levothyrox      

     [facility]



     ne 125 mcg ne 125 mcg                                    ine 125           

[facility]



     tablet TAKE tablet TAKE                                    mcg tablet      

     dic



     1 TABLET BY 1 TABLET BY                                    TAKE 1          

 [facility]



     MOUTH EVERY MOUTH EVERY                                    TABLET BY       

    [facility]



     DAY IN THE DAY IN THE                                    MOUTH           e



     MORNING ON MORNING ON                                    EVERY DAY         

  



     EMPTY EMPTY                                    IN THE           



     STOMACH STOMACH                                    MORNING ON           



                                                  EMPTY           



                                                  STOMACH           

 

     melatonin melatonin           No                  Q1D  melatonin           

[facility]



     10 mg 10 mg                                    10 mg           [facility]



     tablet Take tablet Take                                    tablet          

 [facility]



     every day every day                                    Take every          

 [facility]



     by oral by oral                                    day by           [facility]



     route. route.                                    oral           e



                                                  route.           

 

     metoprolol metoprolol           No                       metoprolol        

   [facility]



     succinate succinate                                    succinate           

[facility]



      mg  mg                                     mg           

dic



     tablet,exte tablet,exte                                    tablet,ext      

     [facility]



     nded nded                                    ended           [facility]



     release 24 release 24                                    release 24        

   e



     hr TAKE 1 hr TAKE 1                                    hr TAKE 1           



     AND 1/2 AND 1/2                                    AND 1/2           



     TABLETS BY TABLETS BY                                    TABLETS BY        

   



     MOUTH EVERY MOUTH EVERY                                    MOUTH           



     NIGHT NIGHT                                    EVERY           



                                                  NIGHT           

 

     montelukast montelukast           No                       montelukas      

     [facility]



     10 mg 10 mg                                    t 10 mg           [facility]



     tablet TAKE tablet TAKE                                    tablet          

 [facility]



     1 TABLET BY 1 TABLET BY                                    TAKE 1          

 [facility]



     MOUTH EVERY MOUTH EVERY                                    TABLET BY       

    [facility]



     DAY  DAY                                     MOUTH           e



                                                  EVERY DAY           

 

     nateglinide nateglinide           No                       nateglinid      

     [facility]



     60 mg 60 mg                                    e 60 mg           [facility]



     tablet TAKE tablet TAKE                                    tablet          

 [facility]



     1 TABLET 1 TABLET                                    TAKE 1           [facility]

s



     BEFORE BEFORE                                    TABLET           [facility]



     HEAVY MEALS HEAVY MEALS                                    BEFORE          

 e



     UP TO 3 UP TO 3                                    HEAVY           



     TIMES A TIMES A                                    MEALS UP           



     DAY. DAY.                                    TO 3 TIMES           



                                                  A DAY.           

 

     sertraline sertraline           No                       sertraline        

   [facility]



     50 mg 50 mg                                    50 mg           [facility]



     tablet TAKE tablet TAKE                                    tablet          

 [facility]



     1 TABLET BY 1 TABLET BY                                    TAKE 1          

 [facility]



     MOUTH EVERY MOUTH EVERY                                    TABLET BY       

    [facility]



     DAY  DAY                                     MOUTH           e



                                                  EVERY DAY           

 

     Tradjenta 5 Tradjenta 5           No                       Tradjenta       

    [facility]



     mg tablet mg tablet                                    5 mg           [facility]

pe



     TAKE 1 TAKE 1                                    tablet           dic



     TABLET BY TABLET BY                                    TAKE 1           Spo

rts



     MOUTH EVERY MOUTH EVERY                                    TABLET BY       

    [facility]



     DAY  DAY                                     MOUTH           e



                                                  EVERY DAY           

 

     Vitamin D3 Vitamin D3           No                       Vitamin D3        

   [facility]



     1 tablet 1 tablet                                    1 tablet           [facility]

[facility]



     twice a day twice a day                                    twice a         

  dic



                                                  day            [facility]



                                                                 [facility]



                                                                 e

 

     zinc 50 mg zinc 50 mg           No                  Q1D  zinc 50 mg        

   [facility]



     tablet Take tablet Take                                    tablet          

 [facility]



     every day every day                                    Take every          

 [facility]



     by oral by oral                                    day by           [facility]



     route. route.                                    oral           [facility]



                                                  route.           e

 

     Accu-Chek Accu-Chek           No                       Accu-Chek           

[facility]



     Nancy Plus Nancy Plus                                    Nancy Plus        

   [facility]



     test strips test strips                                    test           d

ic



     USE 1 STRIP USE 1 STRIP                                    strips USE      

     [facility]



     TO CHECK TO CHECK                                    1 STRIP TO           M

[facility]



     GLUCOSE GLUCOSE                                    CHECK           e



                                                  GLUCOSE           

 

     allopurinol allopurinol           No                       allopurino      

     [facility]



                                                  l              [facility]



                                                                 dic



                                                                 [facility]



                                                                 [facility]



                                                                 e

 

     allopurinol allopurinol           No                       allopurino      

     [facility]



     100 mg 100 mg                                    l 100 mg           [facility]



     tablet TAKE tablet TAKE                                    tablet          

 [facility]



     1 TABLET BY 1 TABLET BY                                    TAKE 1          

 [facility]



     MOUTH TWICE MOUTH TWICE                                    TABLET BY       

    [facility]



     A DAY A DAY                                    MOUTH           e



                                                  TWICE A           



                                                  DAY            

 

     dexlansopra dexlansopra           No                       dexlansopr      

     [facility]



     zole 60 mg zole 60 mg                                    azole 60          

 [facility]



     capsule,bip capsule,bip                                    mg             d

ic



     hase hase                                    capsule,bi           [facility]



     delayed delayed                                    phase           [facility]



     release release                                    delayed           e



     TAKE 1 TAKE 1                                    release           



     CAPSULE BY CAPSULE BY                                    TAKE 1           



     MOUTH DAILY MOUTH DAILY                                    CAPSULE BY      

     



     FOR 30 FOR 30                                    MOUTH           



     DAYS. DAYS.                                    DAILY FOR           



                                                  30 DAYS.           

 

     diltiazem diltiazem           No                       diltiazem           

[facility]



      mg  mg                                     mg           

[facility]



     capsule,ext capsule,ext                                    capsule,ex      

     dic



     ended ended                                    tended           [facility]



     release 24 release 24                                    release 24        

   [facility]



     hr TAKE 1 hr TAKE 1                                    hr TAKE 1           

e



     CAPSULE BY CAPSULE BY                                    CAPSULE BY        

   



     MOUTH EVERY MOUTH EVERY                                    MOUTH           



     DAY  DAY                                     EVERY DAY           

 

     Eliquis 5 Eliquis 5           No                       Eliquis 5           

[facility]



     mg tablet mg tablet                                    mg tablet           

[facility]



     TAKE 1 TAKE 1                                    TAKE 1           dic



     TABLET BY TABLET BY                                    TABLET BY           

[facility]



     MOUTH TWICE MOUTH TWICE                                    MOUTH           

[facility]



     A DAY A DAY                                    TWICE A           e



                                                  DAY            

 

     fenofibrate fenofibrate           No                       fenofibrat      

     [facility]



     nanocrystal nanocrystal                                    e              O

[facility]



     lized 145 lized 145                                    nanocrysta          

 [facility]



     mg tablet mg tablet                                    llized 145          

 [facility]



     TAKE ONE TAKE ONE                                    mg tablet           Me

[facility]



     TAB ONCE A TAB ONCE A                                    TAKE ONE          

 e



     DAY  DAY                                     TAB ONCE A           



                                                  DAY            

 

     flecainide flecainide           No                       flecainide        

   [facility]



     100 mg 100 mg                                    100 mg           [facility]



     tablet TAKE tablet TAKE                                    tablet          

 [facility]



     1 TABLET BY 1 TABLET BY                                    TAKE 1          

 [facility]



     MOUTH TWICE MOUTH TWICE                                    TABLET BY       

    [facility]



     A DAY A DAY                                    MOUTH           e



                                                  TWICE A           



                                                  DAY            

 

     furosemide furosemide           No                       furosemide        

   [facility]



     20 mg 20 mg                                    20 mg           [facility]



     tablet TAKE tablet TAKE                                    tablet          

 [facility]



     1 TABLET BY 1 TABLET BY                                    TAKE 1          

 [facility]



     MOUTH TWICE MOUTH TWICE                                    TABLET BY       

    [facility]



     A DAY A DAY                                    MOUTH           e



                                                  TWICE A           



                                                  DAY            

 

     levothyroxi levothyroxi           No                       levothyrox      

     [facility]



     ne 125 mcg ne 125 mcg                                    ine 125           

[facility]



     tablet TAKE tablet TAKE                                    mcg tablet      

     dic



     1 TABLET BY 1 TABLET BY                                    TAKE 1          

 [facility]



     MOUTH EVERY MOUTH EVERY                                    TABLET BY       

    [facility]



     DAY IN THE DAY IN THE                                    MOUTH           e



     MORNING ON MORNING ON                                    EVERY DAY         

  



     EMPTY EMPTY                                    IN THE           



     STOMACH STOMACH                                    MORNING ON           



                                                  EMPTY           



                                                  STOMACH           

 

     melatonin melatonin           No                  Q1D  melatonin           

[facility]



     10 mg 10 mg                                    10 mg           [facility]



     tablet Take tablet Take                                    tablet          

 [facility]



     every day every day                                    Take every          

 [facility]



     by oral by oral                                    day by           [facility]



     route. route.                                    oral           e



                                                  route.           

 

     metoprolol metoprolol           No                       metoprolol        

   [facility]



     succinate succinate                                    succinate           

[facility]



      mg  mg                                     mg           

dic



     tablet,exte tablet,exte                                    tablet,ext      

     [facility]



     nded nded                                    ended           [facility]



     release 24 release 24                                    release 24        

   e



     hr TAKE 1 hr TAKE 1                                    hr TAKE 1           



     AND 1/2 AND /2                                    AND 1/2           



     TABLETS BY TABLETS BY                                    TABLETS BY        

   



     MOUTH EVERY MOUTH EVERY                                    MOUTH           



     NIGHT NIGHT                                    EVERY           



                                                  NIGHT           

 

     montelukast montelukast           No                       montelukas      

     [facility]



     10 mg 10 mg                                    t 10 mg           [facility]



     tablet TAKE tablet TAKE                                    tablet          

 [facility]



     1 TABLET BY 1 TABLET BY                                    TAKE 1          

 [facility]



     MOUTH EVERY MOUTH EVERY                                    TABLET BY       

    [facility]



     DAY  DAY                                     MOUTH           e



                                                  EVERY DAY           

 

     nateglinide nateglinide           No                       nateglinid      

     [facility]



     60 mg 60 mg                                    e 60 mg           [facility]



     tablet TAKE tablet TAKE                                    tablet          

 [facility]



     1 TABLET 1 TABLET                                    TAKE 1           [facility]

s



     BEFORE BEFORE                                    TABLET           [facility]



     HEAVY MEALS HEAVY MEALS                                    BEFORE          

 e



     UP TO 3 UP TO 3                                    HEAVY           



     TIMES A TIMES A                                    MEALS UP           



     DAY. DAY.                                    TO 3 TIMES           



                                                  A DAY.           

 

     sertraline sertraline           No                       sertraline        

   [facility]



     50 mg 50 mg                                    50 mg           [facility]



     tablet TAKE tablet TAKE                                    tablet          

 [facility]



     1 TABLET BY 1 TABLET BY                                    TAKE 1          

 [facility]



     MOUTH EVERY MOUTH EVERY                                    TABLET BY       

    [facility]



     DAY  DAY                                     MOUTH           e



                                                  EVERY DAY           

 

     Tradjenta 5 Tradjenta 5           No                       Tradjenta       

    [facility]



     mg tablet mg tablet                                    5 mg           [facility]

pe



     TAKE 1 TAKE 1                                    tablet           dic



     TABLET BY TABLET BY                                    TAKE 1           Spo

rts



     MOUTH EVERY MOUTH EVERY                                    TABLET BY       

    [facility]



     DAY  DAY                                     MOUTH           e



                                                  EVERY DAY           

 

     Vitamin D3 Vitamin D3           No                       Vitamin D3        

   [facility]



     1 tablet 1 tablet                                    1 tablet           [facility]

[facility]



     twice a day twice a day                                    twice a         

  dic



                                                  day            [facility]



                                                                 [facility]



                                                                 e

 

     zinc 50 mg zinc 50 mg           No                  Q1D  zinc 50 mg        

   [facility]



     tablet Take tablet Take                                    tablet          

 [facility]



     every day every day                                    Take every          

 [facility]



     by oral by oral                                    day by           [facility]



     route. route.                                    oral           [facility]



                                                  route.           e

 

     Accu-Chek Accu-Chek           No                       Accu-Chek           

[facility]



     Nancy Plus Nancy Plus                                    Nancy Plus        

   [facility]



     test strips test strips                                    test           d

ic



     USE 1 STRIP USE 1 STRIP                                    strips USE      

     [facility]



     TO CHECK TO CHECK                                    1 STRIP TO           M

[facility]



     GLUCOSE GLUCOSE                                    CHECK           e



     DAILY DX DAILY DX                                    GLUCOSE           



     E11.9 E11.9                                    DAILY DX           



                                                  E11.9           

 

     allopurinol allopurinol           No                       allopurino      

     [facility]



                                                  l              [facility]



                                                                 dic



                                                                 [facility]



                                                                 [facility]



                                                                 e

 

     allopurinol allopurinol           No                       allopurino      

     [facility]



     100 mg 100 mg                                    l 100 mg           [facility]



     tablet TAKE tablet TAKE                                    tablet          

 [facility]



     1 TABLET BY 1 TABLET BY                                    TAKE 1          

 [facility]



     MOUTH TWICE MOUTH TWICE                                    TABLET BY       

    [facility]



     A DAY. STOP A DAY. STOP                                    MOUTH           

e



     ULORIC ULORIC                                    TWICE A           



                                                  DAY. STOP           



                                                  ULORIC           

 

     dexamethaso dexamethaso           No                       dexamethas      

     [facility]



     ne 0.75 mg ne 0.75 mg                                    one 0.75          

 [facility]



     tablet tablet                                    mg tablet           dic



     PLEASE SEE PLEASE SEE                                    PLEASE SEE        

   [facility]



     ATTACHED ATTACHED                                    ATTACHED           Med

icin



     FOR  FOR                                     FOR            e



     DETAILED DETAILED                                    DETAILED           



     DIRECTIONS DIRECTIONS                                    DIRECTIONS        

   

 

     dexlansopra dexlansopra           No                       dexlansopr      

     [facility]



     zole 60 mg zole 60 mg                                    azole 60          

 [facility]



     capsule,bip capsule,bip                                    mg             d

ic



     hase hase                                    capsule,bi           [facility]



     delayed delayed                                    phase           [facility]



     release release                                    delayed           e



     TAKE 1 TAKE 1                                    release           



     CAPSULE BY CAPSULE BY                                    TAKE 1           



     MOUTH EVERY MOUTH EVERY                                    CAPSULE BY      

     



     DAY  DAY                                     MOUTH           



                                                  EVERY DAY           

 

     diltiazem diltiazem           No                       diltiazem           

[facility]



      mg  mg                                     mg           

[facility]



     capsule,ext capsule,ext                                    capsule,ex      

     dic



     ended ended                                    tended           [facility]



     release 24 release 24                                    release 24        

   [facility]



     hr TAKE 1 hr TAKE 1                                    hr TAKE 1           

e



     CAPSULE BY CAPSULE BY                                    CAPSULE BY        

   



     MOUTH MOUTH                                    MOUTH           



     EVERYDAY EVERYDAY                                    EVERYDAY           

 

     Eliquis 5 Eliquis 5           No                       Eliquis 5           

[facility]



     mg tablet mg tablet                                    mg tablet           

[facility]



     TAKE 1 TAKE 1                                    TAKE 1           dic



     TABLET BY TABLET BY                                    TABLET BY           

[facility]



     MOUTH TWICE MOUTH TWICE                                    MOUTH           

[facility]



     A DAY A DAY                                    TWICE A           e



                                                  DAY            

 

     fenofibrate fenofibrate           No                       fenofibrat      

     [facility]



     nanocrystal nanocrystal                                    e              O

[facility]



     lized 145 lized 145                                    nanocrysta          

 [facility]



     mg tablet mg tablet                                    llized 145          

 [facility]



     TAKE 1 TAKE 1                                    mg tablet           [facility]

n



     TABLET BY TABLET BY                                    TAKE 1           e



     MOUTH EVERY MOUTH EVERY                                    TABLET BY       

    



     DAY  DAY                                     MOUTH           



                                                  EVERY DAY           

 

     flecainide flecainide           No                       flecainide        

   [facility]



     100 mg 100 mg                                    100 mg           [facility]



     tablet TAKE tablet TAKE                                    tablet          

 [facility]



     1 TABLET BY 1 TABLET BY                                    TAKE 1          

 [facility]



     MOUTH TWICE MOUTH TWICE                                    TABLET BY       

    [facility]



     A DAY A DAY                                    MOUTH           e



                                                  TWICE A           



                                                  DAY            

 

     furosemide furosemide           No                       furosemide        

   [facility]



     20 mg 20 mg                                    20 mg           [facility]



     tablet TAKE tablet TAKE                                    tablet          

 [facility]



     1 TABLET BY 1 TABLET BY                                    TAKE 1          

 [facility]



     MOUTH TWICE MOUTH TWICE                                    TABLET BY       

    [facility]



     A DAY A DAY                                    MOUTH           e



                                                  TWICE A           



                                                  DAY            

 

     levothyroxi levothyroxi           No                       levothyrox      

     [facility]



     ne 125 mcg ne 125 mcg                                    ine 125           

[facility]



     tablet TAKE tablet TAKE                                    mcg tablet      

     dic



     1 TABLET BY 1 TABLET BY                                    TAKE 1          

 [facility]



     MOUTH EVERY MOUTH EVERY                                    TABLET BY       

    [facility]



     DAY IN THE DAY IN THE                                    MOUTH           e



     MORNING ON MORNING ON                                    EVERY DAY         

  



     EMPTY EMPTY                                    IN THE           



     STOMACH STOMACH                                    MORNING ON           



                                                  EMPTY           



                                                  STOMACH           

 

     melatonin melatonin           No                  Q1D  melatonin           

[facility]



     10 mg 10 mg                                    10 mg           [facility]



     tablet Take tablet Take                                    tablet          

 [facility]



     every day every day                                    Take every          

 [facility]



     by oral by oral                                    day by           [facility]



     route. route.                                    oral           e



                                                  route.           

 

     metoprolol metoprolol           No                       metoprolol        

   [facility]



     succinate succinate                                    succinate           

[facility]



      mg  mg                                     mg           

dic



     tablet,exte tablet,exte                                    tablet,ext      

     [facility]



     nded nded                                    ended           [facility]



     release 24 release 24                                    release 24        

   e



     hr TAKE 1 hr TAKE 1                                    hr TAKE 1           



     AND 1/2 AND 1/2                                    AND 1/2           



     TABLET BY TABLET BY                                    TABLET BY           



     MOUTH AT MOUTH AT                                    MOUTH AT           



     BEDTIME BEDTIME                                    BEDTIME           

 

     montelukast montelukast           No                       montelukas      

     [facility]



     10 mg 10 mg                                    t 10 mg           [facility]



     tablet TAKE tablet TAKE                                    tablet          

 [facility]



     1 TABLET BY 1 TABLET BY                                    TAKE 1          

 [facility]



     MOUTH EVERY MOUTH EVERY                                    TABLET BY       

    [facility]



     DAY  DAY                                     MOUTH           e



                                                  EVERY DAY           

 

     nateglinide nateglinide           No                       nateglinid      

     [facility]



     60 mg 60 mg                                    e 60 mg           [facility]



     tablet TAKE tablet TAKE                                    tablet          

 [facility]



     1 TABLET BY 1 TABLET BY                                    TAKE 1          

 [facility]



     MOUTH 3 MOUTH 3                                    TABLET BY           [facility]

[facility]



     TIMES A DAY TIMES A DAY                                    MOUTH 3         

  e



     BEFORE BEFORE                                    TIMES A           



     MEALS MEALS                                    DAY BEFORE           



                                                  MEALS           

 

     sertraline sertraline           No                       sertraline        

   [facility]



     50 mg 50 mg                                    50 mg           [facility]



     tablet TAKE tablet TAKE                                    tablet          

 [facility]



     1 TABLET BY 1 TABLET BY                                    TAKE 1          

 [facility]



     MOUTH EVERY MOUTH EVERY                                    TABLET BY       

    [facility]



     DAY  DAY                                     MOUTH           e



                                                  EVERY DAY           

 

     Tradjenta 5 Tradjenta 5           No                       Tradjenta       

    [facility]



     mg tablet mg tablet                                    5 mg           [facility]

[facility]



     TAKE 1 TAKE 1                                    tablet           dic



     TABLET BY TABLET BY                                    TAKE 1           Spo

rts



     MOUTH EVERY MOUTH EVERY                                    TABLET BY       

    [facility]



     DAY  DAY                                     MOUTH           e



                                                  EVERY DAY           

 

     tramadol 50 tramadol 50           No                       tramadol        

   [facility]



     mg tablet mg tablet                                    50 mg           [facility]

[facility]



     TAKE 1 TAKE 1                                    tablet           dic



     TABLET BY TABLET BY                                    TAKE 1           Spo

rts



     MOUTH EVERY MOUTH EVERY                                    TABLET BY       

    [facility]



     6 HOURS 6 HOURS                                    MOUTH           e



                                                  EVERY 6           



                                                  HOURS           

 

     Vitamin D3 Vitamin D3           No                       Vitamin D3        

   [facility]



     1 tablet 1 tablet                                    1 tablet           [facility]

[facility]



     twice a day twice a day                                    twice a         

  dic



                                                  day            [facility]



                                                                 [facility]



                                                                 e

 

     Xifaxan 550 Xifaxan 550           No                       Xifaxan         

  [facility]



     mg tablet mg tablet                                    550 mg           [facility]

[facility]



     TAKE 1 TAKE 1                                    tablet           dic



     TABLET BY TABLET BY                                    TAKE 1           Spo

rts



     MOUTH 3 MOUTH 3                                    TABLET BY           [facility]

[facility]



     TIMES A DAY TIMES A DAY                                    MOUTH 3         

  e



     FOR 14 FOR 14                                    TIMES A           



     DAYS. DAYS.                                    DAY FOR 14           



                                                  DAYS.           

 

     zinc 50 mg zinc 50 mg           No                  Q1D  zinc 50 mg        

   [facility]



     tablet Take tablet Take                                    tablet          

 [facility]



     every day every day                                    Take every          

 [facility]



     by oral by oral                                    day by           [facility]



     route. route.                                    oral           [facility]



                                                  route.           e

 

     Accu-Chek Accu-Chek           No                       Accu-Chek           

[facility]



     Nancy Plus Nancy Plus                                    Nancy Plus        

   [facility]



     test strips test strips                                    test           d

ic



     USE 1 STRIP USE 1 STRIP                                    strips USE      

     [facility]



     TO CHECK TO CHECK                                    1 STRIP TO           M

[facility]



     GLUCOSE GLUCOSE                                    CHECK           e



                                                  GLUCOSE           

 

     allopurinol allopurinol           No                       allopurino      

     [facility]



                                                  l              [facility]



                                                                 dic



                                                                 [facility]



                                                                 [facility]



                                                                 e

 

     allopurinol allopurinol           No                       allopurino      

     [facility]



     100 mg 100 mg                                    l 100 mg           [facility]



     tablet TAKE tablet TAKE                                    tablet          

 [facility]



     1 TABLET BY 1 TABLET BY                                    TAKE 1          

 [facility]



     MOUTH TWICE MOUTH TWICE                                    TABLET BY       

    [facility]



     A DAY A DAY                                    MOUTH           e



                                                  TWICE A           



                                                  DAY            

 

     dexlansopra dexlansopra           No                       dexlansopr      

     [facility]



     zole 60 mg zole 60 mg                                    azole 60          

 [facility]



     capsule,bip capsule,bip                                    mg             d

ic



     hase hase                                    capsule,bi           [facility]



     delayed delayed                                    phase           [facility]



     release release                                    delayed           e



     TAKE 1 TAKE 1                                    release           



     CAPSULE BY CAPSULE BY                                    TAKE 1           



     MOUTH DAILY MOUTH DAILY                                    CAPSULE BY      

     



     FOR 30 FOR 30                                    MOUTH           



     DAYS. DAYS.                                    DAILY FOR           



                                                  30 DAYS.           

 

     diltiazem diltiazem           No                       diltiazem           

[facility]



      mg  mg                                     mg           

[facility]



     capsule,ext capsule,ext                                    capsule,ex      

     dic



     ended ended                                    tended           [facility]



     release 24 release 24                                    release 24        

   [facility]



     hr TAKE 1 hr TAKE 1                                    hr TAKE 1           

e



     CAPSULE BY CAPSULE BY                                    CAPSULE BY        

   



     MOUTH EVERY MOUTH EVERY                                    MOUTH           



     DAY  DAY                                     EVERY DAY           

 

     Eliquis 5 Eliquis 5           No                       Eliquis 5           

[facility]



     mg tablet mg tablet                                    mg tablet           

[facility]



     TAKE 1 TAKE 1                                    TAKE 1           dic



     TABLET BY TABLET BY                                    TABLET BY           

[facility]



     MOUTH TWICE MOUTH TWICE                                    MOUTH           

[facility]



     A DAY A DAY                                    TWICE A           e



                                                  DAY            

 

     fenofibrate fenofibrate           No                       fenofibrat      

     [facility]



     nanocrystal nanocrystal                                    e              O

[facility]



     lized 145 lized 145                                    nanocrysta          

 dic



     mg tablet mg tablet                                    llized 145          

 [facility]



     TAKE ONE TAKE ONE                                    mg tablet           Me

[facility]



     TAB ONCE A TAB ONCE A                                    TAKE ONE          

 e



     DAY  DAY                                     TAB ONCE A           



                                                  DAY            

 

     flecainide flecainide           No                       flecainide        

   [facility]



     100 mg 100 mg                                    100 mg           [facility]



     tablet TAKE tablet TAKE                                    tablet          

 [facility]



     1 TABLET BY 1 TABLET BY                                    TAKE 1          

 [facility]



     MOUTH TWICE MOUTH TWICE                                    TABLET BY       

    [facility]



     A DAY A DAY                                    MOUTH           e



                                                  TWICE A           



                                                  DAY            

 

     furosemide furosemide           No                       furosemide        

   [facility]



     20 mg 20 mg                                    20 mg           [facility]



     tablet TAKE tablet TAKE                                    tablet          

 [facility]



     1 TABLET BY 1 TABLET BY                                    TAKE 1          

 [facility]



     MOUTH TWICE MOUTH TWICE                                    TABLET BY       

    [facility]



     A DAY A DAY                                    MOUTH           e



                                                  TWICE A           



                                                  DAY            

 

     levothyroxi levothyroxi           No                       levothyrox      

     [facility]



     ne 125 mcg ne 125 mcg                                    ine 125           

[facility]



     tablet TAKE tablet TAKE                                    mcg tablet      

     dic



     1 TABLET BY 1 TABLET BY                                    TAKE 1          

 [facility]



     MOUTH EVERY MOUTH EVERY                                    TABLET BY       

    [facility]



     DAY IN THE DAY IN THE                                    MOUTH           e



     MORNING ON MORNING ON                                    EVERY DAY         

  



     EMPTY EMPTY                                    IN THE           



     STOMACH STOMACH                                    MORNING ON           



                                                  EMPTY           



                                                  STOMACH           

 

     melatonin melatonin           No                  Q1D  melatonin           

[facility]



     10 mg 10 mg                                    10 mg           [facility]



     tablet Take tablet Take                                    tablet          

 [facility]



     every day every day                                    Take every          

 [facility]



     by oral by oral                                    day by           [facility]



     route. route.                                    oral           e



                                                  route.           

 

     metoprolol metoprolol           No                       metoprolol        

   [facility]



     succinate succinate                                    succinate           

[facility]



      mg  mg                                     mg           

dic



     tablet,exte tablet,exte                                    tablet,ext      

     [facility]



     nded nded                                    ended           [facility]



     release 24 release 24                                    release 24        

   e



     hr TAKE 1 hr TAKE 1                                    hr TAKE 1           



     AND 1/2 AND 1/2                                    AND 1/2           



     TABLETS BY TABLETS BY                                    TABLETS BY        

   



     MOUTH EVERY MOUTH EVERY                                    MOUTH           



     NIGHT NIGHT                                    EVERY           



                                                  NIGHT           

 

     montelukast montelukast           No                       montelukas      

     [facility]



     10 mg 10 mg                                    t 10 mg           [facility]



     tablet TAKE tablet TAKE                                    tablet          

 [facility]



     1 TABLET BY 1 TABLET BY                                    TAKE 1          

 [facility]



     MOUTH EVERY MOUTH EVERY                                    TABLET BY       

    [facility]



     DAY  DAY                                     MOUTH           e



                                                  EVERY DAY           

 

     nateglinide nateglinide           No                       nateglinid      

     [facility]



     60 mg 60 mg                                    e 60 mg           [facility]



     tablet TAKE tablet TAKE                                    tablet          

 [facility]



     1 TABLET 1 TABLET                                    TAKE 1           [facility]

s



     BEFORE BEFORE                                    TABLET           [facility]



     HEAVY MEALS HEAVY MEALS                                    BEFORE          

 e



     UP TO 3 UP TO 3                                    HEAVY           



     TIMES A TIMES A                                    MEALS UP           



     DAY. DAY.                                    TO 3 TIMES           



                                                  A DAY.           

 

     sertraline sertraline           No                       sertraline        

   [facility]



     50 mg 50 mg                                    50 mg           [facility]



     tablet TAKE tablet TAKE                                    tablet          

 [facility]



     1 TABLET BY 1 TABLET BY                                    TAKE 1          

 [facility]



     MOUTH EVERY MOUTH EVERY                                    TABLET BY       

    [facility]



     DAY  DAY                                     MOUTH           e



                                                  EVERY DAY           

 

     Tradjenta 5 Tradjenta 5           No                       Tradjenta       

    [facility]



     mg tablet mg tablet                                    5 mg           [facility]

[facility]



     TAKE 1 TAKE 1                                    tablet           dic



     TABLET BY TABLET BY                                    TAKE 1           Spo

rts



     MOUTH EVERY MOUTH EVERY                                    TABLET BY       

    [facility]



     DAY  DAY                                     MOUTH           e



                                                  EVERY DAY           

 

     Vitamin D3 Vitamin D3           No                       Vitamin D3        

   [facility]



     1 tablet 1 tablet                                    1 tablet           [facility]

[facility]



     twice a day twice a day                                    twice a         

  dic



                                                  day            [facility]



                                                                 [facility]



                                                                 e

 

     zinc 50 mg zinc 50 mg           No                  Q1D  zinc 50 mg        

   [facility]



     tablet Take tablet Take                                    tablet          

 [facility]



     every day every day                                    Take every          

 [facility]



     by oral by oral                                    day by           [facility]



     route. route.                                    oral           [facility]



                                                  route.           e







Immunizations







           Ordered Immunization Filled Immunization Date       Status     Commen

ts   Source



           Name       Name                                        

 

           MODERNA COVID-19            2021 Completed             [facility]

t



           [facility]            [facility]                         [facility]

 

           MODERNA COVID-19            2021 Completed             [facility]

t



           [facility]            00:00:00                         [facility]







Vital Signs







             Vital Name   Observation Time Observation Value Comments     Source

 

             Height       2023 00:00:00 62 [in_i]                 [facility]

[facility]



                                                                 [facility]

 

             Height       2022-10-17 00:00:00 62 [in_i]                 [facility]

[facility]



                                                                 [facility]

 

             Height       2022 00:00:00 62 [in_i]                 [facility]

[facility]



                                                                 [facility]

 

             BP Diastolic 2022 00:00:00 80 mm[Hg]                 [facility]

[facility]



                                                                 [facility]

 

             Height       2022 00:00:00 62 [in_i]                 [facility]

[facility]



                                                                 [facility]

 

             BMI (Body Mass 2022 00:00:00 40.1 kg/m2                [facility]

 [facility]



             Index)                                              [facility]

 

             BP Systolic  2022 00:00:00 140 mm[Hg]                [facility]

[facility]



                                                                 [facility]

 

             Body Weight  2022 00:00:00 219 [lb_av]               [facility]

[facility]



                                                                 [facility]

 

             BP Diastolic 2022 00:00:00 64 mm[Hg]                 [facility]

[facility]



                                                                 [facility]

 

             Height       2022 00:00:00 63 [in_i]                 [facility]

[facility]



                                                                 [facility]

 

             BMI (Body Mass 2022 00:00:00 38.8 kg/m2                [facility]

 [facility]



             Index)                                              [facility]

 

             BP Systolic  2022 00:00:00 110 mm[Hg]                [facility]

[facility]



                                                                 [facility]

 

             Body Weight  2022 00:00:00 219 [lb_av]               [facility]

[facility]



                                                                 [facility]

 

             Systolic blood 2023 15:46:00 149 mm[Hg]                [facility]

[facility]



             pressure                                            

 

             Diastolic blood 2023 15:46:00 82 mm[Hg]                 [facility]

[facility]



             pressure                                            

 

             Heart rate   2023 15:46:00 76 /min                   [facility]

 

             Body height  2023 15:46:00 157.5 cm                  [facility]

 

             Body weight  2023 15:46:00 104.872 kg                [facility]

 

             BMI          2023 15:46:00 42.29 kg/m2               [facility]

 

             Respiratory rate 2023 18:56:00 18 /min                   [facility]

 

             Oxygen saturation in 2023 18:56:00 95 /min                   

[facility]



             Arterial blood by                                        



             Pulse oximetry                                        

 

             Body temperature 2022 19:45:00 36.67 Ronna                 [facility]

 

             Systolic blood 2022-06-15 19:31:00 152 mm[Hg]                [facility]

[facility]



             pressure                                            

 

             Diastolic blood 2022-06-15 19:31:00 76 mm[Hg]                 [facility] [facility]



             pressure                                            

 

             Heart rate   2022-06-15 19:31:00 81 /min                   [facility]

 

             Body height  2022-06-15 19:31:00 157.5 cm                  [facility]

 

             Body weight  2022-06-15 19:31:00 101.606 kg                [facility]

 

             BMI          2022-06-15 19:31:00 40.97 kg/m2               [facility]







Procedures







                Procedure       Date / Time     Performing      Source



                                Performed       Clinician       

 

                CV CTA CORONARY ARTERIES W 2023      Fady Urias

dist



                CONTRAST        21:23:15                        Hospital

 

                POC CREATININE  2023      Fady Urias



                                20:39:00                        [facility]

 

                ESTIMATED GFR   2023      Fady Urias



                                20:39:00                        [facility]

 

                CT CARDIAC OVERREAD 2023      Fady Urias



                                19:46:45                        [facility]

 

                CV STRESS TEST NUCLEAR CARDIO 2023      Fady Urias

thodist



                                20:45:10                        [facility]

 

                NM MYOCARDIAL PERFUSION 2023      Fady Urias



                                14:51:00                        [facility]

 

                COMPREHENSIVE METABOLIC PANEL 2023      [facility]

[facility]ist



                                16:04:00        [facility]

 

                PHOSPHORUS LEVEL 2023      [facility]



                                16:04:00        [facility]

 

                PARATHYROID HORMONE 2023      [facility]



                                16:04:00        [facility]

 

                PROTEIN, URINE, RANDOM 2023      [facility]



                                16:04:00        [facility]

 

                CREATININE LEVEL, URINE, RANDOM 2023      [facility]



                                16:04:00        [facility]

 

                URINALYSIS, AUTOMATED WITH 2023      [facility]

dist



                MICROSCOPY      16:04:00        [facility]

 

                XR, shoulder, 2 or more view 2023                      [facility]

[facility]



                                00:00:00                        [facility]

 

                SURGICAL PATHOLOGY REQUEST 2022      Liliya Bullock



                                21:15:00                        [facility]

 

                POC GLUCOSE     2022      Liliya Bullock



                                19:26:00                        [facility]

 

                ESOPHAGOGASTRODUODENOSCOPY (EGD) 2022      [facility]



                                19:00:00                        Hospital

 

                ESOPHAGOGASTRODUODENOSCOPY (EGD) 2022      [facility]



                                18:56:00                        Hospital

 

                POC GLUCOSE     2022      Liliya Bullock



                                18:27:00                        Hospital

 

                COVID-19 QUALITATIVE RT-PCR 2022      Liliya Bullock Me

sjodi



                                17:53:00                        Hospital

 

                XR, hand, 3 or more view 2022                      [facility] 

[facility]



                                00:00:00                        [facility]

 

                DEXA, axial skeleton + vertebral 2022                     

 [facility]



                fracture assessment 00:00:00                        [facility]

ine

 

                CBC WITH PLATELET AND DIFFERENTIAL 2022      [facility]



                                14:15:00        [facility]

 

                COMPREHENSIVE METABOLIC PANEL 2022      [facility]



                                14:15:00        [facility]

 

                PHOSPHORUS LEVEL 2022      [facility]



                                14:15:00        [facility]

 

                PARATHYROID HORMONE 2022      [facility]



                                14:15:00        [facility]

 

                PROTEIN, URINE, RANDOM 2022      [facility]



                                14:15:00        [facility]

 

                CREATININE LEVEL, URINE, RANDOM 2022      [facility]



                                14:15:00        [facility]

 

                URINALYSIS, AUTOMATED WITH 2022      [facility]

dist



                MICROSCOPY      14:15:00        [facility]

 

                PROTEIN, URINE, RANDOM 2022      [facility]



                                17:34:00        [facility]

 

                CREATININE LEVEL, URINE, RANDOM 2022      [facility]



                                17:34:00        [facility]

 

                URINALYSIS, AUTOMATED WITH 2022      [facility]

dist



                MICROSCOPY      17:34:00        [facility]

 

                CBC HEMOGRAM    2022      Liliya Bullock



                                16:05:00                        Hospital

 

                TOTAL IRON BINDING CAPACITY 2022      Liliya Bullock Me

sjodi



                                16:05:00                        Hospital

 

                FERRITIN LEVEL  2022      Liliya Bullock



                                16:05:00                        Hospital

 

                VITAMIN B12 LEVEL 2022      Liliya Bullock



                                16:05:00                        Hospital

 

                FOLATE LEVEL    2022      Liliya Bullock



                                16:05:00                        Hospital

 

                CBC WITH PLATELET AND DIFFERENTIAL 2022      [facility]



                                05:00:00        [facility]

 

                COMPREHENSIVE METABOLIC PANEL 2022      [facility]



                                05:00:00        [facility]

 

                PHOSPHORUS LEVEL 2022      [facility]



                                05:00:00        [facility]

 

                PARATHYROID HORMONE 2022      [facility]



                                05:00:00        [facility]

 

                CBC WITH PLATELET AND DIFFERENTIAL 2022      [facility]



                                17:52:00        [facility]

 

                COMPREHENSIVE METABOLIC PANEL 2022      [facility]



                                17:52:00        [facility]

 

                PHOSPHORUS LEVEL 2022      [facility]



                                17:52:00        [facility]

 

                PARATHYROID HORMONE 2022      [facility]



                                17:52:00        [facility]

 

                PROTEIN, URINE, RANDOM 2022      [facility]



                                17:52:00        [facility]

 

                CREATININE LEVEL, URINE, RANDOM 2022      [facility]



                                17:52:00        [facility]

 

                URINALYSIS, AUTOMATED WITH 2022      [facility]

dist



                MICROSCOPY      17:52:00        [facility]

 

                ECG 12-LEAD     2022      Fady Urias



                                19:07:04                        Hospital

 

                URINE CULTURE   2022-01-10      [facility]



                                17:20:00        [facility]

 

                URINALYSIS, AUTOMATED WITH 2022-01-10      [facility]

dist



                MICROSCOPY      17:20:00        [facility]

 

                CBC WITH PLATELET AND DIFFERENTIAL 2022-01-10      [facility]



                                17:20:00        [facility]

 

                COMPREHENSIVE METABOLIC PANEL 2022-01-10      [facility]



                                17:20:00        [facility]

 

                URINALYSIS, COMPLETE, WITH REFLEX 2021      [facility]



                TO CULTURE      16:26:00        [facility]

 

                URINE PROTEIN/CREATININE RATIO, 2021      [facility]



                RANDOM          16:26:00        [facility]

 

                PHOSPHORUS LEVEL 2021      [facility]



                                16:24:00        [facility]

 

                COMPREHENSIVE METABOLIC PANEL 2021      [facility]



                                16:24:00        [facility]

 

                CBC WITH PLATELET AND DIFFERENTIAL 2021      [facility]



                                16:24:00        [facility]

 

                PARATHYROID HORMONE 2021      [facility]



                                16:24:00        [facility]







Plan of Care







             Planned Activity Planned Date Details      Comments     Source

 

             Future Scheduled Test 2023   Screening for              [facility]



                          17:15:59     malignant neoplasm of              



                                       colon (procedure)              



                                       [code = 485515950]              

 

             Future Scheduled Test 2023   Screening for              [facility]



                          17:15:59     malignant neoplasm of              



                                       colon (procedure)              



                                       [code = 218336885]              

 

             Future Scheduled Test 2023   Screening for              [facility]



                          17:15:59     malignant neoplasm of              



                                       colon (procedure)              



                                       [code = 233319694]              

 

             Future Scheduled Test 2023   65+ PNEUMOCOCCAL              [facility]



                          17:15:59     VACCINE (1 - PCV)              



                                       [code = 65+               



                                       PNEUMOCOCCAL VACCINE              



                                       (1 - PCV)]                

 

             Future Scheduled Test 2023   Hepatitis C screening           

   [facility]



                          17:15:59     (procedure) [code =              



                                       299746517]                

 

             Future Scheduled Test 2023   BREAST CANCER              [facility]



                          17:15:59     SCREENING [code =              



                                       BREAST CANCER              



                                       SCREENING]                

 

             Future Scheduled Test 2023   SHINGLES VACCINES (1            

  [facility]



                          17:15:59     of 2) [code =              



                                       SHINGLES VACCINES (1              



                                       of 2)]                    

 

             Future Scheduled Test 2023   COVID-19 VACCINE (3 -           

   [facility]



                          17:15:59     Moderna series) [code              



                                       = COVID-19 VACCINE (3              



                                       - Moderna series)]              

 

             Future Scheduled Test 2023   GASTROSCOPY (EGD)              [facility]



                          17:15:59     [code = GASTROSCOPY              



                                       (EGD)]                    

 

             Future Scheduled Test 2023   INFLUENZA VACCINE              [facility]



                          17:15:59     [code = INFLUENZA              



                                       VACCINE]                  

 

             Future Scheduled Test 2023   Screening for              [facility]



                          17:15:59     malignant neoplasm of              



                                       colon (procedure)              



                                       [code = 283044550]              

 

             Future Scheduled Test 2023   Screening for              [facility]



                          17:15:59     malignant neoplasm of              



                                       colon (procedure)              



                                       [code = 990262497]              

 

             Future Scheduled Test 2022   HEPATITIS B VACCINES            

  [facility]



                          15:22:50     (1 of 3 - 3-dose              



                                       series) [code =              



                                       HEPATITIS B VACCINES              



                                       (1 of 3 - 3-dose              



                                       series)]                  

 

             Future Scheduled Test 2022   65+ PNEUMOCOCCAL              Me

[facility]



                          15:22:50     VACCINE (1 - PCV)              



                                       [code = 65+               



                                       PNEUMOCOCCAL VACCINE              



                                       (1 - PCV)]                

 

             Future Scheduled Test 2022   Hepatitis C screening           

   [facility]



                          15:22:50     (procedure) [code =              



                                       555518107]                

 

             Future Scheduled Test 2022   SHINGLES VACCINES (1            

  [facility]



                          15:22:50     of 2) [code =              



                                       SHINGLES VACCINES (1              



                                       of 2)]                    

 

             Future Scheduled Test 2022   BREAST CANCER              [facility]



                          15:22:50     SCREENING [code =              



                                       BREAST CANCER              



                                       SCREENING]                

 

             Future Scheduled Test 2022   COVID-19 VACCINE (3 -           

   [facility]



                          15:22:50     Booster for Moderna              



                                       series) [code =              



                                       COVID-19 VACCINE (3 -              



                                       Booster for Moderna              



                                       series)]                  

 

             Future Scheduled Test 2022   INFLUENZA VACCINE              [facility]



                          15:22:50     [code = INFLUENZA              



                                       VACCINE]                  

 

             Future Scheduled Test 2022   GASTROSCOPY (EGD)              [facility]



                          15:22:50     [code = GASTROSCOPY              



                                       (EGD)]                    

 

             Future Scheduled Test 2022   COLONOSCOPY SCREENING           

   [facility]



                          15:22:50     [code = COLONOSCOPY              



                                       SCREENING]                

 

             Instructions                                        [facility]

ic



                                                                 Sports Medicine







Encounters







        Start   End     Encounter Admission Attending Care    Care    Encounter 

Source



        Date/Time Date/Time Type    Type    Clinicians Facility Department ID   

   

 

        2023 Kamlesh Urias   1.2.840.1 [id number]

[id number] [facility]



        00:00:00 00:00:00                 Fady MERIDA 27008.1.1         [address]     st



                                                3.430.2.7                 Hospit

a



                                                .3.441200                 l



                                                .8                      

 

        2023 Sravani Fonseca 1.2.840.1 [id number]

[id number] [facility]



        00:00:00 00:00:00                 Karly Robertson50.1.1         469     st



                                                3.430.2.7                 Hospit

a



                                                .3.522018                 l



                                                .8                      

 

        2023 Office          Keyanna, 1.2.840.1 [id number]

[id number] [facility]



        10:30:00 11:57:01 Visit           [facility] 95956.1.1         801     s

t



                                                3.430.2.7                 Hospit

a



                                                .3.706096                 l



                                                .8                      

 

        2023 Travel                  1.2.840.1 1.2.750.303 4303

[id number] [facility]



        00:00:00 00:00:00                         68943.1.1 350.1.13.43 551     

st



                                                3.430.2.7 0.2.7.3.698         Ho

spita



                                                .3.331531 084.8           l



                                                .8                      

 

        2023 [facility]         [facility]     [id number]

[id number]



        00:00:00 00:00:00                 EASTON                   801     Method

i



                                                                        st

 

        2023 Office          Kobe 1.2.840.1 [id number]

[id number] Methodi



        13:30:00 13:40:24 Visit           Liliya  70649.1.1         268     st



                                                3.430.2.7                 Hospit

a



                                                .3.860402                 l



                                                .8                      

 

        2023 Travel                  1.2.840.1 1.2.146.852 0385

[id number] [facility]i



        00:00:00 00:00:00                         20234.1.1 350.1.13.43 366     

st



                                                3.430.2.7 0.2.7.3.698         Ho

spita



                                                .3.868061 084.8           l



                                                .8                      

 

        2023 Out[facility]         [facility]     [id number]

[id number] [address]



        00:00:00 00:00:00                 LILIYA                  268     Method

i



                                                                        st

 

        2023 [place] 15504.2.840.1 [id number]

[id number] [facility]



        14:46:44 23:59:00 Encounter         Fady MERIDA 37624.1.1         522     

st



                                                3.430.2.7                 Hospit

a



                                                .3.770956                 l



                                                .8                      

 

        2023 [facility],   1.2.840.1 [id number]

[id number] Methodi



        13:25:31 14:45:00 Encounter         Fady MERIDA 13329.1.1         702     

st



                                                3.430.2.7                 Hospit

a



                                                .3.288037                 l



                                                .8                      

 

        2023 Outpatient         [facility]     [id number]

[id number]



        00:00:00 00:00:00                 FADY                  702     Method

i



                                                                        st

 

        2023 Outpatient         [facility]     [id number]

[id number]



        00:00:00 00:00:00                 FADY                  522     Method

i



                                                                        st

 

        2023 Travel                  1.2.840.1 1.2.907.693 7949

[id number] Methodi



        00:00:00 00:00:00                         82939.1.1 350.1.13.43 180     

st



                                                3.430.2.7 0.2.7.3.698         Ho

spita



                                                .3.684733 084.8           l



                                                .8                      

 

        2023 Travel                  1.2.840.1 1.2.205.878 3138

[id number] Methodi



        00:00:00 00:00:00                         60168.1.1 350.1.13.43 681     

st



                                                3.430.2.7 0.2.7.3.698         Ho

spita



                                                .3.549251 084.8           l



                                                .8                      

 

        2023 Outpatient         FOG_Monla_Y AOSM    AOSM    572

[id number] Reena



        00:00:00 00:00:00                 Jason                 [id number]  Orthop

e



                                                                        dic



                                                                        Sports



                                                                        Medicin



                                                                        e

 

        2023 Orders          Vangie, 1.2.840.1 [id number] 

[id number] [facility]



        00:00:00 00:00:00 Only            Rachel   35264.1.1         676     st



                                                3.430.2.7                 Hospit

a



                                                .3.515884                 l



                                                .8                      

 

        2023 Travel                  1.2.840.1 1.2.305.945 4773

[id number] Methodi



        00:00:00 00:00:00                         52447.1.1 350.1.13.43 088     

st



                                                3.430.2.7 0.2.7.3.698         Ho

spita



                                                .3.606279 084.8           l



                                                .8                      

 

        2023 Outpatient                 [facility]     [id number]

[id number]



        00:00:00 00:00:00                                         343     Method

i



                                                                        st

 

        2023 Travel                  1.2.840.1 1.2.796.869 [id number]

[id number] Methodi



        00:00:00 00:00:00                         84867.1.1 350.1.13.43 256     

st



                                                3.430.2.7 0.2.7.3.698         Ho

spita



                                                .3.963241 084.8           l



                                                .8                      

 

        2023 Outpatient         HELEN,   [facility]     [id number]

[id number]



        00:00:00 00:00:00                 FADY Fields     Method

i



                                                                        st

 

        2023 Outpatient         FOG_Monla_Y AOSM    AOSM    572

[id number] Reena



        00:00:00 00:00:00                 Jason                 [id number]  Orthop

e



                                                                        dic



                                                                        Sports



                                                                        Medicin



                                                                        e

 

        2023 Kamlesh Bullock, 1.2.840.1 [id number] 210

[id number] Methodi



        00:00:00 00:00:00                 Liliya  56142.1.1         615     st



                                                3.430.2.7                 Hospit

a



                                                .3.311456                 l



                                                .8                      

 

        2023 Office          Helen,   1.2.840.1 [id number] 166963

7728 Methodi



        11:10:00 15:29:05 Visit           Fady MERIDA 69534.1.1         482     st



                                                3.430.2.7                 Hospit

a



                                                .3.826944                 l



                                                .8                      

 

        2023 Travel                  1.2.840.1 1.2.944.970 8340

521849 Methodi



        00:00:00 00:00:00                         83597.1.1 350.1.13.43 238     

st



                                                3.430.2.7 0.2.7.3.698         

spita



                                                .3.918455 084.8           l



                                                .8                      

 

        2023 Outpatient         [facility],   [facility]     [id number]

[id number]



        00:00:00 00:00:00                 FADY                  482     Method

i



                                                                        st

 

        2023 Muhilda M                 [place]OSM    [place] [facility] [place] 2023 Reena



        00:00:00 00:00:00 Anita Ortega MD: [id number]                         FOG_Ofc         dic



                        [facility]         Spo

rts



                        [place],                                         Medicin



                        [place]                                              e



                        [address]                                         



                        , Ph.                                           



                        [phone]                                         

 

        2023 Outpatient         FOG_Monla_Y AOSM    AO    [address]

[address]



        00:00:00 00:00:00                 Jason                 [id number]  Orthop

e



                                                                        dic



                                                                        Sports



                                                                        Medicin



                                                                        e

 

        2023 Outpatient         FOG_Monla_Y AOSM    AO    572

5 Reena



        00:00:00 00:00:00                 Jason                 [id number]  Orthop

e



                                                                        dic



                                                                        Sports



                                                                        Medicin



                                                                        e

 

        2022 [facility] 1.2.840.1 [id number] 21

[id number] 

Methodi



        11:22:00 23:59:00 Encounter         Liliya  07240.1.1         491     st



                                                3.430.2.7                 Hospit

a



                                                .3.650171                 l



                                                .8                      

 

        2022 [facility] 1.2.840.1 [id number] 210

[id number] Methodi



        13:00:00 13:30:00                 Liliya  32156.1.1         286     st



                                                3.430.2.7                 Hospit

a



                                                .3.546851                 l



                                                .8                      

 

        2022 Anesthesia         Marin Pina 1.2.840

.1 [id number] 

[id number]                              Methodi



        12:55:00 13:24:00 Event           Jannet Luna 91913.1.1         966   

  st



                                                3.430.2.7                 Hospit

a



                                                .3.049722                 l



                                                .8                      

 

        2022 [facility]         [facility]     [facility]     

093876 Lee



        00:00:00 00:00:00                 LILIYA                  491     Method

i



                                                                        st

 

        2022 [facility]             Gillianjavyharper, 1.2.840.1 [id number] 210

[id number] Methodi



        13:25:00 13:30:00                 Liliya  05708.1.1         144     st



                                                3.430.2.7                 Hospit

a



                                                .3.583646                 l



                                                .8                      

 

        2022 Travel                  1.2.840.1 1.2.981.070 4834

496047 Methodi



        00:00:00 00:00:00                         65998.1.1 350.1.13.43 142     

st



                                                3.430.2.7 0.2.7.3.698         Ho

spita



                                                .3.420496 084.8           l



                                                .8                      

 

        2022 [facility]          Perlaharper 1.2.840.1 [id number] 

[id number] Methodi



        00:00:00 00:00:00 Only            Liliya  87284.1.1         552     st



                                                3.430.2.7                 Hospit

a



                                                .3.814236                 l



                                                .8                      

 

        2022 [facility]         [facility]     2100

[id number] Lee



        00:00:00 00:00:00                 LILIYA                  144     Method

i



                                                                        st

 

        2022 Telephone         Urias,   1.2.840.1 [id number] 

227579 Methodi



        00:00:00 00:00:00                 Fady MERIDA 43005.1.1         313     st



                                                3.430.2.7                 Hospit

a



                                                .3.712110                 l



                                                .8                      

 

        2022 Outpatient         FOG_Monla_Y AOSM    AOSM    572

2185-20 Reena



        00:00:00 00:00:00                 Jason                 [id number]  Orthop

e



                                                                        dic



                                                                        Sports



                                                                        Medicin



                                                                        e

 

        2022 Office          Keyanna, 1.2.840.9 [id number] 04691

43595 Methodi



        10:00:00 10:30:54 Visit           Easton Babb 31433.1.1         615     s

t



                                                3.430.2.7                 Hospit

a



                                                .3.210183                 l



                                                .8                      

 

        2022 Travel                  1.2.840.1 1.2.990.577 5876

608822 Methodi



        00:00:00 00:00:00                         63920.1.1 350.1.13.43 393     

st



                                                3.430.2.7 0.2.7.3.698         Ho

spita



                                                .3.910014 084.8           l



                                                .8                      

 

        2022 Outpatient         [facility]     [id number]

[address]



        00:00:00 00:00:00                 EASTON Mckeon     Method

i



                                                                        st

 

        2022 Telephone         Hoa, 1.2.840.1 [id number] 

868162 Methodi



        00:00:00 00:00:00                 Gianni 66059.1.1         493     st



                                                3.430.2.7                 Hospit

a



                                                .3.709567                 l



                                                .8                      

 

        2022 Orders          Keon,  1.2.840.1 [id number] 583296

6980 Methodi



        00:00:00 00:00:00 Only            Adele 46255.1.1         961     st



                                                3.430.2.7                 Hospit

a



                                                .3.439498                 l



                                                .8                      

 

        2022 Office          Michaelle, 1.2.840.1 [id number] 32795

39389 Methodi



        08:30:00 11:22:08 Visit           Adam SOLANO 78293.1.1         878     st



                                                3.430.2.7                 Hospit

a



                                                .3.968844                 l



                                                .8                      

 

        2022 Travel                  1.2.840.1 1.2.451.373 8035

683799 Methodi



        00:00:00 00:00:00                         72120.1.1 350.1.13.43 330     

st



                                                3.430.2.7 0.2.7.3.698         Ho

spita



                                                .3.715380 084.8           l



                                                .8                      

 

        2022 Outpatient                 [facility]     [id number]

[address] [place] [address]



        00:00:00 00:00:00                                         878     Method

i



                                                                        st

 

        2022-10-17 2022-10-17 Outpatient         FOG_Monla_Y AOSM    AOSM    572

2185- Reena



        00:00:00 00:00:00                 Jason                 [id number]  Orthop

e



                                                                        dic



                                                                        Sports



                                                                        Medicin



                                                                        e

 

        2022-10-17 2022-10-17 Pravin Y                 AOSM    [place] [facility] [place] 2022

7 [place]ena



        00:00:00 00:00:00 MD Keon:                         Anita dumont



                        [address]                         FOG_Ofc         dic



                        [facility]         Spor

[facility]                                              e



                        [address]                                         



                        , Ph.                                           



                        [phone]                                         

 

        2022-10-13 2022-10-13 Outpatient         FOG_Monla_Y AOSM    AOSM    572

2185- Reena



        00:00:00 00:00:00                 Jason                 [id number]  Orthop

e



                                                                        dic



                                                                        Sports



                                                                        Medicin



                                                                        e

 

        2022-10-12 2022-10-12 Outpatient         FOG_Monla_Y AOSM    AOSM    572

2185- Reena



        00:00:00 00:00:00                 Jason                 [id number]  Orthop

e



                                                                        dic



                                                                        Sports



                                                                        Medicin



                                                                        e

 

        2022-10-04 2022-10-04 Outpatient EL      Pravin Herbert [facility]   SURG    Y000

[id number] [facility]



        05:49:00 05:49:00                                         45      [place]



                                                                        Orthope



                                                                        dic



                                                                        Hospita



                                                                        l

 

        2022-10-04 2022-10-04 Outpatient         FOG_Monla_Y AOSM    AOSM    572

2185-20 Reena



        00:00:00 00:00:00                 Jason                 [id number]  Orthop

e



                                                                        dic



                                                                        Sports



                                                                        Medicin



                                                                        e

 

        2022-10-04 2022-10-04 Pravin Y                 [place]OSM    [place] [id number]

4 [place]ena



        00:00:00 00:00:00 MD Keon:                         Anita dumont



                        [address]                         FOG_Surgery         di

c



                        St[facility]                                             [facility]



                        [place]                                              e



                        [address]                                         



                        , Ph.                                           



                        [phone]                                         

 

        2022 Outpatient         FOG_Monla_Y AOSM    AOSM    572

2185-20 Reena



        00:00:00 00:00:00                 Jason                 [id number]  Orthop

e



                                                                        dic



                                                                        Sports



                                                                        Medicin



                                                                        e

 

        2022 Telephone         Helen,   1.2.840.1 [id number] 

550061 Methodi



        00:00:00 00:00:00                 Fady MERIDA 22949.1.1         826     st



                                                3.430.2.7                 Hospit

a



                                                .3.605350                 l



                                                .8                      

 

        2022 Telephone         Helen,   1.2.840.1 [id number] [id number] Methodi



        00:00:00 00:00:00                 Fady MERIDA 56186.1.1         826     st



                                                3.430.2.7                 Hospit

a



                                                .3.856724                 l



                                                .8                      

 

        2022 Outpatient         FOG_Monla_Y AOSM    AO    572

5 [place]ena



        00:00:00 00:00:00                 Jason                 [id number]  Orthop

e



                                                                        dic



                                                                        Sports



                                                                        Medicin



                                                                        e

 

        2022 Refheath Bullock, 1.2.840.1 [id number] 

[id number] Methodi



        00:00:00 00:00:00                 Liliya  37188.1.1         451     st



                                                3.430.2.7                 Hospit

a



                                                .3.367854                 l



                                                .8                      

 

        2022 Kamlesh Bullock 1.2.840.1 [id number] 210

[id number] Methodi



        00:00:00 00:00:00                 Liliya  59293.1.1         451     st



                                                3.430.2.7                 Hospit

a



                                                .3.824639                 l



                                                .8                      

 

        2022 Outpatient         FOG_Monla_Y AOSM    AOSM    572

5-20 Reena



        00:00:00 00:00:00                 Jason                 [id number]  Orthop

e



                                                                        dic



                                                                        Sports



                                                                        Medicin



                                                                        e

 

        2022 Pravin Y                 AOSM    [place] 2022

9 [place]ena



        00:00:00 00:00:00 MD Keon:                         Anita LAIRD

rthope



                        [address]                         FOG_Ofc         dic



                        St,                             [facility]         Spor

[facility],                                         Medicin



                        [place]                                              e



                        [address]                                         



                        , Ph.                                           



                        [phone]                                         

 

        2022 Outpatient         FOG_Monla_Y AOSM    AOSM    572

2185-20 Reena



        00:00:00 00:00:00                 Jason                 [id number]  Orthop

e



                                                                        dic



                                                                        Sports



                                                                        Medicin



                                                                        e

 

        2022 Telephone         Keon,  1.2.840.1 [id number] 

431189 Methodi



        00:00:00 00:00:00                 Adele 32365.1.1         950     st



                                                3.430.2.7                 Hospit

a



                                                .3.343533                 l



                                                .8                      

 

        2022 Telephone         Keon,  1.2.840.1 [id number] 

691389 Methodi



        00:00:00 00:00:00                 Adele 31112.1.1         950     st



                                                3.430.2.7                 Hospit

a



                                                .3.216050                 l



                                                .8                      

 

        2022 Outpatient         FOG_Monla_Y AOSM    AOSM    572

2185- Reena



        00:00:00 00:00:00                 Jason                 [id number]  Orthop

e



                                                                        dic



                                                                        Sports



                                                                        Medicin



                                                                        e

 

        2022 Outpatient         JESSICA Manning    AOSM    [id number]

4-2 



        00:00:00 00:00:00                 Oseas TIRADO                 [id number]-11ed-a 



                                                                [id number] 



                                                                [id number]  

 

        2022 Outpatient         Keon Pravin AOSM    AOSM    6006

86a4-2 



        00:00:00 00:00:00                 Y                       497-11ed-b 



                                                                [id number] 



                                                                [id number]  

 

        2022 Oseas TIRADO                 AOSM    [place] [facility] [place] 11144

825 [address]



        00:00:00 00:00:00 Anita Manning MD: 7401                         FOG_Ofc         dic



                        [facility],                                         Medicin



                        [place]                                              e



                        [address]                                         



                        , Ph.                                           



                        [phone]                                         

 

        2022 Outpatient         FOG_Monla_Y AOSM    AOSM    572

2185-20 Reena



        00:00:00 00:00:00                 Jason                 [id number]  Orthop

e



                                                                        dic



                                                                        Sports



                                                                        Medicin



                                                                        e

 

        2022 Outpatient         FOG_Monla_Y AOSM    AOSM    572

2185-20 Reena



        00:00:00 00:00:00                 Jason                 [id number]  Orthop

e



                                                                        dic



                                                                        Sports



                                                                        Medicin



                                                                        e

 

        2022 Outpatient         FOG_Monla_Y AOSM    AOSM    572

2185-20 Reena



        01:51:00 01:51:00                 Jason                 [id number]  Orthop

e



                                                                        dic



                                                                        Sports



                                                                        Medicin



                                                                        e

 

        2022 Outpatient         Monla,  AOSM    AOSM    [id number]

e-1 



        00:00:00 00:00:00                 Yomna T                 [id number] 



                                                                [id number] 



                                                                [id number]  

 

        2022 Outpatient         Monla,  AOSM    AOSM    [id number]

4-2 



        00:00:00 00:00:00                 Yomna T                 0be-11ed-8 



                                                                [id number] 



                                                                [id number]  

 

        2022 Yomna T                 AOSM    [place] [facility] [place] [id number]

6 [place]ena



        00:00:00 00:00:00 MD Manolo:                         Anita Charles -        

 Orthope



                        [address]                         FOG_Ofc         dic



                        [facility]         Spor

[facility],                                         Medicin



                        [place]                                              e



                        [address]                                         



                        , Ph.                                           



                        [phone]                                         

 

        2022 Telephone         Marian, 1.2.840.1 [id number] 210

[id number] Methodi



        00:00:00 00:00:00                 Karly 45656.1.1         085     st



                                                3.430.2.7                 Hospit

a



                                                .3.053114                 l



                                                .8                      

 

        2022 Telephone         Marian, 1.2.840.1 [id number] 210

[id number] Methodi



        00:00:00 00:00:00                 Karly 92565.1.1         085     st



                                                3.430.2.7                 Hospit

a



                                                .3.248219                 l



                                                .8                      

 

        2022 Kamlesh Harris, 1.2.840.1 [id number] 210

[id number] Methodi



        00:00:00 00:00:00                 Alize   57583.1.1         011     st



                                                3.430.2.7                 Hospit

a



                                                .3.161661                 l



                                                .8                      

 

        2022 Refill          Kimberly, 1.2.840.1 [id number] 

[id number] Methodi



        00:00:00 00:00:00                 Alize   35985.1.1         011     st



                                                3.430.2.7                 Hospit

a



                                                .3.004890                 l



                                                .8                      

 

        2022 Refill          Kimberly, 1.2.840.1 [id number] 210

[id number] Methodi



        00:00:00 00:00:00                 Alize   89086.1.1         782     st



                                                3.430.2.7                 Hospit

a



                                                .3.089831                 l



                                                .8                      

 

        2022 Refheath Harris, 1.2.840.1 [id number] 210

[id number] Methodi



        00:00:00 00:00:00                 Alize   63826.1.1         782     st



                                                3.430.2.7                 Hospit

a



                                                .3.025330                 l



                                                .8                      

 

        2022 Refill          Kimberly, 1.2.840.1 [id number] 210

[id number] Methodi



        00:00:00 00:00:00                 Alize   17567.1.1         981     st



                                                3.430.2.7                 Hospit

a



                                                .3.712840                 l



                                                .8                      

 

        2022 Sravani Urias,   1.2.840.1 [id number] 2100

[id number] Methodi



        00:00:00 00:00:00                 Fady R. 50912.1.1         343     st



                                                3.430.2.7                 Hospit

a



                                                .3.444943                 l



                                                .8                      

 

        2022 Refheath Harris, 1.2.840.1 [id number] 

[id number] Methodi



        00:00:00 00:00:00                 Alize   80148.1.1         981     st



                                                3.430.2.7                 Hospit

a



                                                .3.962820                 l



                                                .8                      

 

        2022 Telephone         Helen,   1.2.840.1 [id number] 2100

[id number] Methodi



        00:00:00 00:00:00                 Fady MERIDA 83798.1.1         343     st



                                                3.430.2.7                 Hospit

a



                                                .3.221444                 l



                                                .8                      

 

        2022 Refill          Kimberly, 1.2.840.1 [id number] 

[id number] Methodi



        00:00:00 00:00:00                 Alize   25612.1.1         560     st



                                                3.430.2.7                 Hospit

a



                                                .3.671031                 l



                                                .8                      

 

        2022 Refill          Kimberly, 1.2.840.1 [id number] 

[id number] Methodi



        00:00:00 00:00:00                 Alize   88340.1.1         560     st



                                                3.430.2.7                 Hospit

a



                                                .3.475144                 l



                                                .8                      

 

        2022 Orders          Harvey Mathias 1.2.840.1 [id number] 2100

[id number] Methodi



        00:00:00 00:00:00 Only                    69060.1.1         838     st



                                                3.430.2.7                 Hospit

a



                                                .3.665133                 l



                                                .8                      

 

        2022 Orders          Harvey Mathias 1.2.840.1 [id number] 

486778 Methodi



        00:00:00 00:00:00 Only                    93629.1.1         838     st



                                                3.430.2.7                 Hospit

a



                                                .3.912584                 l



                                                .8                      

 

        2022 Telephone         Helen,   1.2.840.1 [id number] 2100

809412 Methodi



        00:00:00 00:00:00                 Fady MERIDA 26003.1.1         453     st



                                                3.430.2.7                 Hospit

a



                                                .3.962846                 l



                                                .8                      

 

        2022 Telephone         Kobe, 1.2.840.1 [id number] 2

[id number] 

Methodi



        00:00:00 00:00:00                 Liliya  58798.1.1         741     st



                                                3.430.2.7                 Hospit

a



                                                .3.223507                 l



                                                .8                      

 

        2022-06-15 2022-06-15 Office          Kobe, 1.2.840.1 [id number] 210

[id number] Methodi



        15:00:00 15:00:00 Visit           Liliya  63518.1.1         652     st



                                                3.430.2.7                 Hospit

a



                                                .3.330062                 l



                                                .8                      

 

        2022-06-15 2022-06-15 Travel                  1.2.840.1 1.2.123.905 6009

773921 Methodi



        00:00:00 00:00:00                         53197.1.1 350.1.13.43 636     

st



                                                3.430.2.7 0.2.7.3.698         Ho

spita



                                                .3.378345 084.8           l



                                                .8                      

 

        2022 Outpatient         FOG_Monla_Y AOSM    AOSM    572

2185-20 Reena



        08:38:00 08:38:00                 omnRanda                 [id number]  Orthop

e



                                                                        dic



                                                                        Sports



                                                                        Medicin



                                                                        e

 

        2022 Outpatient         FOG_Monla_Y AOSM    AOSM    572

2185-20 Reena



        08:38:00 08:38:00                 omnRanda                 [id number]  Orthop

e



                                                                        dic



                                                                        Sports



                                                                        Medicin



                                                                        e

 

        2022 Refill          Kobe, 1.2.840.1 [id number] 210

[id number] Methodi



        00:00:00 00:00:00                 Liliya  73940.1.1         537     st



                                                3.430.2.7                 Hospit

a



                                                .3.551690                 l



                                                .8                      

 

        2022 Office          Keyanna, 1.2.840.9 [id number] 51936

94713 Methodi



        11:15:00 11:43:03 Visit           Easton Babb 92136.1.1         499     s

t



                                                3.430.2.7                 Hospit

a



                                                .3.712389                 l



                                                .8                      

 

        2022 Telephone         Keon,  1.2.840.1 [id number] 2100

[id number] Methodi



        00:00:00 00:00:00                 Adele 07645.1.1         797     st



                                                3.430.2.7                 Hospit

a



                                                .3.065504                 l



                                                .8                      

 

        2022 Travel                  1.2.840.1 1.2.656.431 2445

787797 Methodi



        00:00:00 00:00:00                         94971.1.1 350.1.13.43 870     

st



                                                3.430.2.7 0.2.7.3.698         Ho

spita



                                                .3.961918 084.8           l



                                                .8                      

 

        2022 Telephone         Kobe, 1.2.840.1 [id number] 2

[id number] 

Methodi



        00:00:00 00:00:00                 Liliya  16973.1.1         726     st



                                                3.430.2.7                 Hospit

a



                                                .3.294536                 l



                                                .8                      

 

        2022 Telephone         Go, 1.2.840.0 [id number] 2

[id number] 

Methodi



        00:00:00 00:00:00                 Felicita CARDOSO 36524.1.1         057     st



                                                3.430.2.7                 Hospit

a



                                                .3.848815                 l



                                                .8                      

 

        2022 Office          Keyanna, 1.2.840.4 [id number] 88798

70297 Methodi



        11:15:00 13:51:18 Visit           Easton Babb 61488.1.1         286     s

t



                                                3.430.2.7                 Hospit

a



                                                .3.519673                 l



                                                .8                      

 

        2022 Office          Helen,   1.2.840.1 [id number] 221285

2869 Methodi



        13:15:00 16:52:48 Visit           Fady MERIDA 06992.1.1         692     st



                                                3.430.2.7                 Hospit

a



                                                .3.368462                 l



                                                .8                      

 

        2022 Travel                  1.2.840.1 1.2.835.178 3554

242962 Methodi



        00:00:00 00:00:00                         83965.1.1 350.1.13.43 870     

st



                                                3.430.2.7 0.2.7.3.698         Ho

kindra



                                                .3.897842 084.8           l



                                                .8                      

 

        2022 Office          Keyanna, 1.2.840.2 [id number] 55963

14921 Methodi



        12:30:00 08:40:11 Visit           Easton Babb 24194.1.1         355     s

t



                                                3.430.2.7                 Hospit

a



                                                .3.351651                 l



                                                .8                      

 

        2022 Refill          Helen,   1.2.840.1 [id number] 648229

6221 Methodi



        00:00:00 00:00:00                 Fady THOMASJanelle 71341.1.1         230     st



                                                3.430.2.7                 Hospit

a



                                                .3.703154                 l



                                                .8                      

 

        2022 Orders          Yana, 1.2.840.9 [id number] 

[id number] Methodi



        00:00:00 00:00:00 Only            Tierra CHILO 12140.1.1         449     st



                                                3.430.2.7                 Hospit

a



                                                .3.169224                 l



                                                .8                      

 

        2021 Orders          Keyanna, 1.2.840.1 [id number] 604154

4965 Methodi



        00:00:00 00:00:00 Only            Easton Babb 45064.1.1         692     s

t



                                                3.430.2.7                 Hospit

a



                                                .3.244799                 l



                                                .8                      

 

        2021 Orders          Keyanna, 1.2.840.1 [id number] 2100

4965 Methodi



        00:00:00 00:00:00 Only            Easton Babb 75436.1.1         651     s

t



                                                3.430.2.7                 Hospit

a



                                                .3.005096                 l



                                                .8                      

 

        2021 Refill          Yana, 1.2.840.3 [id number] 

[id number] Methodi



        00:00:00 00:00:00                 Tierra E 56695.1.1         217     st



                                                3.430.2.7                 Hospit

a



                                                .3.226996                 l



                                                .8                      

 

        2021-10-29 2021-10-29 Refill          Keyanna, 1.2.840.1 [id number] 

43049 Methodi



        00:00:00 00:00:00                 Easton Babb 33518.1.1         105     s

t



                                                3.430.2.7                 Hospit

a



                                                .3.925139                 l



                                                .8                      

 

        2021-10-07 2021-10-07 Orders          Keon,  1.2.840.1 [id number]

0256 Methodi



        00:00:00 00:00:00 Only            Adele 30005.1.1         807     st



                                                3.430.2.7                 Hospit

a



                                                .3.569972                 l



                                                .8                      

 

        2021 Outpatient         KOBE, [facility]     021     

[id number] Lee



        00:00:00 00:00:00                 LILIYA                  888     Method

i



                                                                        

 

        2021 Outpatient         KOBE, [facility]     2100

[id number] Lee



        00:00:00 00:00:00                 LILIYA                  509     Method

i



                                                                        

 

        2021 Outpatient         KARI,  [facility]     [id number]

376 Lee



        00:00:00 00:00:00                 NADIM                   615     Method

i



                                                                        

 

        2021 Outpatient         HEATH, [facility]     [id number]

047 Lee



        00:00:00 00:00:00                 EASTON                   891     Method

i



                                                                        

 

        2021 Outpatient         KOBE, [facility]     2100

[id number] Lee



        00:00:00 00:00:00                 LILIYA                  152     Method

i



                                                                        

 

        2021 Outpatient         HELEN,   [facility]     [id number]

348 Lee



        00:00:00 00:00:00                 FADY                  464     Method

i



                                                                        

 

        2020-10-27 2020-10-27 Outpatient         KOBE, [facility]     2100

[id number] Lee



        00:00:00 00:00:00                 LILIYA                  710     Method

i



                                                                        

 

        2020 Outpatient         KOBE, [facility]     2100

[id number] Lee



        00:00:00 00:00:00                 LILIYA                  364     Method

i



                                                                        

 

        2020 Outpatient         KOBE, [facility]     2100

[id number] Lee



        00:00:00 00:00:00                 LILIYA                  294     Method

i



                                                                        

 

        2020 Outpatient         DARIELA ONTIVEROS [facility]     021     11173

[id number] Lee



        00:00:00 00:00:00                                         810     Method

i



                                                                        st

 

        2020 Outpatient         KOBE [facility]     021     2100

[id number] [place]



        00:00:00 00:00:00                 LILIYA                  270     Method

i



                                                                        st

 

        2019 Outpatient         KOBE [facility]     2100

[id number] Lee



        00:00:00 00:00:00                 LILIYA                  666     Method

i



                                                                        st

 

        2019 Outpatient JOSE Kent   3DAY    [id number]

201 [facility]



        15:48:00 23:00:00                 Toan                 [id number]   [place]



                                                                        [facility]th[facility]



                                                                        dic



                                                                        Hospita



                                                                        

 

        2016-06-15 2016-06-15 Outpatient         KARI  [facility]     021     [id number]

[id number] [place]



        00:00:00 00:00:00                 NADIM                   209     Method

i



                                                                        st







Results







           Test Description Test Time  Test Comments Results    Result Comments 

Source









                    POC creatinine      2023 20:42:00 









                      Test Item  Value      Reference Range Interpretation Comme

nts









             POC creatinine (test code = 1.3 mg/dl    0.5-0.9      H            

 Name: Sudeep Edgar



             61903-8)                                            EmmarieDevice I

D: [id number]

 

             Lab Interpretation (test code = Abnormal                           

    



             51149-8)                                            



[id number]





             Test Item    Value        Reference Range Interpretation Comments

 

             Estimated GFR (test 41           mL/min/1.73 m2 WES loyd Units



             code = 60528-2)                                        Interpretati

onG1 >=90



                                                                 Normal or highG

2 60-89



                                                                 Mildly decrease

dG3a



                                                                 45-59 Mildly to



                                                                 moderately decr

farlmJ0r



                                                                 30-44 Moderatel

y to



                                                                 severely decrea

sedG4



                                                                 15-29 Severely



                                                                 decreasedG5 <15

 Kidney



                                                                 failureThe eGFR

 was



                                                                 calculated usin

g the



                                                                 Chronic Kidney 

Disease



                                                                 Epidemiology



                                                                 Collaboration (

CKD-EPI)



                                                                 equation.



                                                                 Interpretation 

is based



                                                                 on recommendati

ons of



                                                                 the National Ki

dney



                                                                 Foundation-Kidn

ey



                                                                 Disease Outcome

s



                                                                 Quality Initiat

juan



                                                                 (NKF-KDOQI) pub

lished



                                                                 in .

 

             Lab Interpretation Abnormal                               



             (test code = 29105-9)                                        



[id number] 22:18:00





             Test Item    Value        Reference Range Interpretation Comments

 

             Glucose (test code = 150 mg/dL    65-99        H             Fastin

g



             2345-7)                                             reference



                                                                 interval For



                                                                 someone without



                                                                 known diabetes,

 a



                                                                 glucosevalue >1

25



                                                                 mg/dL indicates



                                                                 that they may



                                                                 havediabetes an

d



                                                                 this should be



                                                                 confirmed with



                                                                 afollow-up test

.

 

             BUN (test code = 28 mg/dL     7-25         H            



             3094-0)                                             

 

             Creatinine (test 1.04 mg/dL   0.60-1.00    H            



             code = 2160-0)                                        

 

             eGFR (test code = 57           See_Comment  L            The eGFR i

s based



             95231-5)                                            on the CKD-EPI



                                                                  equation. 

To



                                                                 calculate the n

ew



                                                                 eGFR from a



                                                                 previous



                                                                 Creatinine or



                                                                 Cystatin Cresul

t,



                                                                 go to



                                                                 https://[email or web address]

ne



                                                                 y.org/profliseio

[email or web address] estrella/kdoqi/gfr%5F

ca



                                                                 lculator



                                                                 [Automated



                                                                 message] The



                                                                 system which



                                                                 generated this



                                                                 result



                                                                 transmitted



                                                                 reference range

:



                                                                 > OR = 60



                                                                 mL/min/1.73m2.



                                                                 The reference



                                                                 range was not



                                                                 used to interpr

et



                                                                 this result as



                                                                 normal/abnormal

.

 

             BUN/creatinine ratio 27           See_Comment  H             [Autom

ated



             (test code = 3097-3)                                        message

] The



                                                                 system which



                                                                 generated this



                                                                 result



                                                                 transmitted



                                                                 reference range

:



                                                                 6 - 22 (calc).



                                                                 The reference



                                                                 range was not



                                                                 used to interpr

et



                                                                 this result as



                                                                 normal/abnormal

.

 

             Sodium (test code = 142 mmol/L   135-146                   



             2951-2)                                             

 

             Potassium (test code 3.9 mmol/L   3.5-5.3                   



             = 2823-3)                                           

 

             Chloride (test code 105 mmol/L                       



             = 2075-0)                                           

 

             CO2 (test code = 31 mmol/L    20-32                     



             -9)                                             

 

             Calcium (test code = 10.8 mg/dL   8.6-10.4     H            



             04470-6)                                            

 

             Protein (test code = 6.4 g/dL     6.1-8.1                   



             2885-2)                                             

 

             Albumin, S (test 4.0 g/dL     3.6-5.1                   



             code = 1751-7)                                        

 

             Globulin, total 2.4          See_Comment                [Automated



             (test code =                                        message] The



             77237-0)                                            system which



                                                                 generated this



                                                                 result



                                                                 transmitted



                                                                 reference range

:



                                                                 1.9 - 3.7 g/dL



                                                                 (calc). The



                                                                 reference range



                                                                 was not used to



                                                                 interpret this



                                                                 result as



                                                                 normal/abnormal

.

 

             Albumin/globulin 1.7          See_Comment                [Automated



             ratio (test code =                                        message] 

The



             1759-0)                                             system which



                                                                 generated this



                                                                 result



                                                                 transmitted



                                                                 reference range

:



                                                                 1.0 - 2.5 (calc

).



                                                                 The reference



                                                                 range was not



                                                                 used to interpr

et



                                                                 this result as



                                                                 normal/abnormal

.

 

             Total bilirubin 0.5 mg/dL    0.2-1.2                   



             (test code = -2)                                        

 

             Alkaline phosphatase 92 U/L                           



             (test code = 6768-6)                                        

 

             AST (test code = 59 U/L       10-35        H            



             1920-8)                                             

 

             ALT (test code = 24 U/L       6-29                      



             1742-6)                                             

 

             KATIE (test code = FASTING:YES                            



             KATIE)         FASTING: YES                           

 

             RAC (test code = Performing                             



             RAC)         Organization                           



                          Information: Site                           



                          ID: [facility] Name:                           



                          [facility]                           



                          n Lab Address:                           



                          [address] [place] [address]                           



                          [id number]                             



                          Director: Satnam Lopez                           

 

             Lab Interpretation Abnormal                               



             (test code =                                        



             54485-1)                                            



[id number]





             Test Item    Value        Reference    Interpretation Comments



                                       Range                     

 

             PTH (test    69 pg/mL     16-77                      Interpretive G

uide Intact



             code =                                              PTH Calcium----

--------------



             2731-8)                                             ---------- ----

---Normal



                                                                 Parathyroid Nor

mal



                                                                 NormalHypoparat

hyroidism Low



                                                                 or Low Normal



                                                                 LowHyperparathy

roidism



                                                                 Primary Normal 

or High High



                                                                 Secondary High 

Normal or Low



                                                                 Tertiary High



                                                                 HighNon-Parathy

roid



                                                                 Hypercalcemia L

ow or Low



                                                                 Normal High

 

             KATIE (test    FASTING:YES                            



             code =       FASTING: YES                           



             KATIE)                                                

 

             RAC (test    Performing                             



             code =       Organization                           



             RAC)         Information: Site                           



                          ID: A Name:                           



                          [facility]                           



                          on Lab Address:                           



                          [address] [place] [address]                           



                          [id number]                             



                          Director: Satnam Lopez                           



[facility] [id number] 22:18:00





             Test Item    Value        Reference Range Interpretation Comments

 

             Phosphorus (test code 3.6 mg/dL    2.1-4.3                   



             = 2777-1)                                           

 

             KATIE (test code = KATIE) FASTING:YES FASTING:                         

  



                          YES                                    

 

             RAC (test code = RAC) Performing Organization                      

     



                          Information: Site ID:                           



                          [facility] Name: [facility]                           



                          Address: [address] [place] [address]                           



                          [id number] Director:                           



                          [facility] with [id number] 22:18:00





             Test Item    Value        Reference Range Interpretation Comments

 

             Color, UA (test code YELLOW       YELLOW                    



             = 5778-6)                                           

 

             Appearance (test CLOUDY       CLEAR        A            



             code = 5767-9)                                        

 

             Specific gravity, 1.012        1.001-1.035               



             urine (test code =                                        



             5811-5)                                             

 

             pH, urine (test code 5.5          5.0-8.0                   



             = 5803-2)                                           

 

             Glucose, urine (test NEGATIVE     NEGATIVE                  



             code = 75818-3)                                        

 

             Bilirubin, UA (test NEGATIVE     NEGATIVE                  



             code = 5770-3)                                        

 

             Ketones, UA (test NEGATIVE     NEGATIVE                  



             code = 2514-8)                                        

 

             Occult blood, urine 3+           NEGATIVE     A            



             (test code = 5794-3)                                        

 

             Protein, UA (test NEGATIVE     NEGATIVE                  



             code = 08913-6)                                        

 

             Nitrite, UA (test POSITIVE     NEGATIVE     A            



             code = 5802-4)                                        

 

             Leukocyte esterase, TRACE        NEGATIVE     A            



             UA (test code =                                        



             5799-2)                                             

 

             WBC, UA (test code = 0-5          See_Comment                [Autom

ated



             5821-4)                                             message] The



                                                                 system which



                                                                 generated this



                                                                 result



                                                                 transmitted



                                                                 reference range

:



                                                                 < OR = 5 /HPF.



                                                                 The reference



                                                                 range was not



                                                                 used to interpr

et



                                                                 this result as



                                                                 normal/abnormal

.

 

             RBC, UA (test code = 3-10         See_Comment  A             [Autom

ated



             14973-5)                                            message] The



                                                                 system which



                                                                 generated this



                                                                 result



                                                                 transmitted



                                                                 reference range

:



                                                                 < OR = 2 /HPF.



                                                                 The reference



                                                                 range was not



                                                                 used to interpr

et



                                                                 this result as



                                                                 normal/abnormal

.

 

             Squamous epithelial 0-5          See_Comment                [Automa

alex



             cells, UA (test code                                        message

] The



             = 52219-1)                                          system which



                                                                 generated this



                                                                 result



                                                                 transmitted



                                                                 reference range

:



                                                                 < OR = 5 /HPF.



                                                                 The reference



                                                                 range was not



                                                                 used to interpr

et



                                                                 this result as



                                                                 normal/abnormal

.

 

             Bacteria, UA (test MANY         NONE SEEN /HPF A            



             code = 5769-5)                                        

 

             Calcium oxalate FEW          NONE OR FEW               



             crystals, UA (test              /HPF                      



             code = 79837-5)                                        

 

             Hyaline casts, UA NONE SEEN    NONE SEEN /LPF              



             (test code = 5796-8)                                        

 

             Note: (test code =                                        This urin

e was



             8251-1)                                             analyzed for th

e



                                                                 presence of WBC

,



                                                                 RBC, bacteria,



                                                                 casts, and othe

r



                                                                 formed elements

.



                                                                 Only those



                                                                 elements seen



                                                                 were reported.

 

             KATIE (test code = FASTING:YES                            



             KATIE)         FASTING: YES                           

 

             RAC (test code = Performing                             



             RAC)         Organization                           



                          Information: Site                           



                          ID: [facility] Name:                           



                          [facility] Lab Address:                           



                          [address] [place] [address]                             



                          Director: Satnam Lopez                           

 

             Lab Interpretation Abnormal                               



             (test code =                                        



             03091-7)                                            



[facility] Hospital[facility]inine [facility], urine, [id number] 22:18:00





             Test Item    Value        Reference Range Interpretation Comments

 

             Creatinine, urine 38 mg/dL                         



             (mg/dL) (test code                                        



             = 2161-8)                                           

 

             KATIE (test code = FASTING:YES FASTING: YES                          

 



             KATIE)                                                

 

             RAC (test code = Performing Organization                           



             RAC)         Information: Site ID: [facility]                           



                          Name: [facility]                            



                          [facility]                           



                          Address: [address] [place] [address] Director:                           



                          Satnam Lopez                           



[facility] Hospital[facility], [facility], [id number] 22:18:00





             Test Item    Value        Reference Range Interpretation Comments

 

             Protein, urine 9 mg/dL      5-24                      



             random (test code =                                        



             2888-6)                                             

 

             KATIE (test code = FASTING:YES FASTING: YES                          

 



             KATIE)                                                

 

             RAC (test code = Performing Organization                           



             RAC)         Information: Site ID: [facility]                           



                          Name: [facility]                            



                          [facility]                           



                          Address: [address] [place] [address] Director:                           



                          Satnam Lopez                           



[facility] [id number] 19:41:28





             Test Item    Value        Reference Range Interpretation Comments

 

             Case number (test code = [id number]                           



             [id number])                                            

 

             Surgical pathology See link below for                           



             report (test code = PDF Lab Report                           



             2255)                                               

 

             Result status (test code This is Final Report                      

     



             = [id number])   for [id number]                           



[facility] [id number] 19:27:00





             Test Item    Value        Reference Range Interpretation Comments

 

             POC glucose (test code 150 mg/dL    65-99        H            Opera

evelyn Name: Carlos



             = 47754-2)                                          ClaudetteDevice

 ID:



                                                                 [id number]

able:



                                                                 [facility] Notified RN

 

             Lab Interpretation Abnormal                               



             (test code = 35188-3)                                        



[facility] HospitalCOVID[facility] qualitative [id number] 00:30:27





             Test Item    Value        Reference    Interpretation Comments



                                       Range                     

 

             Interpretation Negative results do                           



             (test code = not preclude COVID-19                           



             6226015)     infection and should                           



                          not be used asthe sole                           



                          basis for treatment or                           



                          other patient                           



                          management decisions.                           



                          Negativeresults must                           



                          be combined with                           



                          clinical observations,                           



                          patient history,                           



                          andepidemiological                           



                          information.                           

 

             COVID-19     Not-Detected Not-Detected              DISCLAIMER:This



             qualitative RT-PCR                                        test was



             result (test code                                        performed 

using



             = 18558-4)                                          the [employer]



                                                                 SARS-CoV-2 [employer]

maricel



                                                                 ([employer]



                                                                 This assay is



                                                                 available for i

n



                                                                 vitro diagnosti

c



                                                                 use under Food



                                                                 and Drug



                                                                 Administration



                                                                 (FDA) Emergency



                                                                 Use Authorizati

on



                                                                 (EUA) and has



                                                                 been verified f

or



                                                                 clinical use by



                                                                 the [facility]



                                                                 Molecular



                                                                 Diagnostics



                                                                 Laboratory.



                                                                 Information on



                                                                 the FDA policy



                                                                 for diagnostic



                                                                 tests for



                                                                 coronavirus



                                                                 disease- 2019 i

s



                                                                 available



                                                                 at:https://www.

fd



                                                                 a.gov/medical-d

ev



                                                                 ices/emergency-

si



                                                                 tuations-medica

l-



                                                                 devices/faqs-di

ag



                                                                 nostic-testing-

sa



                                                                 rs-cov-2It is



                                                                 critical that



                                                                 health care



                                                                 providers and



                                                                 patients review



                                                                 the applicable



                                                                 fact sheet(s) i

n



                                                                 interpreting or



                                                                 understanding t

he



                                                                 test results th

at



                                                                 are available



                                                                 upon



                                                                 request.METHODO

LO



                                                                 GY:This assay



                                                                 utilizes reagen

ts



                                                                 for nucleic aci

d



                                                                 extraction,



                                                                 amplification,



                                                                 and real-time



                                                                 reverse



                                                                 transcription



                                                                 polymerase lisandra

n



                                                                 reaction.

 

             COVID-19     See link below for PDF                           Case 

Number:



             qualitative RT-PCR Lab Report                             [id number]

577



             PDF (test code =                                        



             7070)                                               



[facility]Presence] in Respiratory specimen 
by SHAKILA with probe [id number] 18:30:27





             Test Item    Value        Reference Range Interpretation Comments

 

             SARS-CoV-2 (COVID-19) RNA Not detected                           



             [Presence] in Respiratory                                        



             specimen by SHAKILA with probe                                        



             detection (test code = 57596-8)                                    

    

 

             Whether patient is employed in a Unknown                           

     



             healthcare setting (test code =                                    

    



             03232-4)                                            

 

             Whether the patient has symptoms Unknown                           

     



             related to condition of interest                                   

     



             (test code = 49815-8)                                        

 

             Whether the patient was Unknown                                



             hospitalized for condition of                                      

  



             interest (test code = 08863-4)                                     

   

 

             Whether the patient was admitted Unknown                           

     



             to intensive care unit (ICU) for                                   

     



             condition of interest (test code                                   

     



             = 76331-9)                                          

 

             Whether patient resides in a Unknown                               

 



             congregate care setting (test                                      

  



             code = 44147-7)                                        

 

             Pregnancy status (test code = Unknown                              

  



             59348-2)                                            

 

             Date and time of symptom onset Unknown                             

   



             (test code = 71356-1)                                        



[facility]2022-10-04 06:48:00





             Test Item    Value        Reference Range Interpretation Comments

 

             GLUBED (test code = GLUBED) 131 mg/dL           H            



[id number] 06:34:00





             Test Item    Value        Reference Range Interpretation Comments

 

             glubed (test code = glubed) 131 mg/dL           H            

 

             performing lab: (test code =                                       

 



             performing lab:)                                        



[facility]





             Test Item    Value        Reference Range Interpretation Comments

 

             glubed (test code = glubed) 131 mg/dL           H            

 

             performing lab: (test code =                                       

 



             performing lab:)                                        



[facility] [id number] 
18:56:00





             Test Item    Value        Reference Range Interpretation Comments

 

             SODIUM (test code = 144 mmol/L   136-145      N            



             NA)                                                 

 

             POTASSIUM (test code = 3.7 mmol/L   3.5-5.1      N            



             K)                                                  

 

             CHLORIDE (test code = 104.0 mmol/L        N            



             CL)                                                 

 

             CARBON DIOXIDE (test 27.5 mmol/L  21-32        N            



             code = CO2)                                         

 

             GLUCOSE (test code = 123 mg/dL           H            



             GLU)                                                

 

             BLOOD UREA NITROGEN 29 mg/dL     7-18         H            



             (test code = BUN)                                        

 

             GLOMERULAR FILTRATION 38.9         >60                       Unit o

f measure:



             RATE (test code = GFR)                                        mL/mi

n/1.73



                                                                 [id number]



                                                                 Range:Healthy



                                                                 Adults  >90



                                                                 mL/min/1.73 m2 

For



                                                                 Chronic Kidney



                                                                 Disease: Stage 

II



                                                                 Mild Decrease i

n



                                                                 GFR 60-90 Stage

 III



                                                                 Moderate Decrea

se



                                                                 in GFR 30-59 St

age



                                                                 IV Severe Decre

ase



                                                                 in GFR 15-29 St

age



                                                                 V Kidney Failur

e



                                                                 <15

 

             CREATININE (test code 1.34 mg/dL   0.55-1.30    H            



             = CREAT)                                            

 

             TOTAL PROTEIN (test 7.1 g/dL     6.4-8.2      N            



             code = PROT)                                        

 

             ALBUMIN (test code = 3.7 g/dL     3.4-5.0      N            



             ALB)                                                

 

             GLOBULIN (test code = 3.4 g/dL     2.2-4.2      N            



             GLOB)                                               

 

             ALBUMIN/GLOBULIN RATIO 1.1          0.7-2.0      N            



             (test code = A/G)                                        

 

             CALCIUM (test code = 9.4 mg/dL    8.2-10.1     N            



             CA)                                                 

 

             BILIRUBIN TOTAL (test 0.30 mg/dL   0.2-1.00     N            



             code = BILT)                                        

 

             SGOT/AST (test code = 75.0 U/L     15-37        H            



             AST)                                                

 

             SGPT/ALT (test code = 33.0 U/L     12-78        N            Please

 note new



             ALT)                                                normal range.

 

             ALKALINE PHOSPHATASE 86 U/L              N            



             TOTAL (test code =                                        



             ALKP)                                               



[facility] metabolic [id number] 19:50:00





             Test Item    Value        Reference    Interpretation Comments



                                       Range                     

 

             Glucose (test code 130 mg/dL    65-99        H             Fasting 

reference



             = 2345-7)                                           interval For so

meone



                                                                 without known



                                                                 diabetes, a



                                                                 glucosevalue >1

25



                                                                 mg/dL indicates

 that



                                                                 they may



                                                                 havediabetes an

d



                                                                 this should be



                                                                 confirmed with



                                                                 afollow-up test

.

 

             BUN (test code = 24 mg/dL                           



             3094-0)                                             

 

             Creatinine (test 1.25 mg/dL   0.6-0.93     H            For patient

s >49



             code = 2160-0)                                        years of age,

 the



                                                                 reference limit

for



                                                                 Creatinine is



                                                                 approximately 1

3%



                                                                 higher for



                                                                 peopleidentifie

d as



                                                                 -Celi

n.

 

             EGFR Non-Afr.              See_Comment  L             [Automated me

ssage]



             [employer] (test code                                        The syst

em which



             = 4835)                                             generated this



                                                                 result transmit

alex



                                                                 reference range

: >



                                                                 OR = 60



                                                                 mL/min/1.73m2. 

The



                                                                 reference range

 was



                                                                 not used to



                                                                 interpret this



                                                                 result as



                                                                 normal/abnormal

.

 

             EGFR               See_Comment  L             [Automated mes

sergei]



             [employer] (test code                                        The syst

em which



             = 2774)                                             generated this



                                                                 result transmit

alex



                                                                 reference range

: >



                                                                 OR = 60



                                                                 mL/min/1.73m2. 

The



                                                                 reference range

 was



                                                                 not used to



                                                                 interpret this



                                                                 result as



                                                                 normal/abnormal

.

 

             BUN/creatinine              See_Comment                [Automated m

essage]



             ratio (test code =                                        The syste

m which



             3097-3)                                             generated this



                                                                 result transmit

alex



                                                                 reference range

: 6 -



                                                                 22 (calc). The



                                                                 reference range

 was



                                                                 not used to



                                                                 interpret this



                                                                 result as



                                                                 normal/abnormal

.

 

             Sodium (test code = 140 mmol/L   135-146                   



             2951-2)                                             

 

             Potassium (test 4.1 mmol/L   3.5-5.3                   



             code = 2823-3)                                        

 

             Chloride (test code 108 mmol/L                       



             = 5-0)                                           

 

             CO2 (test code = 24 mmol/L    20-32                     



             -9)                                             

 

             Calcium (test code 9.6 mg/dL    8.6-10.4                  



             = 26349-7)                                          

 

             Protein (test code 6.1 g/dL     6.1-8.1                   



             = 2885-2)                                           

 

             Albumin, S (test 3.8 g/dL     3.6-5.1                   



             code = 1751-7)                                        

 

             Globulin, total              See_Comment                [Automated 

message]



             (test code =                                        The system whic

h



             63815-0)                                            generated this



                                                                 result transmit

alex



                                                                 reference range

: 1.9



                                                                 - 3.7 g/dL (tera

c).



                                                                 The reference r

marion



                                                                 was not used to



                                                                 interpret this



                                                                 result as



                                                                 normal/abnormal

.

 

             Albumin/globulin              See_Comment                [Automated

 message]



             ratio (test code =                                        The syste

m which



             -0)                                             generated this



                                                                 result transmit

alex



                                                                 reference range

: 1.0



                                                                 - 2.5 (calc). T

he



                                                                 reference range

 was



                                                                 not used to



                                                                 interpret this



                                                                 result as



                                                                 normal/abnormal

.

 

             Total bilirubin 0.4 mg/dL    0.2-1.2                   



             (test code =                                        



             1975-2)                                             

 

             Alkaline     59 U/L                           



             phosphatase (test                                        



             code = 6768-6)                                        

 

             AST (test code = 34 U/L       10-35                     



             1920-8)                                             

 

             ALT (test code = 15 U/L       -                      



             174-6)                                             

 

             RAC (test code = Performing                             



             RAC)         Organization                           



                          Information: Site                           



                          ID: MARKA Name:                           



                          [facility]                           



                          on Lab Address:                           



                          [address] [place] [address]                           



                          [id number]                             



                          Director: Satnam Lopez                           

 

             Lab Interpretation Abnormal                               



             (test code =                                        



             34502-4)                                            



[id number]00





             Test Item    Value        Reference    Interpretation Comments



                                       Range                     

 

             PTH (test    48 pg/mL     16-77                      Interpretive G

uide Intact



             code =                                              PTH Calcium----

--------------



             2731-8)                                             ---------- ----

---Normal



                                                                 Parathyroid  No

rmal



                                                                 NormalHypoparat

hyroidism Low



                                                                 or Low Normal



                                                                 LowHyperparathy

roidism



                                                                 Primary Normal 

or High High



                                                                 Secondary High 

Normal or Low



                                                                 Tertiary High



                                                                 HighNon-Parathy

roid



                                                                 Hypercalcemia L

ow or Low



                                                                 Normal High

 

             RAC (test    Performing                             



             code =       Organization                           



             RAC)         Information: Site                           



                          ID: MARKA Name:                           



                          [facility] Address:                           



                          [address] [place] [address]                           



                          [id number]                             



                          Director[facility] [id number] 19:50:00





             Test Item    Value        Reference Range Interpretation Comments

 

             Phosphorus (test code 3.2 mg/dL    2.1-4.3                   



             = 2777-1)                                           

 

             RAC (test code = RAC) Performing Organization                      

     



                          Information: Site ID:                           



                          [facility]GA Name: [facility]                           



                          Address: [address] [place] [address]                           



                          [phone] Director:                           



                          [facility] with platelet and [id number] 19:50:00





             Test Item    Value        Reference Range Interpretation Comments

 

             WBC (test code =              See_Comment                [Automated



             6690-2)                                             message] The



                                                                 system which



                                                                 generated this



                                                                 result



                                                                 transmitted



                                                                 reference range

:



                                                                 3.8 - 10.8



                                                                 Thousand/uL. Th

e



                                                                 reference range



                                                                 was not used to



                                                                 interpret this



                                                                 result as



                                                                 normal/abnormal

.

 

             RBC (test code =              See_Comment  L             [Automated



             769-8)                                              message] The



                                                                 system which



                                                                 generated this



                                                                 result



                                                                 transmitted



                                                                 reference range

:



                                                                 3.80 - 5.10



                                                                 Million/uL. The



                                                                 reference range



                                                                 was not used to



                                                                 interpret this



                                                                 result as



                                                                 normal/abnormal

.

 

             HGB (test code = 7.9 g/dL     11.7-15.5    L            



             718-7)                                              

 

             HCT (test code = 27.5 %       35-45        L            



             4544-3)                                             

 

             MCV (test code = 77.0 fL             L            



             787-2)                                              

 

             MCH (test code = 22.1 pg      27-33        L            



             785-6)                                              

 

             MCHC (test code = 28.7 g/dL    32-36        L            



             786-4)                                              

 

             RDW (test code = 15.5 %       11-15        H            



             788-0)                                              

 

             Platelet count (test              See_Comment                [Autom

ated



             code = 777-3)                                        message] The



                                                                 system which



                                                                 generated this



                                                                 result



                                                                 transmitted



                                                                 reference range

:



                                                                 140 - 400



                                                                 Thousand/uL. Th

e



                                                                 reference range



                                                                 was not used to



                                                                 interpret this



                                                                 result as



                                                                 normal/abnormal

.

 

             MPV (test code = 10.1 fL      7.5-12.5                  



             776-5)                                              

 

             Neutrophils,              See_Comment                [Automated



             absolute (test code                                        message]

 The



             = 751-8)                                            system which



                                                                 generated this



                                                                 result



                                                                 transmitted



                                                                 reference range

:



                                                                 1,500 - 7,800



                                                                 cells/uL. The



                                                                 reference range



                                                                 was not used to



                                                                 interpret this



                                                                 result as



                                                                 normal/abnormal

.

 

             Lymphocytes,              See_Comment                [Automated



             absolute (test code                                        message]

 The



             = 731-0)                                            system which



                                                                 generated this



                                                                 result



                                                                 transmitted



                                                                 reference range

:



                                                                 850 - 3,900



                                                                 cells/uL. The



                                                                 reference range



                                                                 was not used to



                                                                 interpret this



                                                                 result as



                                                                 normal/abnormal

.

 

             Monocytes, absolute              See_Comment                [Automa

alex



             (test code = 742-7)                                        message]

 The



                                                                 system which



                                                                 generated this



                                                                 result



                                                                 transmitted



                                                                 reference range

:



                                                                 200 - 950



                                                                 cells/uL. The



                                                                 reference range



                                                                 was not used to



                                                                 interpret this



                                                                 result as



                                                                 normal/abnormal

.

 

             Eosinophils,              See_Comment                [Automated



             absolute (test code                                        message]

 The



             = 711-2)                                            system which



                                                                 generated this



                                                                 result



                                                                 transmitted



                                                                 reference range

:



                                                                 15 - 500



                                                                 cells/uL. The



                                                                 reference range



                                                                 was not used to



                                                                 interpret this



                                                                 result as



                                                                 normal/abnormal

.

 

             Basophils, absolute              See_Comment                [Automa

alex



             (test code = 704-7)                                        message]

 The



                                                                 system which



                                                                 generated this



                                                                 result



                                                                 transmitted



                                                                 reference range

:



                                                                 0 - 200 cells/u

L.



                                                                 The reference



                                                                 range was not



                                                                 used to interpr

et



                                                                 this result as



                                                                 normal/abnormal

.

 

             Neutrophils (test 69.1 %                                 



             code = 770-8)                                        

 

             Lymphocytes (test 18.8 %                                 



             code = 736-9)                                        

 

             Monocytes (test code 8.6 %                                  



             = 5905-5)                                           

 

             Eosinophils (test 2.9 %                                  



             code = 713-8)                                        

 

             Basophils + RC (test 0.6 %                                  



             code = 706-2)                                        

 

             RAC (test code = Performing                             



             RAC)         Organization                           



                          Information: Site                           



                          ID: [facility] Name:                           



                          [facility] Address:                           



                          [address] [place] [address]                           



                          [address]                             



                          Director: Satnam Lopez                           

 

             Lab Interpretation Abnormal                               



             (test code =                                        



             03609-4)                                            



[facility] [id number] 19:50:00





             Test Item    Value        Reference Range Interpretation Comments

 

             Color, UA (test code YELLOW       YELLOW                    



             = 5778-6)                                           

 

             Appearance (test CLOUDY       CLEAR        A            



             code = 5767-9)                                        

 

             Specific gravity,              1.001-1.035               



             urine (test code =                                        



             5811-5)                                             

 

             pH, urine (test code              5-8                       



             = 5803-2)                                           

 

             Glucose, urine (test NEGATIVE     NEGATIVE                  



             code = 92507-4)                                        

 

             Bilirubin, UA (test NEGATIVE     NEGATIVE                  



             code = 5770-3)                                        

 

             Ketones, UA (test NEGATIVE     NEGATIVE                  



             code = 4404-8)                                        

 

             Occult blood, urine NEGATIVE     NEGATIVE                  



             (test code = 5794-3)                                        

 

             Protein, UA (test NEGATIVE     NEGATIVE                  



             code = 21613-0)                                        

 

             Nitrite, UA (test POSITIVE     NEGATIVE     A            



             code = 5802-4)                                        

 

             Leukocyte esterase, NEGATIVE     NEGATIVE                  



             UA (test code =                                        



             5799-2)                                             

 

             WBC, UA (test code = 6-10         See_Comment  A             [Autom

ated



             5821-4)                                             message] The



                                                                 system which



                                                                 generated this



                                                                 result



                                                                 transmitted



                                                                 reference range

:



                                                                 < OR = 5 /HPF.



                                                                 The reference



                                                                 range was not



                                                                 used to interpr

et



                                                                 this result as



                                                                 normal/abnormal

.

 

             RBC, UA (test code = 0-2          See_Comment                [Autom

ated



             72209-8)                                            message] The



                                                                 system which



                                                                 generated this



                                                                 result



                                                                 transmitted



                                                                 reference range

:



                                                                 < OR = 2 /HPF.



                                                                 The reference



                                                                 range was not



                                                                 used to interpr

et



                                                                 this result as



                                                                 normal/abnormal

.

 

             Squamous epithelial 10-20        See_Comment  A             [Automa

alex



             cells, UA (test code                                        message

] The



             = 14832-4)                                          system which



                                                                 generated this



                                                                 result



                                                                 transmitted



                                                                 reference range

:



                                                                 < OR = 5 /HPF.



                                                                 The reference



                                                                 range was not



                                                                 used to interpr

et



                                                                 this result as



                                                                 normal/abnormal

.

 

             Bacteria, UA (test MANY         NONE SEEN /HPF A            



             code = 5769-5)                                        

 

             Calcium oxalate MANY         NONE OR FEW  A            



             crystals, UA (test              /HPF                      



             code = 67499-7)                                        

 

             Hyaline casts, UA NONE SEEN    NONE SEEN /LPF              



             (test code = 5796-8)                                        

 

             Note: (test code =                                        This urin

e was



             8251-1)                                             analyzed for th

e



                                                                 presence of WBC

,



                                                                 RBC, bacteria,



                                                                 casts, and othe

r



                                                                 formed elements

.



                                                                 Only those



                                                                 elements seen



                                                                 were reported.

 

             RAC (test code = Performing                             



             RAC)         Organization                           



                          Information: Site                           



                          ID: [facility] Name:                           



                          [facility] Address:                           



                          [address] [place] [address]                           



                          [id number]                             



                          Director: Satnam Lopez                           

 

             Lab Interpretation Abnormal                               



             (test code =                                        



             22773-6)                                            



[facility], [id number] 19:50:00





             Test Item    Value        Reference Range Interpretation Comments

 

             Creatinine, urine 76 mg/dL                         



             (mg/dL) (test code                                        



             = 2161-8)                                           

 

             RAC (test code = Performing Organization                           



             RAC)         Information: Site ID: [facility]                           



                          [facility]                           



                          Address: [address] [place] [address]                           



                          [phone] Director:                           



                          Satnam Lopez                           



[facility], [id number] 19:50:00





             Test Item    Value        Reference Range Interpretation Comments

 

             Protein, urine 17 mg/dL     5-24                      



             random (test code =                                        



             2888-6)                                             

 

             RAC (test code = Performing Organization                           



             RAC)         Information: Site ID: [facility]                           



                          [facility]                           



                          Address: [address] [place] [address]                           



                          [phone] Director:                           



                          Satnam Lopez                           



[facility] 1996 panel - Serum or [id number] 00:00:00





             Test Item    Value        Reference Range Interpretation Comments

 

             Cholesterol  162 mg/dL    <200                      



             [Mass/volume] in                                        



             Serum or Plasma (test                                        



             code = 2093-3)                                        

 

             Cholesterol in HDL 38 mg/dL     See_Comment  L             [Automat

ed



             [Mass/volume] in                                        message] Th

e system



             Serum or Plasma (test                                        which 

generated



             code = 2085-9)                                        this result



                                                                 transmitted



                                                                 reference range

: >



                                                                 or = 50. The



                                                                 reference range

 was



                                                                 not used to



                                                                 interpret this



                                                                 result as



                                                                 normal/abnormal

.

 

             Triglyceride 209 mg/dL    <150         H            



             [Mass/volume] in                                        



             Serum or Plasma (test                                        



             code = 2571-8)                                        

 

             Cholesterol in LDL 94 mg/dL                               



             [Mass/volume] in (calc)                                 



             Serum or Plasma by                                        



             calculation (test                                        



             code = 21168-1)                                        

 

             Cholesterol.total/Cho 4.3 (calc)   <5.0                      



             lesterol.in HDL [Mass                                        



             ratio] in Serum or                                        



             Plasma (test code =                                        



             9830-1)                                             

 

             Cholesterol non  mg/dL    <130                      



             [Mass/volume] in (calc)                                 



             Serum or Plasma (test                                        



             code = 37724-0)                                        



[facility] - Serum or 
[id number] 00:00:00





             Test Item    Value        Reference Range Interpretation Comments

 

             Glucose [Mass/volume] in 130 mg/dL    65-99        H            



             Serum or Plasma (test                                        



             code = 2345-7)                                        

 

             Urea nitrogen 25 mg/dL     7-25                      



             [Mass/volume] in Serum                                        



             or Plasma (test code =                                        



             3094-0)                                             

 

             Creatinine [Mass/volume] 1.22 mg/dL   0.60-0.93    H            



             in Serum or Plasma (test                                        



             code = 2160-0)                                        

 

             Glomerular filtration 44           See_Comment  L             [Auto

mated



             rate/1.73 sq M.predicted mL/min/1.73m                           mes

sergei] The



             among non-blacks [Volume 2                                      sys

tem which



             Rate/Area] in Serum,                                        generat

ed this



             Plasma or Blood by                                        result tr

ansmitted



             Creatinine-based formula                                        ref

erence range: >



             (CKD-EPI) (test code =                                        or = 

60. The



             89793-3)                                            reference range



                                                                 was not used to



                                                                 interpret this



                                                                 result as



                                                                 normal/abnormal

.

 

             Glomerular filtration 51           See_Comment  L             [Auto

mated



             rate/1.73 sq M.predicted mL/min/1.73m                           mes

sergei] The



             among blacks [Volume 2                                      system 

which



             Rate/Area] in Serum,                                        generat

ed this



             Plasma or Blood by                                        result tr

ansmitted



             Creatinine-based formula                                        ref

erence range: >



             (CKD-EPI) (test code =                                        or = 

60. The



             12592-3)                                            reference range



                                                                 was not used to



                                                                 interpret this



                                                                 result as



                                                                 normal/abnormal

.

 

             Urea nitrogen/Creatinine 20 (calc)    6-22                      



             [Mass Ratio] in Serum or                                        



             Plasma (test code =                                        



             3097-3)                                             

 

             Sodium [Moles/volume] in 141 mmol/L   135-146                   



             Serum or Plasma (test                                        



             code = 2951-2)                                        

 

             Potassium [Moles/volume] 4.1 mmol/L   3.5-5.3                   



             in Serum or Plasma (test                                        



             code = 2823-3)                                        

 

             Chloride [Moles/volume] 108 mmol/L                       



             in Serum or Plasma (test                                        



             code = -0)                                        

 

             Carbon dioxide, total 25 mmol/L    20-32                     



             [Moles/volume] in Serum                                        



             or Plasma (test code =                                        



             -)                                             

 

             Calcium [Mass/volume] in 9.4 mg/dL    8.6-10.4                  



             Serum or Plasma (test                                        



             code = 64836-3)                                        

 

             Protein [Mass/volume] in 6.1 g/dL     6.1-8.1                   



             Serum or Plasma (test                                        



             code = 2885-2)                                        

 

             Albumin [Mass/volume] in 3.7 g/dL     3.6-5.1                   



             Serum or Plasma (test                                        



             code = 1751-7)                                        

 

             Globulin [Mass/volume] 2.4 g/dL     1.9-3.7                   



             in Serum by calculation (calc)                                 



             (test code = 97615-4)                                        

 

             Albumin/Globulin [Mass 1.5 (calc)   1.0-2.5                   



             Ratio] in Serum or                                        



             Plasma (test code =                                        



             1759-0)                                             

 

             Bilirubin.total 0.4 mg/dL    0.2-1.2                   



             [Mass/volume] in Serum                                        



             or Plasma (test code =                                        



             -)                                             

 

             Alkaline phosphatase 56 U/L                           



             [Enzymatic                                          



             activity/volume] in                                        



             Serum or Plasma (test                                        



             code = 6768-6)                                        

 

             Aspartate    36 U/L       10-35        H            



             aminotransferase                                        



             [Enzymatic                                          



             activity/volume] in                                        



             Serum or Plasma (test                                        



             code = 1920-8)                                        

 

             Alanine aminotransferase 13 U/L       6-29                      



             [Enzymatic                                          



             activity/volume] in                                        



             Serum or Plasma (test                                        



             code = 1742-6)                                        



[facility]yroxine (T4) free [Mass/volume] in Serum or 
[id number] 00:00:00





             Test Item    Value        Reference Range Interpretation Comments

 

             Thyroxine (T4) free [Mass/volume] 1.6 NG/dL    0.8-1.8             

      



             in Serum or Plasma (test code =                                    

    



             3024-7)                                             



[facility] MedicineThyrotropin [Units/volume] in Serum or Plasma
2022 00:00:00





             Test Item    Value        Reference Range Interpretation Comments

 

             Thyrotropin [Units/volume] in 2.51 mIU/L   0.40-4.50               

  



             Serum or Plasma (test code =                                       

 



             3016-3)                                             



[facility] [Mass/volume] in 
Serum or [id number] 00:00:00





             Test Item    Value        Reference Range Interpretation Comments

 

             Triiodothyronine (T3) Free 2.3 pg/mL    2.3-4.2                   



             [Mass/volume] in Serum or Plasma                                   

     



             (test code = 3051-0)                                        



[facility] in Serum or 
[id number] 00:00:00





             Test Item    Value        Reference Range Interpretation Comments

 

             Parathyrin.intact [Mass/volume] in 48 pg/mL     16-77              

       



             Serum or Plasma (test code = 2731-8)                               

         



[facility] Hlbjiedy09-Ldzduuvcdevbeb D3+25-Hydroxyvitamin D2 
[Mass/volume] in Serum or [id number] 00:00:00





             Test Item    Value        Reference Range Interpretation Comments

 

             25-hydroxyvitamin D3 [Mass/volume] 60 NG/mL                  

       



             in Serum or Plasma (test code =                                    

    



             1989-3)                                             



[facility] MedicineHemoglobin A1c/Hemoglobin.total in Blood
2022 00:00:00





             Test Item    Value        Reference Range Interpretation Comments

 

             Hemoglobin   6.2 % of total HGB <5.7         H            



             A1c/Hemoglobin.total in                                        



             Blood (test code = 4548-4)                                        



[facility] or [id number] 
00:00:00





             Test Item    Value        Reference Range Interpretation Comments

 

             Cholesterol  162 mg/dL    <200                      



             [Mass/volume] in                                        



             Serum or Plasma (test                                        



             code = 2093-3)                                        

 

             Cholesterol in HDL 38 mg/dL     See_Comment  L             [Automat

ed



             [Mass/volume] in                                        message] Th

e system



             Serum or Plasma (test                                        which 

generated



             code = 2085-9)                                        this result



                                                                 transmitted



                                                                 reference range

: >



                                                                 or = 50. The



                                                                 reference range

 was



                                                                 not used to



                                                                 interpret this



                                                                 result as



                                                                 normal/abnormal

.

 

             Triglyceride 209 mg/dL    <150         H            



             [Mass/volume] in                                        



             Serum or Plasma (test                                        



             code = 2571-8)                                        

 

             Cholesterol in LDL 94 mg/dL                               



             [Mass/volume] in (calc)                                 



             Serum or Plasma by                                        



             calculation (test                                        



             code = 01883-8)                                        

 

             Cholesterol.total/Cho 4.3 (calc)   <5.0                      



             lesterol.in HDL [Mass                                        



             ratio] in Serum or                                        



             Plasma (test code =                                        



             9830-1)                                             

 

             Cholesterol non  mg/dL    <130                      



             [Mass/volume] in (calc)                                 



             Serum or Plasma (test                                        



             code = 70204-0)                                        



[facility] - Serum or 
[id number] 00:00:00





             Test Item    Value        Reference Range Interpretation Comments

 

             Glucose [Mass/volume] in 130 mg/dL    65-99        H            



             Serum or Plasma (test                                        



             code = 2345-7)                                        

 

             Urea nitrogen 25 mg/dL     7-25                      



             [Mass/volume] in Serum                                        



             or Plasma (test code =                                        



             3094-0)                                             

 

             Creatinine [Mass/volume] 1.22 mg/dL   0.60-0.93    H            



             in Serum or Plasma (test                                        



             code = 2160-0)                                        

 

             Glomerular filtration 44           See_Comment  L             [Auto

mated



             rate/1.73 sq M.predicted mL/min/1.73m                           mes

sergei] The



             among non-blacks [Volume 2                                      sys

tem which



             Rate/Area] in Serum,                                        generat

ed this



             Plasma or Blood by                                        result tr

ansmitted



             Creatinine-based formula                                        ref

erence range: >



             (CKD-EPI) (test code =                                        or = 

60. The



             59556-6)                                            reference range



                                                                 was not used to



                                                                 interpret this



                                                                 result as



                                                                 normal/abnormal

.

 

             Glomerular filtration 51           See_Comment  L             [Auto

mated



             rate/1.73 sq M.predicted mL/min/1.73m                           mes

sergei] The



             among blacks [Volume 2                                      system 

which



             Rate/Area] in Serum,                                        generat

ed this



             Plasma or Blood by                                        result tr

ansmitted



             Creatinine-based formula                                        ref

erence range: >



             (CKD-EPI) (test code =                                        or = 

60. The



             84798-6)                                            reference range



                                                                 was not used to



                                                                 interpret this



                                                                 result as



                                                                 normal/abnormal

.

 

             Urea nitrogen/Creatinine 20 (calc)    6-22                      



             [Mass Ratio] in Serum or                                        



             Plasma (test code =                                        



             3097-3)                                             

 

             Sodium [Moles/volume] in 141 mmol/L   135-146                   



             Serum or Plasma (test                                        



             code = 2951-2)                                        

 

             Potassium [Moles/volume] 4.1 mmol/L   3.5-5.3                   



             in Serum or Plasma (test                                        



             code = 2823-3)                                        

 

             Chloride [Moles/volume] 108 mmol/L                       



             in Serum or Plasma (test                                        



             code = -0)                                        

 

             Carbon dioxide, total 25 mmol/L    20-32                     



             [Moles/volume] in Serum                                        



             or Plasma (test code =                                        



             -)                                             

 

             Calcium [Mass/volume] in 9.4 mg/dL    8.6-10.4                  



             Serum or Plasma (test                                        



             code = 44706-0)                                        

 

             Protein [Mass/volume] in 6.1 g/dL     6.1-8.1                   



             Serum or Plasma (test                                        



             code = 2885-2)                                        

 

             Albumin [Mass/volume] in 3.7 g/dL     3.6-5.1                   



             Serum or Plasma (test                                        



             code = 1751-7)                                        

 

             Globulin [Mass/volume] 2.4 g/dL     1.9-3.7                   



             in Serum by calculation (calc)                                 



             (test code = 12195-0)                                        

 

             Albumin/Globulin [Mass 1.5 (calc)   1.0-2.5                   



             Ratio] in Serum or                                        



             Plasma (test code =                                        



             1759-0)                                             

 

             Bilirubin.total 0.4 mg/dL    0.2-1.2                   



             [Mass/volume] in Serum                                        



             or Plasma (test code =                                        



             -)                                             

 

             Alkaline phosphatase 56 U/L                           



             [Enzymatic                                          



             activity/volume] in                                        



             Serum or Plasma (test                                        



             code = 6768-6)                                        

 

             Aspartate    36 U/L       10-35        H            



             aminotransferase                                        



             [Enzymatic                                          



             activity/volume] in                                        



             Serum or Plasma (test                                        



             code = 0-8)                                        

 

             Alanine aminotransferase 13 U/L       6-29                      



             [Enzymatic                                          



             activity/volume] in                                        



             Serum or Plasma (test                                        



             code = 1742-6)                                        



[facility] MedicineThyroxine (T4) free [Mass/volume] in Serum or 
[id number] 00:00:00





             Test Item    Value        Reference Range Interpretation Comments

 

             Thyroxine (T4) free [Mass/volume] 1.6 NG/dL    0.8-1.8             

      



             in Serum or Plasma (test code =                                    

    



             3024-7)                                             



[facility] MedicineThyrotropin [Units/volume] in Serum or Plasma
2022 00:00:00





             Test Item    Value        Reference Range Interpretation Comments

 

             Thyrotropin [Units/volume] in 2.51 mIU/L   0.40-4.50               

  



             Serum or Plasma (test code =                                       

 



             3016-3)                                             



[facility] [Mass/volume] in 
Serum or [id number] 00:00:00





             Test Item    Value        Reference Range Interpretation Comments

 

             Triiodothyronine (T3) Free 2.3 pg/mL    2.3-4.2                   



             [Mass/volume] in Serum or Plasma                                   

     



             (test code = 3051-0)                                        



[facility] HospitalParathyrin.intact [Mass/volume] in Serum or 
[id number] 00:00:00





             Test Item    Value        Reference Range Interpretation Comments

 

             Parathyrin.intact [Mass/volume] in 48 pg/mL     16-77              

       



             Serum or Plasma (test code = 2731-8)                               

         



[facility] Tvyufsjh98-Uesodsryvyecdd D3+25-Hydroxyvitamin D2 
[Mass/volume] in Serum or [id number] 00:00:00





             Test Item    Value        Reference Range Interpretation Comments

 

             25-hydroxyvitamin D3 [Mass/volume] 60 NG/mL                  

       



             in Serum or Plasma (test code =                                    

    



             1989-3)                                             



[facility] A[facility]c/Hemoglobin.total in Blood
2022 00:00:00





             Test Item    Value        Reference Range Interpretation Comments

 

             Hemoglobin   6.2 % of total HGB <5.7         H            



             A1c/Hemoglobin.total in                                        



             Blood (test code = 4548-4)                                        



[facility] MedicineVitamin B12 [id number] 08:57:00





             Test Item    Value        Reference    Interpretation Comments



                                       Range                     

 

             Vitamin B12  247 pg/mL    200-1100                   Please Note: A

lthough the



             (test code =                                        reference range

 for



             2132)                                             jmomvfcV13 is 2





                                                                 pg/mL, it has b

een reported



                                                                 that between5 a

nd 10% of



                                                                 patients with v

alues between



                                                                 200 and 400pg/m

L may



                                                                 experience neur

opsychiatric



                                                                 and hematologic

abnormalities



                                                                 due to occult B

12



                                                                 deficiency; les

s than 1%of



                                                                 patients with v

alues above



                                                                 400 pg/mL will 

have



                                                                 symptoms.

 

             KATIE (test    FASTING:YES                            



             code = KATIE)  FASTING: YES                           

 

             RAC (test    Performing                             



             code = RAC)  Organization                           



                          Information: Site                           



                          ID: [facility] Name:                           



                          [facility] Address:                           



                          [address] [place] [address]                             



                          Director: Satnam                           



                          WALK[facility]on[facility]Jess[facility]Select [facility] 08:57:00





             Test Item    Value        Reference Range Interpretation Comments

 

             Ferritin level (test 12 ng/mL            L            



             code = 2276-4)                                        

 

             KATIE (test code = KATIE) FASTING:YES FASTING:                         

  



                          YES                                    

 

             RAC (test code = RAC) Performing                             



                          Organization                           



                          Information: Site ID:                           



                          [facility]                           



                          Lab Address: [address] [place] [address] Director:                           



                          Satnam Lopez                           

 

             Lab Interpretation (test Abnormal                               



             code = 36219-0)                                        



[facility] [id number] 08:57:00





             Test Item    Value        Reference Range Interpretation Comments

 

             Folate (test 7.8 ng/mL                               Reference Rang

e



             code = 2284-8)                                        Low: <3.4



                                                                 Borderline:



                                                                 3.4-5.4  Normal

:



                                                                 >5.4

 

             KATIE (test code FASTING:YES FASTING:                           



             = KATIE)       YES                                    

 

             RAC (test code Performing                             



             = RAC)       Organization                           



                          Information: Site ID:                           



                          [facility]                           



                          Lab Address: [address] [place] [address] Director:                           



                          Satnam Jeronimoridge                           



[facility] [id number] 08:57:00





             Test Item    Value        Reference Range Interpretation Comments

 

             [facility] (test code =              See_Comment                [Automated



             0190-2)                                             message] The



                                                                 system which



                                                                 generated this



                                                                 result



                                                                 transmitted



                                                                 reference range

:



                                                                 3.8 - 10.8



                                                                 Thousand/uL. Th

e



                                                                 reference range



                                                                 was not used to



                                                                 interpret this



                                                                 result as



                                                                 normal/abnormal

.

 

             RBC (test code =              See_Comment  L             [Automated



             361-8)                                              message] The



                                                                 system which



                                                                 generated this



                                                                 result



                                                                 transmitted



                                                                 reference range

:



                                                                 3.80 - 5.10



                                                                 Million/uL. The



                                                                 reference range



                                                                 was not used to



                                                                 interpret this



                                                                 result as



                                                                 normal/abnormal

.

 

             HGB (test code = 8.0 g/dL     11.7-15.5    L            



             718-7)                                              

 

             HCT (test code = 27.7 %       35-45        L            



             4544-3)                                             

 

             MCV (test code = 76.7 fL             L            



             787-2)                                              

 

             MCH (test code = 22.2 pg      27-33        L            



             785-6)                                              

 

             MCHC (test code = 28.9 g/dL    32-36        L            



             786-4)                                              

 

             RDW (test code = 15.1 %       11-15        H            



             788-0)                                              

 

             Platelet count (test              See_Comment                [Autom

ated



             code = 777-3)                                        message] The



                                                                 system which



                                                                 generated this



                                                                 result



                                                                 transmitted



                                                                 reference range

:



                                                                 140 - 400



                                                                 Thousand/uL. Th

e



                                                                 reference range



                                                                 was not used to



                                                                 interpret this



                                                                 result as



                                                                 normal/abnormal

.

 

             MPV (test code = 10.3 fL      7.5-12.5                  



             776-5)                                              

 

             KATIE (test code = FASTING:YES                            



             KATIE)         FASTING: YES                           

 

             RAC (test code = Performing                             



             RAC)         Organization                           



                          Information: Site                           



                          ID: RGA Name:                           



                          [facility]                           



                          n Lab Address:                           



                          [address] [place] [address]                           



                          [id number]                             



                          Director: Satnam Lpoez                           

 

             Lab Interpretation Abnormal                               



             (test code =                                        



             05103-1)                                            



[facility] iron binding [id number] 08:57:00





             Test Item    Value        Reference Range Interpretation Comments

 

             Iron level (test              See_Comment  L             [Automated



             code = 2498-4)                                        message] The



                                                                 system which



                                                                 generated this



                                                                 result



                                                                 transmitted



                                                                 reference range

:



                                                                 45 - 160 mcg/dL

.



                                                                 The reference



                                                                 range was not



                                                                 used to interpr

et



                                                                 this result as



                                                                 normal/abnormal

.

 

             Iron binding              See_Comment  H             [Automated



             capacity (test code                                        message]

 The



             = 2500-7)                                           system which



                                                                 generated this



                                                                 result



                                                                 transmitted



                                                                 reference range

:



                                                                 250 - 450 mcg/d

L



                                                                 (calc). The



                                                                 reference range



                                                                 was not used to



                                                                 interpret this



                                                                 result as



                                                                 normal/abnormal

.

 

             Iron saturation              See_Comment  L             [Automated



             (test code = 2182-3)                                        message

] The



                                                                 system which



                                                                 generated this



                                                                 result



                                                                 transmitted



                                                                 reference range

:



                                                                 16 - 45 % (calc

).



                                                                 The reference



                                                                 range was not



                                                                 used to interpr

et



                                                                 this result as



                                                                 normal/abnormal

.

 

             KATIE (test code = FASTING:YES                            



             KATIE)         FASTING: YES                           

 

             RAC (test code = Performing                             



             RAC)         Organization                           



                          Information: Site                           



                          ID: RGA Name:                           



                          [facility] Address:                           



                          [address] [place] [address]                           



                          [address]                             



                          Director: Satnam Lopez                           

 

             Lab Interpretation Abnormal                               



             (test code =                                        



             20936-6)                                            



[facility] 12 [id number] 22:17:49





             Test Item    Value        Reference Range Interpretation Comments

 

             Ventricular rate                                        



             (test code = 253)                                        

 

             Atrial rate (test                                        



             code = 255)                                         

 

             AL interval (test                                        



             code = 266)                                         

 

             QRSD interval (test                                        



             code = 260)                                         

 

             QT interval (test                                        



             code = 264)                                         

 

             QTC interval (test                                        



             code = 265)                                         

 

             QRS axis 1 (test                                        



             code = 268)                                         

 

             T wave axis (test                                        



             code = 270)                                         

 

             EKG impression (test Atrial-paced rhythm with                      

     



             code = 273)  prolonged AV                           



                          conduction-Left axis                           



                          deviation-Left bundle                           



                          branch block-Abnormal                           



                          ECG-In automated                           



                          comparison with ECG of                           



                          2021                            



                          13:01,-Previous ECG has                           



                          undetermined rhythm,                           



                          needs review-Left bundle                           



                          branch block has replaced                           



                          Nonspecific                            



                          intraventricular                           



                          block-Borderline criteria                           



                          for Lateral infarct are                           



                          no longer                              



                          present-Criteria for                           



                          Inferior infarct are no                           



                          longer                                 



                          present-Electronically                           



                          Signed By Duglas ALMARAZ,                           



                          [facility] ([id number]) on 2022                           



                          4:17:47 PM                             



[facility] [id number] 23:34:00





             Test Item    Value        Reference    Interpretation Comments



                                       Range                     

 

             Urine culture (test SEE NOTE                  A             CULTURE

, URINE,



             code = 630-4)                                        ROUTINE Micro



                                                                 Number: [id number]

8



                                                                 Test Status: Fi

nal



                                                                 Specimen Source

:



                                                                 Urine Specimen



                                                                 Quality: [place]

te



                                                                 Result: Greater



                                                                 than 100,000



                                                                 CFU/mL of



                                                                 Citrobacter



                                                                 freundii



                                                                 C.freundii



                                                                 ---------------

-



                                                                 [place]



                                                                 AMOX/CLAVULANAT

E R



                                                                 8 AMPICILLIN R 

16



                                                                 CEFAZOLIN R >=6

4



                                                                 **1 CEFEPIME S 

<=1



                                                                 CEFTRIAXONE S <

=1



                                                                 CIPROFLOXACIN  

S



                                                                 <=0.25 GENTAMIC

IN



                                                                 S <=1 IMIPENEM 

S



                                                                 0.5 LEVOFLOXACI

N S



                                                                 <=0.12



                                                                 NITROFURANTOIN 

S



                                                                 32 PIP/TAZOBACT

AM



                                                                 S <=4 TOBRAMYCI

N S



                                                                 <=1



                                                                 TRIMETHOPRIM/SHEPARD

LFA



                                                                 S <=20



                                                                 S=Susceptible



                                                                 I=Intermediate



                                                                 R=Resistant * =



                                                                 Not TestedNR = 

Not



                                                                 Reported **NN =



                                                                 See Therapy



                                                                 Comments  THERA

PY



                                                                 COMMENTS Note 1

:



                                                                 For uncomplicat

ed



                                                                 UTI caused by E

.



                                                                 coli, K.



                                                                 pneumoniae or P

.



                                                                 mirabilis:



                                                                 Cefazolin is



                                                                 susceptible if 

ANIYAH



                                                                 <32 mcg/mL and



                                                                 predicts



                                                                 susceptible to 

the



                                                                 oral agents



                                                                 cefaclor,



                                                                 cefdinir,



                                                                 cefpodoxime,



                                                                 cefprozil,



                                                                 cefuroxime,



                                                                 cephalexin and



                                                                 loracarbef.

 

             KATIE (test code = FASTING:YES                            



             KATIE)         FASTING: YES                           

 

             RAC (test code = Performing                             



             RAC)         Organization                           



                          Information: Site                           



                          ID: JEOVANNY Name:                           



                          [facility] Address:                           



                          [address] [place] [address]                           



                          [phone]                             



                          Director: Satnam Lopez                           

 

             Lab Interpretation Abnormal                               



             (test code =                                        



             65237-1)                                            



[facility] protein/creatinine ratio, [id number] 21:07:00





             Test Item    Value        Reference Range Interpretation Comments

 

             Creatinine,  69 mg/dL                         



             urine (mg/dL)                                        



             (test code =                                        



             2161-8)                                             

 

             Protein/Creat              See_Comment                [Automated



             Ratio (test                                         message] The sy

stem



             code = 2890-2)                                        which generat

ed



                                                                 this result



                                                                 transmitted



                                                                 reference range

:



                                                                 0.021 - 0.161 m

g/mg



                                                                 creat. The



                                                                 reference range

 was



                                                                 not used to



                                                                 interpret this



                                                                 result as



                                                                 normal/abnormal

.

 

             Protein, urine 8 mg/dL      5-24                      



             random (test                                        



             code = 2888-6)                                        

 

             KATIE (test code 0; 0                                   



             = KATIE)                                              

 

             RAC (test code Performing                             



             = RAC)       Organization                           



                          Information: Site                           



                          ID: JEOVANNY [facility]                           



                          Lab Address: [address] [place] [address]                            



                          [phone] Director:                           



                          Satnam Lopez                           



[facility] [id number] 
21:07:00





             Test Item    Value        Reference    Interpretation Comments



                                       Range                     

 

             Color, UA (test code YELLOW       YELLOW                    



             = 5778-6)                                           

 

             Appearance (test CLOUDY       CLEAR        A            



             code = 5767-9)                                        

 

             Specific gravity,              1.001-1.035               



             urine (test code =                                        



             5811-5)                                             

 

             pH, urine (test code              5-8                       



             = 5803-2)                                           

 

             Glucose, urine (test NEGATIVE     NEGATIVE                  



             code = 96862-5)                                        

 

             Bilirubin, UA (test NEGATIVE     NEGATIVE                  



             code = 5770-3)                                        

 

             Ketones, UA (test NEGATIVE     NEGATIVE                  



             code = 9664-8)                                        

 

             Occult blood, urine NEGATIVE     NEGATIVE                  



             (test code = 5794-3)                                        

 

             Protein, UA (test NEGATIVE     NEGATIVE                  



             code = 75428-6)                                        

 

             Nitrite, UA (test NEGATIVE     NEGATIVE                  



             code = 5802-4)                                        

 

             Leukocyte esterase, 1+           NEGATIVE     A            



             UA (test code =                                        



             5799-2)                                             

 

             WBC, UA (test code = 10-20        See_Comment  A             [Autom

ated



             5821-4)                                             message] The sy

stem



                                                                 which generated



                                                                 this result



                                                                 transmitted



                                                                 reference range

: <



                                                                 OR = 5 /HPF. Th

e



                                                                 reference range

 was



                                                                 not used to



                                                                 interpret this



                                                                 result as



                                                                 normal/abnormal

.

 

             RBC, UA (test code = NONE SEEN    See_Comment                [Autom

ated



             58166-4)                                            message] The sy

stem



                                                                 which generated



                                                                 this result



                                                                 transmitted



                                                                 reference range

: <



                                                                 OR = 2 /HPF. Th

e



                                                                 reference range

 was



                                                                 not used to



                                                                 interpret this



                                                                 result as



                                                                 normal/abnormal

.

 

             Squamous epithelial NONE SEEN    See_Comment                [Automa

alex



             cells, UA (test code                                        message

] The system



             = 67023-2)                                          which generated



                                                                 this result



                                                                 transmitted



                                                                 reference range

: <



                                                                 OR = 5 /HPF. Th

e



                                                                 reference range

 was



                                                                 not used to



                                                                 interpret this



                                                                 result as



                                                                 normal/abnormal

.

 

             Bacteria, UA (test MODERATE     NONE SEEN /HPF A            



             code = 5769-5)                                        

 

             Calcium oxalate FEW          NONE OR FEW               



             crystals, UA (test              /HPF                      



             code = 94849-3)                                        

 

             Hyaline casts, UA NONE SEEN    NONE SEEN /LPF              



             (test code = 5796-8)                                        

 

             Urine culture (test SEE NOTE                  A             CULTURE

, URINE,



             code = 630-4)                                        ROUTINE Micro



                                                                 Number: [id number]

5



                                                                 Test Status: Fi

nal



                                                                 Specimen Source

:



                                                                 Urine Specimen



                                                                 Quality: [place]

te



                                                                 Result: Greater



                                                                 than 100,000 CF

U/mL



                                                                 of Citrobacter



                                                                 freundii C.freu

ndii



                                                                 ---------------

-



                                                                 INT ANIYAH



                                                                 AMOX/CLAVULANAT

E R



                                                                 >=32 AMPICILLIN

 R



                                                                 16 CEFAZOLIN R 

>=64



                                                                 **1 CEFEPIME S 

<=1



                                                                 CEFTRIAXONE S <

=1



                                                                 CIPROFLOXACIN S



                                                                 <=0.25 GENTAMIC

IN S



                                                                 <=1 LEVOFLOXACI

N S



                                                                 <=0.12



                                                                 NITROFURANTOIN 

S



                                                                 <=16 PIP/TAZOBA

CTAM



                                                                 S <=4 TOBRAMYCI

N S



                                                                 <=1



                                                                 TRIMETHOPRIM/SHEPARD

LFA



                                                                 S <=20



                                                                 S=Susceptible



                                                                 I=Intermediate



                                                                 R=Resistant * =

 Not



                                                                 TestedNR = Not



                                                                 Reported **NN =

 See



                                                                 Therapy Comment

s



                                                                 THERAPY COMMENT

S



                                                                 Note 1: For



                                                                 uncomplicated U

TI



                                                                 caused by E. co

li,



                                                                 K. pneumoniae o

r P.



                                                                 mirabilis:



                                                                 Cefazolin is



                                                                 susceptible if 

ANIYAH



                                                                 <32 mcg/mL and



                                                                 predicts



                                                                 susceptible to 

the



                                                                 oral agents



                                                                 cefaclor, cefdi

ajay,



                                                                 cefpodoxime,



                                                                 cefprozil,



                                                                 cefuroxime,



                                                                 cephalexin and



                                                                 loracarbef.

 

             KATIE (test code = 0; 0                                   



             KATIE)                                                

 

             RAC (test code = Performing                             



             RAC)         Organization                           



                          Information: Site                           



                          ID: RGA Name:                           



                          [facility]                           



                          on Lab Address:                           



                          [address] [place] [address]                           



                          [id number]                             



                          Director: Satnam Lopez                           

 

             Lab Interpretation Abnormal                               



             (test code =                                        



             16136-1)                                            



Jacy Velasquez-CoV-2 (COVID-19) RNA [Presence] in Respiratory specimen 
by SHAKILA with probe [id number] 19:25:25





             Test Item    Value        Reference Range Interpretation Comments

 

             SARS-CoV-2 (COVID-19) RNA Not detected Not-Detected              



             [Presence] in Respiratory                                        



             specimen by SHAKILA with probe                                        



             detection (test code = 35803-6)                                    

    

 

             Whether patient is employed in a                                   

     



             healthcare setting (test code =                                    

    



             79925-1)                                            

 

             Whether the patient has symptoms                                   

     



             related to condition of interest                                   

     



             (test code = 02491-3)                                        

 

             Patient was hospitalized because                                   

     



             of this condition (test code =                                     

   



             49220-2)                                            

 

             Whether the patient was admitted                                   

     



             to intensive care unit (ICU) for                                   

     



             condition of interest (test code                                   

     



             = 93227-7)                                          

 

             Whether patient resides in a                                       

 



             congregate care setting (test                                      

  



             code = 70141-5)                                        



[facility] TAHTWAIU-WqF-7 (COVID-19) RNA [Presence] in Respiratory 
specimen by SHAKILA with probe [id number] 14:45:18





             Test Item    Value        Reference Range Interpretation Comments

 

             SARS-CoV-2 (COVID-19) RNA Not detected Not-Detected              



             [Presence] in Respiratory                                        



             specimen by SHAKILA with probe                                        



             detection (test code = 87127-8)                                    

    



[facility] [id number] 16:56:00





             Test Item    Value        Reference Range Interpretation Comments

 

             GLUBED (test code = GLUBED) 122 mg/dL           N            



[id number] 12:34:00





             Test Item    Value        Reference Range Interpretation Comments

 

             GLUBED (test code = GLUBED) 91 mg/dL            N            



- XR CHEST 1 -83-09 08:39:00 Patient Name: MARIA G ARTEAGA Unit No: 
[id number]  EXAMS: CPT CODE: [id number] XR CHEST 1 V 46211 Portable chest one 
view. HISTORY: PICC line placement COMMENT: The lungs are clear and normally e
xpanded. There is a right subclavian PICC line with its tip overlying the 
superior vena cava. This is in good position. No evidence of pneumothorax. The 
heart and pulmonary vasculature is normal. Visualized soft tissues and skeletal 
structures are unremarkable. There is a pacemaker overlying the left lateral rib
 cage. The pacemaker leads overlie the heart. DIAGNOSIS: 1. PICC line is in good
 position.No evidence of pneumothorax.  ** Electronically Signed by ENEDELIA Sunshine MD on 2019 at 0839 ** Reported and signed by: ENEDELIA Sunshine MD  CC: 
Toan Renae MD Technologist: MARY CARMEN CADE RT(R); GINNA BAIRES (RT.R) 
Transcribed D/T: 2019 (0839) [facility] NAME: 
MARIA G ARTEAGA [address] [facility] PHYS: Toan Malin MD : 
1950 AGE: 68 SEX: [place] [address] ACCT NO: [id number] LOC: Y.506 A
 PHONE #: [phone] EXAM DATE: 09/10/2019 STATUS: ADM IN FAX #: [phone] 
RAD #: D/C DT PAGE 1 Signed Report Patient Name: MARIA G ARTEAGA Unit No:
 [id number] EXAMS: CPT CODE: [id number] XR CHEST 1 V 68073 (Continued) Orig Print
 D/T: S: 2019 (0843)  [facility] NAME: MARIA G ARTEAGA [address] [facility] PHYS: Toan Malin MD  : 1950 AGE: 
68 SEX: [place] [address] ACCT NO: [id number] LOC: Y.506 A PHONE #: 
[phone] EXAM DATE: 09/10/2019 STATUS: ADM IN FAX #: [phone] RAD #: D/C
 DT PAGE 2 Signed ReportCREATINE KINASE (CK)2019 07:01:00





             Test Item    Value        Reference Range Interpretation Comments

 

             CREATINE KINASE (CK) (test code = 47 Units/L                 

      



             CK)                                                 



CREATINE KINASE (CK)2019 07:01:00





             Test Item    Value        Reference Range Interpretation Comments

 

             CREATINE KINASE (CK) (test code = 47 Units/L          N      

      



             CK)                                                 



[id number] 06:26:00





             Test Item    Value        Reference Range Interpretation Comments

 

             GLUBED (test code = GLUBED) 113 mg/dL           N            



GLUBED2019-09-10 21:10:00





             Test Item    Value        Reference Range Interpretation Comments

 

             GLUBED (test code = GLUBED) 97 mg/dL            N            



GLUBED2019-09-10 17:22:00





             Test Item    Value        Reference Range Interpretation Comments

 

             GLUBED (test code = GLUBED) 189 mg/dL           H            



GLUBED2019-09-10 12:33:00





             Test Item    Value        Reference Range Interpretation Comments

 

             GLUBED (test code = GLUBED) 135 mg/dL           H            



GLUBED2019-09-10 09:52:00





             Test Item    Value        Reference Range Interpretation Comments

 

             GLUBED (test code = GLUBED) 98 mg/dL            N            



BASIC METABOLIC PANEL2019-09-10 07:23:00





             Test Item    Value        Reference Range Interpretation Comments

 

             SODIUM (test code = 141 mmol/L   136-145      N            



             NA)                                                 

 

             POTASSIUM (test code = 4.7 mmol/L   3.5-5.1      N            



             K)                                                  

 

             CHLORIDE (test code = 108.0 mmol/L        H            



             CL)                                                 

 

             CARBON DIOXIDE (test 20.8 mmol/L  21-32        L            



             code = CO2)                                         

 

             GLUCOSE (test code = 134 mg/dL           H            



             GLU)                                                

 

             BLOOD UREA NITROGEN 21 mg/dL     7-18         H            



             (test code = BUN)                                        

 

             GLOMERULAR FILTRATION 45.4         >60                       Unit o

f measure:



             RATE (test code = GFR)                                        mL/mi

n/1.73



                                                                 [id number]



                                                                 Range:Healthy



                                                                 Adults >90



                                                                 mL/min/1.73 m2 

For



                                                                 Chronic Kidney



                                                                 Disease: Stage 

II



                                                                 Mild Decrease i

n



                                                                 GFR 60-90 Stage

 III



                                                                 Moderate Decrea

se



                                                                 in GFR 30-59 St

age



                                                                 IV Severe Decre

ase



                                                                 in GFR 15-29 St

age



                                                                 V Kidney Failur

e



                                                                 <15

 

             CREATININE (test code 1.18 mg/dL   0.55-1.30    N            



             = CREAT)                                            

 

             CALCIUM (test code = 8.0 mg/dL    8.2-10.1     L            



             CA)                                                 



GLUBED2019-09-10 06:44:00





             Test Item    Value        Reference Range Interpretation Comments

 

             GLUBED (test code = GLUBED) 131 mg/dL           H            



HGB HCT2019-09-10 06:15:00





             Test Item    Value        Reference Range Interpretation Comments

 

             HEMOGLOBIN (test code = HGB) 9.9 g/dL     12-16        L           

 

 

             HEMATOCRIT (test code = HCT) 30.2 %       37-47        L           

 



[id number] 21:14:00





             Test Item    Value        Reference Range Interpretation Comments

 

             GLUBED (test code = GLUBED) 181 mg/dL           H            



COMPREHENSIVE METABOLIC [id number] 20:13:00





             Test Item    Value        Reference Range Interpretation Comments

 

             SODIUM (test code = NA) 144 mEq/L    135-145                   

 

             POTASSIUM (test code = 4.4 mEq/L    3.5-5.0                   



             K)                                                  

 

             CHLORIDE (test code = 107 mEq/L    100-115                   



             CL)                                                 

 

             CARBON DIOXIDE (test 24 mEq/L     22-31                     



             code = CO2)                                         

 

             GLUCOSE (test code = 138 mg/dL           H            



             GLU)                                                

 

             BLOOD UREA NITROGEN 32 mg/dL     7-18         H            



             (test code = BUN)                                        

 

             GLOMERULAR FILTRATION 29.8         >60                       Unit o

f measure:



             RATE (test code = GFR)                                        mL/mi

n/1.73



                                                                 [id number]



                                                                 Range:Healthy A

dults



                                                                 >90 mL/min/1.73

 m2



                                                                 For Chronic Kid

jose



                                                                 Disease: Stage 

II



                                                                 Mild Decrease i

n GFR



                                                                 60-90 Stage III



                                                                 Moderate Decrea

se in



                                                                 GFR 30-59 Stage

 IV



                                                                 Severe Decrease

 in



                                                                 GFR 15-29 Stage

 V



                                                                 Kidney Failure



                                                                 <15Unit of carmen

ure:



                                                                 mL/min/1.73



                                                                 [id number]



                                                                 Range:Healthy A

dults



                                                                 >90 mL/min/1.73

 m2



                                                                 For Chronic Kid

jose



                                                                 Disease: Stage 

II



                                                                 Mild Decrease i

n GFR



                                                                 60-90 Stage III



                                                                 Moderate Decrea

se in



                                                                 GFR 30-59 Stage

 IV



                                                                 Severe Decrease

 in



                                                                 GFR 15-29 Stage

 V



                                                                 Kidney Failure 

<15

 

             CREATININE (test code = 1.7 mg/dL    0.5-1.0      H            



             CREAT)                                              

 

             TOTAL PROTEIN (test 6.9 gm/dL    6.3-8.2                   



             code = PROT)                                        

 

             ALBUMIN (test code = 3.8 gm/dL    3.4-4.8                   



             ALB)                                                

 

             GLOBULIN (test code = 3.1 g/dL     2.2-4.2      N            



             GLOB)                                               

 

             ALBUMIN/GLOBULIN RATIO 1.23                                   



             (test code = A/G)                                        

 

             CALCIUM (test code = 9.2 mg/dL    8.4-10.2                  



             CA)                                                 

 

             BILIRUBIN TOTAL (test 0.3 mg/dL    0.2-1.0                   



             code = BILT)                                        

 

             SGOT/AST (test code = 26 units/L   15-37                     



             AST)                                                

 

             SGPT/ALT (test code = 27 units/L   12-78                     



             ALT)                                                

 

             ALKALINE PHOSPHATASE 61 units/L                       



             TOTAL (test code =                                        



             ALKP)                                               



[id number] 20:08:00





             Test Item    Value        Reference Range Interpretation Comments

 

             POTASSIUM (test code = K) 4.4 mEq/L    3.5-5.0      N            



COMPREHENSIVE METABOLIC [id number] 20:08:00





             Test Item    Value        Reference Range Interpretation Comments

 

             SODIUM (test code = NA) 144 mEq/L    135-145                   

 

             POTASSIUM (test code = 4.4 mEq/L    3.5-5.0                   



             K)                                                  

 

             CHLORIDE (test code = 107 mEq/L    100-115                   



             CL)                                                 

 

             CARBON DIOXIDE (test 24 mEq/L     22-31                     



             code = CO2)                                         

 

             GLUCOSE (test code = 138 mg/dL           H            



             GLU)                                                

 

             BLOOD UREA NITROGEN 32 mg/dL     7-18         H            



             (test code = BUN)                                        

 

             GLOMERULAR FILTRATION 29.8         >60                       Unit o

f measure:



             RATE (test code = GFR)                                        mL/mi

n/1.73



                                                                 [id number]



                                                                 Range:Healthy A

dults



                                                                 >90 mL/min/1.73

 m2



                                                                 For Chronic Kid

jose



                                                                 Disease: Stage 

II



                                                                 Mild Decrease i

n GFR



                                                                 60-90 Stage III



                                                                 Moderate Decrea

se in



                                                                 GFR 30-59 Stage

 IV



                                                                 Severe Decrease

 in



                                                                 GFR 15-29 Stage

 V



                                                                 Kidney Failure



                                                                 <15Unit of carmen

ure:



                                                                 mL/min/1.73



                                                                 [id number]



                                                                 Range:Healthy A

dults



                                                                  >90 mL/min/1.7

3 m2



                                                                 For Chronic Kid

jose



                                                                 Disease: Stage 

II



                                                                 Mild Decrease i

n GFR



                                                                 60-90 Stage III



                                                                 Moderate Decrea

se in



                                                                 GFR 30-59 Stage

 IV



                                                                 Severe Decrease

 in



                                                                 GFR 15-29 Stage

 V



                                                                 Kidney Failure 

<15

 

             CREATININE (test code = 1.7 mg/dL    0.5-1.0      H            



             CREAT)                                              

 

             TOTAL PROTEIN (test 6.9 gm/dL    6.3-8.2                   



             code = PROT)                                        

 

             ALBUMIN (test code =  gm/dL       3.4-4.8                   



             ALB)                                                

 

             GLOBULIN (test code = 3.1 g/dL     2.2-4.2      N            



             GLOB)                                               

 

             ALBUMIN/GLOBULIN RATIO 1.23                                   



             (test code = A/G)                                        

 

             CALCIUM (test code = 9.2 mg/dL    8.4-10.2                  



             CA)                                                 

 

             BILIRUBIN TOTAL (test  mg/dL       0.2-1.0                   



             code = BILT)                                        

 

             SGOT/AST (test code = 26 units/L   15-37                     



             AST)                                                

 

             SGPT/ALT (test code = 27 units/L   12-78                     



             ALT)                                                

 

             ALKALINE PHOSPHATASE 61 units/L                       



             TOTAL (test code =                                        



             ALKP)                                               



[id number] 20:08:00





             Test Item    Value        Reference Range Interpretation Comments

 

             CHLORIDE (test code = CL) 107 mEq/L    100-115      N            



CARBON [id number] 20:08:00





             Test Item    Value        Reference Range Interpretation Comments

 

             CARBON DIOXIDE (test code = CO2) 24 mEq/L     22-31        N       

     



[id number] 20:08:00





             Test Item    Value        Reference Range Interpretation Comments

 

             GLUCOSE (test code = GLU) 138 mg/dL           H            



BLOOD UREA [id number] 20:08:00





             Test Item    Value        Reference Range Interpretation Comments

 

             BLOOD UREA NITROGEN (test code = 32 mg/dL     7-18         H       

     



             BUN)                                                



GLOMERULAR FILTRATION [id number] 20:08:00





             Test Item    Value        Reference Range Interpretation Comments

 

             GLOMERULAR FILTRATION RATE (test 30 ml/min    >60          L       

     



             code = GFR)                                         



[id number] 20:08:00





             Test Item    Value        Reference Range Interpretation Comments

 

             CREATININE (test code = CREAT) 1.7 mg/dL    0.5-1.0      H         

   



TOTAL [id number] 20:08:00





             Test Item    Value        Reference Range Interpretation Comments

 

             TOTAL PROTEIN (test code = PROT) 6.9 gm/dL    6.3-8.2      N       

     



[id number] 20:08:00





             Test Item    Value        Reference Range Interpretation Comments

 

             ALBUMIN (test code = ALB) 3.8 gm/dL    3.4-4.8      N            



[id number] 20:08:00





             Test Item    Value        Reference Range Interpretation Comments

 

             CALCIUM (test code = CA) 9.2 mg/dL    8.4-10.2     N            



BILIRUBIN [id number] 20:08:00





             Test Item    Value        Reference Range Interpretation Comments

 

             BILIRUBIN TOTAL (test code = BILT) 0.3 mg/dL    0.2-1.0      N     

       



[id number] 20:08:00





             Test Item    Value        Reference Range Interpretation Comments

 

             SGOT/AST (test code = AST) 26 units/L   15-37        N            



[id number] 20:08:00





             Test Item    Value        Reference Range Interpretation Comments

 

             SGPT/ALT (test code = ALT) 27 units/L   12-78        N            



ALKALINE PHOSPHATASE [id number] 20:08:00





             Test Item    Value        Reference Range Interpretation Comments

 

             ALKALINE PHOSPHATASE TOTAL (test 61 units/L          N       

     



             code = ALKP)                                        



[id number] 20:08:00





             Test Item    Value        Reference Range Interpretation Comments

 

             SODIUM (test code = NA) 144 mEq/L    135-145      N            



PROTHROMBIN [id number] 19:42:00





             Test Item    Value        Reference Range Interpretation Comments

 

             PROTHROMBIN TIME 14.7 secs    10.1-12.5    H            



             PATIENT (test code =                                        



             PTP)                                                

 

             INTERNATIONAL NORMAL 1.30         <2.0                      RECOMME

NDED THERAPEUTIC



             RATIO (test code =                                        RANGE FOR

 ORAL



             INR)                                                ANTICOAGULANTTR

EATMENT:



                                                                 CONDITION INRPr

ophylaxis



                                                                 of venous throm

bosis in



                                                                 2.0 - 3.0 high-

risk



                                                                 medical or surg

ical



                                                                 patientsTreatme

nt of



                                                                 venous thrombos

is  2.0 -



                                                                 3.0Prevention o

f



                                                                 embolism 2.0 -



                                                                 3.0Prevention o

f



                                                                 recurrent embol

ism, or



                                                                 3.0 - 4.5 patie

nts with



                                                                 mechanical pros

thetic



                                                                 intravascular v

chavez



IS PATIENT ON ANTICOAGULANTS ? YLIST ANTICOAGULANT/ANTI PLT MEDICATION : 
AspirinHas Lab been notified if Patient is on Heparin Drip? NOIf Yes, order CBC,
 OCCULT BLOOD, PT every other day NLIST ANTICOAGULANT/ANTI PLT MEDICATION : 
OtherTHROMBOPLASTIN TIME [id number] 19:42:00





             Test Item    Value        Reference Range Interpretation Comments

 

             PTT ACTIVATED (test code = APTT) 32.5 secs    24.9-37.0    N       

     



IS PATIENT ON ANTICOAGULANTS ? YLIST ANTICOAGULANT/ANTI PLT MEDICATION : 
AspirinHas Lab been notified if Patient is on Heparin Drip? NOIf Yes, order CBC,
 OCCULT BLOOD, PT every other day NLIST ANTICOAGULANT/ANTI PLT MEDICATION : 
OtherCBC W/AUTO [id number] 18:38:00





             Test Item    Value        Reference Range Interpretation Comments

 

             WHITE BLOOD CELL (test code = WBC) 6.2 K/mm3    5.8-11.0     N     

       

 

             RED BLOOD CELL (test code = RBC) 3.88 M/mm3   4.2-5.4      L       

     

 

             HEMOGLOBIN (test code = HGB) 11.2 g/dL    12-16        L           

 

 

             HEMATOCRIT (test code = HCT) 35.3 %       37-47        L           

 

 

             MEAN CELL VOLUME (test code = MCV) 91 fL        80-98        N     

       

 

             MEAN CELL HGB (test code = MCH) 28.9 pg      27-34        N        

    

 

             MEAN CELL HGB CONCENTRATION (test 31.7 g/dL    30.8-34.1    N      

      



             code = MCHC)                                        

 

             RED CELL DISTRIBUTION WIDTH (test 15.2 %       11-16        N      

      



             code = RDW)                                         

 

             PLT (test code = PLT) 439 K/mm3    130-400      H            

 

             MEAN PLATELET VOLUME (test code = 10.3 fL      8.9-12.1     N      

      



             MPV)                                                

 

             NEUTROPHIL % (test code = NT%) 62.7 %       45-70        N         

   

 

             LYMPHOCYTE % (test code = LY%) 23.2 %       20-40        N         

   

 

             MONOCYTE % (test code = MO%) 8.7 %        3-10         N           

 

 

             EOSINOPHIL % (test code = EO%) 2.7 %        1-5          N         

   

 

             BASOPHIL % (test code = BA%) 0.8 %        0.0-1.1      N           

 

 

             NEUTROPHIL # (test code = NT#) 3.91 K/mm3   2.00-7.50    N         

   

 

             LYMPHOCYTE # (test code = LY#) 1.45 K/mm3   1.50-4.00    L         

   

 

             MONOCYTE # (test code = MO#) 0.54 K/mm3   0.2-0.8      N           

 

 

             EOSINOPHIL # (test code = EO#) 0.17 K/mm3   0.04-0.4     N         

   

 

             BASOPHIL # (test code = BA#) 0.05 K/mm3   0.02-0.10    N           

 

 

             MANUAL DIFF REQUIRED (test code = NO           MANUAL DIFF         

      



             MDIFF)                                              

 

             NUCLEATED RED BLOOD CELL (test 0 %          0-0          N         

   



             code = NRBC)                                        



- USG NDL PLACEMENT (Bxg/Asp)2019 10:33:00 Patient Name: MARIA G ARTEAGA Unit No: [id number] EXAMS: CPT CODE: [id number] USG NDL PLACEMENT 
(Bxg/Asp) 40265 Ultrasound-guided left knee aspiration COMMENT: After informed 
consent was obtained a 22-gauge needle is inserted into the suprapatellar joint 
effusion under sonographic guidance using sterile technique. 6 mL fluid is 
aspirated. This is sent to the lab for analysis. Patient toleratedthe procedure 
well. ** Electronically Signed by ENEDELIA Sunshine MD on 2019 at 1033 ** 
Reported and signed by: ENEDELIA Sunshine MD  CC: Toan Renae MD Technologist: 
ZARINA MOCTEZUMA RDMS, RVT Transcribed D/T: 2019 (1033) t.SDR.GVG [facility] NAME: MARIA G ARTEAGA [address] [facility] PHYS: Toan Malin MD : 1950 AGE: 68 SEX: F [place] [address]  ACCT 
NO: [id number] LOC: Y.RAD PHONE #: [phone] EXAM DATE: 2019 STATUS: 
DEP CLI FAX #: [phone] RAD #: D/C DT  PAGE 1 Signed Report Patient Name: 
MARIA G ARTEAGA Unit No: [id number]:  CPT CODE: [id number] USG NDL 
PLACEMENT (Bxg/Asp) 49723 (Continued) Orig Print D/T: S: 2019 (1036)  [facility] NAME: MARIA G ARTEAGA [address] [facility] PHYS: 
Toan Malin MD : 1950 AGE: 68 SEX: F [place] [address] 
ACCT NO: [id number] LOC: Y.RAD PHONE #: [phone]  EXAM DATE: 2019 
STATUS: DEP CLI FAX #: [phone] RAD #: D/C DT PAGE 2 Signed ReportSYNOVIAL 
FLD CELL [id number] 17:32:00





             Test Item    Value        Reference Range Interpretation Comments

 

             SYNOVIAL FLD LIGHT YELLOW LT. YELLOW                



             COLOR (test code                                        



             = COLSY)                                            

 

             SYNOVIAL FLD CLOUDY       CLEAR        A            



             APPEARANCE (test                                        



             code = APPSY)                                        

 

             SYNOVIAL FLD 1 mL                                   



             VOLUME (test code                                        



             = VOLSY)                                            

 

             SYNOVIAL FLD WBC 2984.000 /MM3 0-200        H            



             (test code =                                        



             WBCSY)                                              

 

             SYNOVIAL FLD RBC 2000.000 /mm3 0-2          H            NOTE: An a

utomated



             (test code =                                        method is now b

eing used



             RBCSY)                                              to determinesyn

ovial



                                                                 fluid WBC and R

BC



                                                                 counts. The dif

ferential



                                                                 willstill be pe

rformed



                                                                 manually.

 

             SYNOVIAL FLD POLY 90 %         0-25         H            



             (test code =                                        



             POLYSY)                                             

 

             SYNOVIAL FLD 6 %          0-78         N            



             LYMPHOCYTE (test                                        



             code = LYMPHSY)                                        

 

             SYNOVIAL FLD 4 %          0-71         N            



             MONOCYTE (test                                        



             code = MONOSY)                                        



SPECIMEN COMMENT: ASP.LT.KNEESYNOVIAL FLD CELL [id number] 17:31:00





             Test Item    Value        Reference Range Interpretation Comments

 

             SYNOVIAL FLD COLOR              LT. YELLOW                



             (test code =                                        



             COLSY)                                              

 

             SYNOVIAL FLD              CLEAR                     



             APPEARANCE (test                                        



             code = APPSY)                                        

 

             SYNOVIAL FLD  mL                                    



             VOLUME (test code                                        



             = VOLSY)                                            

 

             SYNOVIAL FLD WBC 2984.000     0-200        H            



             (test code = /MM3                                   



             WBCSY)                                              

 

             SYNOVIAL FLD RBC 2000.000     0-2          H            NOTE: An au

tomated



             (test code = /mm3                                   method is now b

eing used



             RBCSY)                                              to determinesyn

ovial



                                                                 fluid WBC and R

BC



                                                                 counts. The dif

ferential



                                                                 willstill be pe

rformed



                                                                 manually.



SPECIMEN COMMENT: ASP.LT.KNEECBC W/AUTO [id number] 17:02:00





             Test Item    Value        Reference Range Interpretation Comments

 

             WHITE BLOOD CELL (test code = WBC) 5.5 K/mm3    5.8-11.0     L     

       

 

             RED BLOOD CELL (test code = RBC) 3.71 M/mm3   4.2-5.4      L       

     

 

             HEMOGLOBIN (test code = HGB) 10.6 g/dL    12-16        L           

 

 

             HEMATOCRIT (test code = HCT) 32.8 %       37-47        L           

 

 

             MEAN CELL VOLUME (test code = MCV) 88 fL        80-98        N     

       

 

             MEAN CELL HGB (test code = MCH) 28.6 pg      27-34        N        

    

 

             MEAN CELL HGB CONCENTRATION (test 32.3 g/dL    30.8-34.1    N      

      



             code = MCHC)                                        

 

             RED CELL DISTRIBUTION WIDTH (test 15.4 %       11-16        N      

      



             code = RDW)                                         

 

             PLT (test code = PLT) 416 K/mm3    130-400      H            

 

             MEAN PLATELET VOLUME (test code = 10.2 fL      8.9-12.1     N      

      



             MPV)                                                

 

             NEUTROPHIL % (test code = NT%) 61.8 %       45-70        N         

   

 

             LYMPHOCYTE % (test code = LY%) 25.5 %       20-40        N         

   

 

             MONOCYTE % (test code = MO%) 7.7 %        3-10         N           

 

 

             EOSINOPHIL % (test code = EO%) 2.6 %        1-5          N         

   

 

             BASOPHIL % (test code = BA%) 0.9 %        0.0-1.1      N           

 

 

             NEUTROPHIL # (test code = NT#) 3.40 K/mm3   2.00-7.50    N         

   

 

             LYMPHOCYTE # (test code = LY#) 1.40 K/mm3   1.50-4.00    L         

   

 

             MONOCYTE # (test code = MO#) 0.42 K/mm3   0.2-0.8      N           

 

 

             EOSINOPHIL # (test code = EO#) 0.14 K/mm3   0.04-0.4     N         

   

 

             BASOPHIL # (test code = BA#) 0.05 K/mm3   0.02-0.10    N           

 

 

             MANUAL DIFF REQUIRED (test code = NO           MANUAL DIFF         

      



             MDIFF)                                              

 

             NUCLEATED RED BLOOD CELL (test 0 %          0-0          N         

   



             code = NRBC)                                        



SED [id number] 17:02:00





             Test Item    Value        Reference Range Interpretation Comments

 

             SED RATE (test code = SEDW) 53 mm/hr     0-20         H            



C REACTIVE [id number] 16:05:00





             Test Item    Value        Reference Range Interpretation Comments

 

             C REACTIVE PROTEIN (test code = 3.0 mg/dL    <0.9                  

    



             CRP)                                                



CBC W/AUTO [id number] 15:50:00





             Test Item    Value        Reference Range Interpretation Comments

 

             WHITE BLOOD CELL (test code = WBC) 5.5 K/mm3    5.8-11.0     L     

       

 

             RED BLOOD CELL (test code = RBC) 3.71 M/mm3   4.2-5.4      L       

     

 

             HEMOGLOBIN (test code = HGB) 10.6 g/dL    12-16        L           

 

 

             HEMATOCRIT (test code = HCT) 32.8 %       37-47        L           

 

 

             MEAN CELL VOLUME (test code = MCV) 88 fL        80-98        N     

       

 

             MEAN CELL HGB (test code = MCH) 28.6 pg      27-34        N        

    

 

             MEAN CELL HGB CONCENTRATION (test 32.3 g/dL    30.8-34.1    N      

      



             code = MCHC)                                        

 

             RED CELL DISTRIBUTION WIDTH (test 15.4 %       11-16        N      

      



             code = RDW)                                         

 

             PLT (test code = PLT) 416 K/mm3    130-400      H            

 

             MEAN PLATELET VOLUME (test code = 10.2 fL      8.9-12.1     N      

      



             MPV)                                                

 

             NEUTROPHIL % (test code = NT%) 61.8 %       45-70        N         

   

 

             LYMPHOCYTE % (test code = LY%) 25.5 %       20-40        N         

   

 

             MONOCYTE % (test code = MO%) 7.7 %        3-10         N           

 

 

             EOSINOPHIL % (test code = EO%) 2.6 %        1-5          N         

   

 

             BASOPHIL % (test code = BA%) 0.9 %        0.0-1.1      N           

 

 

             NEUTROPHIL # (test code = NT#) 3.40 K/mm3   2.00-7.50    N         

   

 

             LYMPHOCYTE # (test code = LY#) 1.40 K/mm3   1.50-4.00    L         

   

 

             MONOCYTE # (test code = MO#) 0.42 K/mm3   0.2-0.8      N           

 

 

             EOSINOPHIL # (test code = EO#) 0.14 K/mm3   0.04-0.4     N         

   

 

             BASOPHIL # (test code = BA#) 0.05 K/mm3   0.02-0.10    N           

 

 

             MANUAL DIFF REQUIRED (test code = NO           MANUAL DIFF         

      



             MDIFF)                                              

 

             NUCLEATED RED BLOOD CELL (test 0 %          0-0          N         

   



             code = NRBC)                                        



SED [id number] 15:50:00





             Test Item    Value        Reference Range Interpretation Comments

 

             SED RATE (test code = SEDW)  mm/hr       0-20                      



- DUP VEIN [id number] 14:39:00 Patient Name: MARIA G ARTEAGA Unit 
No: [id number] EXAMS: CPT CODE: [id number] DUP VEIN UNI/LTD  18372 FINDINGS: Left
 lower extremity grayscale and Doppler venous ultrasound demonstrates normal fl
ow and luminal compressibility without evidence of intraluminal thrombus. 
IMPRESSION: No evidence ofDVT within deep venous structures of the left lower 
extremity.  ** Electronically Signed by DAVID Love ** ** on 2019
 at 1437 ** Reported and signed by: Tor Love M.D. CC: Marcelo Renae MD
  Technologist: ZARINA MOCTEZUMA RDMS, RVT Transcribed D/T: 2019 (2225) 
[place] [facility]  NAME: MARIA G ARTEAGA [address] [facility] PHYS: Toan Malin : 1950 AGE: 68 SEX: F [place], [place] [address] ACCT NO: [id number] LOC: YJanelleRAD PHONE #: [phone] EXAM DATE: 
2019 STATUS: DEP CLI FAX #: [phone] RAD #:  D/C DT PAGE 1 Signed 
Report Patient Name: MARIA G ARTEAGA Unit No: [id number] EXAMS:  CPT 
CODE: [id number] DUP VEIN UNI/LTD 09162 (Continued) Orig Print D/T: S: 2019
 (1442)  [facility] NAME: MARIA G ARTEAGA [address] [facility]  PHYS: Toan Malin MD : 1950 AGE: 68 SEX: F [place], [place] [address] ACCT NO: [id number] LOC: [facility] PHONE #: [phone] EXAM DATE: 
2019 STATUS: DEP CLI FAX #: [phone] RAD #: D/C DT PAGE 2 Signed 
Report



Notes







                Date/Time       Note            Provider        Source

 

                2022-10-04 08:23:00-00:00                                 [facility] ([facility])               

  



                                DT Operative Note                 



                                REPORT#:[id number] REPORT STATUS: Signed         

        



                                DATE:10/04/22 TIME: 823                 



                                                                



                                PATIENT: MARIA G ARTEAGA UNIT #: [id number]

80                 



                                ACCOUNT#: [id number] ROOM/BED:                

 



                                : 49 AGE: 72 SEX: F ATTEND: Pravin Herbert MD                 



                                ADM DT: 10/04/22 AUTHOR: Pravin Herbert MD         

        



                                                                



                                * ALL edits or amendments must be made on the [employer] document *                 



                                                                



                                 **See Addendum**                 



                                Operative Report                 



                                                                



                                Operative Note                  



                                Note:                           



                                DATE OF SERVICE: 10/4/22                 



                                PREOPERATIVE DIAGNOSIS:1. left carpal tunnel syn

drome                 



                                2. left cubital tunnel syndrome                 



                                POSTOPERATIVE DIAGNOSIS: 1. left carpal tunnel s

yndrome                 



                                2. left cubital tunnel syndrome                 



                                OPERATION PERFORMED: 1. left endoscopic carpal t

unnel release                 



                                2. left ulnar nerve in situ release             

    



                                SURGEON: Pravin Herbert                 



                                ASSISTANT SURGEON: Chandler Tian                 



                                NEED FOR ASSISTANT: The skilled assistance of Juvencio Tian PA-C was necessary                 



                                                    during this case. He assiste

d with every aspect of the operation including, but

                                                    



                                        not limited to, proper and safe position

ing of the patient, obtaining adequate 

                                        



                                surgical exposure, retraction of the ulnar nerve

, the continual process of                 



                                        hemostasis during the procedure, and najma

gical wound closure and removal of the 

                                        



                                                    patient from the operating t

able and returning the patient back to the [facility]. His assistance allowed me to perform the

 most sensitive and technical                 



                                portions of this operation using 2 hands, thus e

nhancing patient safety. This                 



                                                    would not be possible withou

t the help of a skilled assistant familiar with the

                                                    



                                procedure and capable of safely performing the a

forementioned tasks. Our                 



                                facility is not a teaching hospital, and as such

, no surgical residents or                 



                                interns were available to assist.               

  



                                ANESTHESIA: general anesthesia                 



                                COMPLICATIONS: None.                 



                                ESTIMATED BLOOD LOSS: 5 ml                 



                                TOURNIQUET TIME: 25 minutes at 250 mmHg         

        



                                        FINDINGS: Ulnar nerve was significant co

mpressed at the cubital tunnel with an 

                                        



                                hour glass lesion noted. There was complete rele

ase of the transverse carpal                 



                                ligament.                       



                                INDICATION FOR PROCEDURE:                 



                                        This is a 72 year old female with left c

arpal and cubital tunnel syndrome that 

                                        



                                has been recalcitrant to conservative treatment 

options. Patient wishes to                 



                                proceed with surgical release. Risks, benefits, 

and alternatives to the                 



                                procedure were discussed with the patient in det

ail preoperatively. Risks                 



                                include, but not limited to, infection, damage t

o blood vessels or nerves,                 



                                stiffness of fingers, thumb, and wrist, and need

 for further surgery.                 



                                        Additionally, there is a chance that the

 procedure may be converted to an open 

                                        



                                                    procedure if there is inadeq

uate visualization. There is also a chance that the

                                                    



                                                    ulnar nerve may have to be m

wicho to a different position. The patient 

verbalized                                          



                                understanding of these risk and consented to pro

ceed.                 



                                PROCEDURE IN DETAIL:                 



                                        Patient was identified in the pre-operat

juan holding area. The patient was then 

                                        



                                        brought to the Operating Room where a fo

rmal time-out was performed to confirm 

                                        



                                the correct patient, site, and planned operation

. General anesthesia was                 



                                provided by the anesthesia team. A well-padded t

ourniquet was placed on the                 



                                upper arm. The patient's arm was prepped and ramiro

ped in the usual sterile                 



                                fashion. The hand was then exsanguinated and florencia

rniquet inflated. No perio-                 



                                operative antibiotics was given.                

 



                                An approximately 1 cm transverse incision was th

en made between the two volar                 



                                                    wrist creases. The palmaris 

tendon was identified and the underlying fascia was

                                                    



                                                    opened with a tenotomy sciss

or. A spatula was then inserted deep to the leading

                                                    



                                edge of the transverse carpal ligament to clean 

the tenosynovium off its                 



                                                    undersurface. Sequential dil

ators were placed into the carpal tunnel hugging 

the                                                 



                                                    tomer of miki. I inserted t

he endoscope from the proximal end. I had excellent

                                                    



                                                    visualization of the proxima

l and distal transverse carpal ligament. I released

                                                    



                                                    the ligament from distal to 

proximal. I was able to identify both leaflets 

after                                               



                                the release, confirming complete release. Proxim

ally, the forearm fascia was                 



                                released under direct visualization using tenoto

my scissors. All instruments                 



                                were then withdrawn from the wrist. The proximal

 incision was closed with a                 



                                subcutaneous monocryl 4-0 suture and dermabond w

as applied to the incisions.                 



                                        I turned my attention to the elbow. I be

jessica with a longitudinal incision along 

                                        



                                the course of the nerve just posterior to the me

dial epicondyle. I dissected                 



                                carefully through skin and subcutaneous tissue. 

The medial antebrachial                 



                                cutaneous nerve branches were carefully dissecte

d free and vessel loop was                 



                                                    placed around to protect the

se branches. The nerve was identified proximally 

and                                                 



                                a vessel loop placed around the nerve. I began t

o release the nerve from                 



                                proximally to distally, starting with the fascia

l bands of the arcade of                 



                                [place]. Proceeding distally the fascia of the

 cubital tunnel was released                 



                                        with Payne's ligament. Finally, the fa

scia over the two heads of the FCU was 

                                        



                                                    incised. I made sure there w

as no compression of the ulnar nerve proximally nor

                                                    



                                                    distally. The subcutaneous a

nd dermal layer was closed with 2-0 vicryl sutures.

                                                    



                                                    A running 3-0 monocryl subcu

ticular stitch was then placed followed by 

dermabond                                           



                                                    on the skin. A bulky soft dr

essing was then placed and tourniquet was deflated.

                                                    



                                Patient was then reversed out of anesthesia and 

transferred to recovery room                 



                                without any incident.                 



                                Pravin Herbert                       



                                                                



                                                                



                                Electronically Signed by Pravin Herbert MD on 10/04

/22 at 0824                 



                                                                



                                Addendum 1: 10/04/22 1139 by Pravin Herbert MD     

            



                                                                



                                                                



                                                                



                                Electronically Signed by Pravin Herbert MD on 10/04

/22 at 1140                 



                                                                



                                [facility] #:[phone]                 



                                ***END OF REPORT***                 



                                                                



                                                                

 

                2022 17:05:00-00:00 [id number] [place] [address]                 



                                                                



                                                                



                                PATIENT NAME: MARIA G ARTEAGA ADMIT DATE:

                 



                                ACCOUNT NO: [id number] ROOM NO:               

  



                                MEDICAL RECORD NO: [id number]  AGE: 71          

       



                                REPORT TYPE: ELECTROCARDIOGRAM SEX: F           

      



                                                                



                                ADMITTING PHYSICIAN:                 



                                ATTENDING PHYSICIAN:Pravin Herbert MD              

   



                                                                



                                                                



                                Order:                          



                                [id number]                   



                                Test Reason : PREOP CLEARANCE AGE>50            

     



                                 Test Date/Time Stamp:                 



                                Mon Sep 19 2022 17:05:46                 



                                Blood Pressure : ***/*** mmHG                 



                                Vent. Rate : 073 BPM Atrial Rate : 000 BPM      

           



                                 P-R Int : 000 ms QRS Dur : 170 ms              

   



                                 QT Int : 466 ms P-R-T Axes : 000 -65 078 degree

s                 



                                 QTc Int : 513 ms                 



                                                                



                                atrial pacing with premature atrial beats       

          



                                Left axis deviation                 



                                Left bundle branch block                 



                                Abnormal ECG                    



                                When compared with ECG of 2017 16:44,    

             



                                Atrial fibrillation has replaced Sinus rhythm   

              



                                Left bundle branch block is now present         

        



                                Confirmed by BETINA BENAVIDES MD ([id number]) on 2022 

12:47:34 PM                 



                                                                



                                Referred By: Pravin Herbert Confirmed by:BETINA BENAVIDES MD

                 



                                                                



                                                                



                                                                



                                                                



                                                                



                                                                



                                                                



                                                                



                                                                



                                                                



                                                                



                                                                



                                                                



                                                                



                                                                



                                                                



                                                                



                                                                



                                                                



                                                                



                                                                



                                                                



                                PATIENT NAME: MARIA G ARTEAGA ACCOUNT #: 

[id number]                 

 

                2019 16:01:00-00:00 [id number] [place] [address]                 



                                                                



                                                                



                                PATIENT NAME: MARIA G ARTEAGA ADMIT DATE:

 19                 



                                ACCOUNT NO: [id number]  ROOM NO: Y.506        

         



                                MEDICAL RECORD NO: [id number] AGE: 68           

      



                                REPORT TYPE: DISCHARGE SUMMARY REPORT SEX: F    

             



                                                                



                                ADMITTING PHYSICIAN:Toan Renae MD         

        



                                ATTENDING PHYSICIAN:Toan Renae MD         

        



                                                                



                                                                



                                ADMISSION DATE: 2019                 



                                DISCHARGE DATE: 2019                 



                                                                



                                ADMITTING DIAGNOSIS: Infected left total knee ar

throplasty.                 



                                                                



                                                    DISCHARGE DIAGNOSIS: Status 

post incision and drainage, poly change, left knee.

                                                    



                                                                



                                OPERATIONS AND PROCEDURES: 2019, I and D w

ith polyethylene change, left                 



                                knee by Dr. Taon Renae.                 



                                                                



                                        HOSPITAL COURSE: Above-listed procedure 

under general anesthesia with 25 mL of 

                                        



                                blood loss, no complications. Minimal findings o

f acute infection at the time                 



                                        of surgery. Infectious disease consultat

ion was obtained. PICC line was placed 

                                        



                                on the first postoperative day. Decision was mad

e to complete a course of 2                 



                                weeks of intravenous antibiotics. The patient wa

s mobilized with physical                 



                                        therapy, weightbearing as tolerated. Hem

oglobin 9.9 on the first postoperative 

                                        



                                day. Infectious disease service recommended disc

harge with ceftriaxone and                 



                                daptomycin. Arrangements were made with home hea

lt for administration of IV                 



                                antibiotics.                    



                                                                



                                DISCHARGE INSTRUCTIONS: Activity is weightbearin

g as tolerated.                 



                                                                



                                DISCHARGE MEDICATIONS: Aspirin 81 mg twice daily

 for 4 weeks for DVT                 



                                        prophylaxis and intravenous antibiotics 

as per recommendations from infectious 

                                        



                                disease service including ceftriaxone and daptom

ycin.                 



                                                                



                                DIET: Usual.                    



                                                                



                                FOLLOWUP PLANS: With Dr. Toan Renae in 10 da

ys.                 



                                                                



                                CONDITION: Stable.                 



                                                                



                                DISPOSITION: Discharged to home with home health

 services.                 



                                                                



                                Dictated By: Toan Renae MD               

  



                                                                



                                WT: [id number]/MINERVA                 



                                DD: 2019 16:01:30                 



                                DT: 2019 21:27:52                 



                                Conf#: [id number]/DID#: [id number]                 



                                                                



                                                                



                                                                



                                PATIENT NAME: MARIA G ARTEAGA ACCOUNT #: 

[id number]                 



                                                                



                                                                



                                                                



                                                                



                                Authenticated by Toan Renae MD On  09:42:35 AM                 



                                                                



                                                                



                                                                



                                                                



                                                                



                                Electronically Signed by Toan Renae MD on

 19 at 0942                 



                                                                



                                                                



                                                                



                                                                



                                                                



                                                                



                                                                



                                                                



                                                                



                                                                



                                                                



                                                                



                                                                



                                                                



                                                                



                                                                



                                                                



                                                                



                                                                



                                                                



                                                                



                                                                



                                                                



                                                                



                                                                



                                                                



                                                                



                                                                



                                                                



                                                                



                                                                



                                                                



                                                                



                                                                



                                                                



                                                                



                                                                



                                                                



                                                                



                                                                



                                                                



                                                                



                                                                



                                                                



                                                                



                                                                



                                                                



                                PATIENT NAME: MARIA G ARTEAGA ACCOUNT #: 

[id number]                 

 

                2019 17:29:00-00:00                                 [facility] ([facility])               

  



                                Clinical Note                   



                                REPORT#:[id number] REPORT STATUS: Signed         

        



                                DATE:19 TIME: 172                 



                                                                



                                PATIENT: MARIA G ARTEAGA UNIT #: [id number]

80                 



                                ACCOUNT#: [id number] ROOM/BED: [address]        

         



                                : [birth year] AGE: 68 SEX: F ATTEND: Fidel Renae MD                 



                                ADM DT: 19 AUTHOR: Toan Renae MD    

             



                                                                



                                * ALL edits or amendments must be made on the el

[employer]/computer document *                 



                                                                



                                                                



                                Clinical Note                   



                                Note:                           



                                POD #2                          



                                                                



                                Feels OK. Amb in diego. Pain controled. PICC line

 placed yesterday.                 



                                                                



                                PE:                             



                                                                



                                Drsg D/I. No erythema.                 



                                                                



                                Last Documented:                 



                                 Result Date Time                 



                                 Pulse Ox 94  1630                 



                                 FiO2 32  1630                 



                                 O2 Delivery Nasal cannula  1630           

      



                                 O2 Flow Rate 3.625779  1630               

  



                                 B/P 132/77  1616                 



                                 B/P Mean 95.1  1616                 



                                 Temp  98.6  1616                 



                                 Pulse 62  1616                 



                                 Resp 18  1616                 



                                                                



                                Home. OK with 2 weeks IV abx.                 



                                                                



                                Electronically Signed by Toan Renae MD on 

19 at 1731                 



                                                                



                                RPT #:[id number]                 



                                ***END OF REPORT***                 



                                                                



                                                                

 

                2019 09:57:00-00:00                                 [facility] ([facility])               

  



                                Infectious Dis. Progress Note                 



                                REPORT#:[id number] REPORT STATUS: Signed         

        



                                DATE:19 TIME: 957                 



                                                                



                                PATIENT: MARIA G ARTEAGA UNIT #: [id number]

80                 



                                ACCOUNT#: [id number] ROOM/BED: [address]        

         



                                : 49 AGE: 68 SEX: F ATTEND: Fidel Renae MD                 



                                ADM DT: 19 AUTHOR: Mary Helm MD 

                



                                                                



                                * ALL edits or amendments must be made on the [employer]/computer document *                 



                                                                



                                                                



                                Subjective                      



                                Chief Complaint:                 



                                L knee pain and swelling                 



                                Patient reports:                 



                                Yes: pain controlled. No: nausea, vomiting.     

            



                                Comments:                       



                                Sitting up on edge of bed. Reports no fatigue. P

ain is controlled though she                 



                                        does have discomfort with ambulation. PI

CC placed yesterday without event. She 

                                        



                                                    is waiting on call back from

 [place] to set up teaching for home IV prior to 

DC.                                                 



                                                                



                                Objective                       



                                                                



                                General                         



                                VS/I O:                         



                                Last Documented:                 



                                 Result Date Time                 



                                 Pulse Ox 97  0742                 



                                  B/P 149/76  0742                 



                                 B/P Mean 100.1  0742                 



                                 O2 Delivery Nasal cannula  07           

      



                                  Temp 36.9  0742                 



                                 Pulse 68  0742                 



                                 Resp 18  0742                 



                                  FiO2 32  0008                 



                                 O2 Flow Rate 3.577326  0008               

  



                                                                



                                Vital Signs                     



                                 Date Temp Pulse Resp B/P B/P Mean Pulse Ox FiO2

                 



                                 09/10- 36.3-36.9 63-74 16-18 /44-88 

61.1-110.6 95-99 32                 



                                                                



                                24 hour I O ending at 0700:                 



                                  0700 09/10 1900                 



                                 Intake Total 440.00                 



                                 Output Total                   



                                 Balance 440.00                 



                                                                



                                 Intake, .00                 



                                  Intake, Oral 240                 



                                 Number Voids 3 2                 



                                                                



                                Patient Weight                  



                                                                



                                Weight (lb): 217                 



                                Weight (oz): 2.49                 



                                Weight (kg): 98.43                 



                                                                



                                Antibiotic start date:                 



                                Antibiotic: Ceftriaxone                 



                                Start Date: 9/10/19                 



                                                                



                                Antibiotic: Daptomycin                 



                                Start Date: 9/10/19                 



                                                                



                                                                



                                Physical Exam                   



                                General appearance: alert, awake, no acute distr

ess, pleasant, conversational                 



                                Wound/incision:                 



                                 Location:                      



                                LLE                             



                                 Site condition: dressing clean dry             

    



                                Head/Eyes: anicteric                 



                                Neck: full range of motion                 



                                Cardiovascular: normal heart sounds, regular rat

e rhythm                 



                                Respiratory: clear to auscultation (bilaterally)

                 



                                Abdomen: non-tender, normal bowel sounds, soft, 

no distention                 



                                Extremities: no edema (except trace L ankle)    

             



                                Neuro/CNS: alert, normal speech                 



                                Skin: no rash                   



                                                                



                                Results                         



                                Findings/Data:                  



                                Laboratory Tests                 



                                  09/11 09/11 09/10 09/10 09/10                 



                                 0600 0500 2046 1636 1134                 



                                 Chemistry                      



                                 POC Glucose (60 - 125 mg/dL) 113  97 189 H 135 

H                 



                                 Total Creatine Kinase (26 - 192 Units/L) 47    

             



                                                                



                                                                



                                Results:  Op cxs ngtd                 



                                                                



                                Treatment Prophylaxis                 



                                                                



                                Treatment Prophylaxis                 



                                Lines: PICC                     



                                CVC/PICC documentation:                 



                                                    CVC/PICC insertion date/time

: PICC single lumen Basilic vein Right Inserted 09/

                                                    



                                10/19 1400                      



                                                                



                                                                



                                                                



                                Diagnosis, Assessment Plan                 



                                Free Text A P:                  



                                67 yo woman with remote h/o L TKA admitted for p

resumed infection. Joint                 



                                        aspirate performed on  while she was

 on abx for diverticulitis was culture 

                                        



                                                    negative but with increased 

inflammatory markers suggestive of joint infection.

                                                    



                                She underwent I D with liner exchange on 19.

                 



                                                                



                                1) Operative findings were minimal compared to h

er initial sxs and aspirate                 



                                        results. Cultures thusfar remain ngtd. S

he was off po abx for 7+ days prior to 

                                        



                                surgery (was on Augmentin for diverticulitis). P

dimitri a short course on IV abx                 



                                with possible addition of po subsequently depend

ing on her clinical course.                 



                                PICC placed.                    



                                                                



                                2) Now CTX and Dapto. I am concerned about use o

f vancomycin in her given her                 



                                renal insufficiency. HH orders written. Baseline

 CPK normal.                 



                                                                



                                                                



                                Electronically Signed by Mary Helm MD 

on 19 at [address] [place] [address] #:[phone]                 



                                ***END OF REPORT***                 



                                                                



                                                                

 

                2019 08:44:00-00:00                                 [facility] ([facility])               

  



                                Internal Medicine Prog. Note                 



                                REPORT#:[id number] REPORT STATUS: Signed         

        



                                DATE:19 TIME: 844                 



                                                                



                                PATIENT: MARIA G ARTEAGA UNIT #: [id number]

80                 



                                ACCOUNT#: [id number] [facility]        

         



                                : 49 AGE: 68 SEX: F ATTEND: Fidel Renae MD                 



                                ADM DT: 19 AUTHOR: Marco Antonio Vasquez MD       

          



                                                                



                                * ALL edits or amendments must be made on the [employer] document *                 



                                                                



                                                                



                                Subjective                      



                                        Patient reports: no complaints, no abdom

inal pain, no chest pain, no diarrhea, 

                                        



                                no dizziness (when getting OOB), no heartburn, n

o nausea, no palpitation, no                 



                                shortness of breath, no vomiting, feeling better

, pain controlled                 



                                                                



                                Objective                       



                                                                



                                General                         



                                VS/I O:                         



                                Vital Signs                     



                                 Date Temp Pulse Resp B/P B/P Mean  Pulse Ox FiO

2                 



                                 09/10- 97.3-98.4 63-74 16-18 /44-88 

61.1-110.6 95-99 32                 



                                                                



                                Last Documented:                 



                                 Result Date Time                 



                                  Pulse Ox 97  0742                 



                                 B/P 149/76 0742                 



                                 B/P Mean 100.1  0742                 



                                  O2 Delivery Nasal cannula 742          

       



                                 Temp 98.4  07                 



                                 Pulse 68  0742                 



                                 Resp 18  0742                 



                                 FiO2 32  0008                 



                                 O2 Flow Rate 3.647141  0008               

  



                                                                



                                24 hour I O ending at 0700:                 



                                  0700 09/10 1900                 



                                 Intake Total 440.00                 



                                 Output Total                   



                                 Balance  440.00                 



                                                                



                                 Intake, .00                 



                                 Intake, Oral 240                 



                                 Number Voids 3 2                 



                                                                



                                Patient Weight                  



                                                                



                                Weight (lb): 217                 



                                Weight (oz): 2.49                 



                                Weight (kg): 98.43                 



                                                                



                                Post-op: day 2                  



                                                                



                                Physical Exam                   



                                General appearance: alert, awake, no acute distr

ess                 



                                Cardiovascular: normal heart sounds, regular rat

e rhythm, no murmur                 



                                Respiratory: clear to auscultation              

   



                                Abdomen: non-tender, normal bowel sounds, soft, 

no distention                 



                                Genitourinary: no phillips                 



                                Extremities:                    



                                 Extremities: no calf tenderness, no edema      

           



                                                                



                                Results                         



                                Findings/Data:                  



                                Laboratory Tests                 



                                 09/11 09/11 09/10 09/10 09/10                 



                                 0600 0500 2046 1636 1134                 



                                 Chemistry                      



                                 POC Glucose (60 - 125 mg/dL) 113 97 189 H 135 H

                 



                                 Total Creatine Kinase (26 - 192 Units/L) 47    

             



                                                                



                                                                



                                Results: labs reviewed                 



                                                                



                                Diagnosis, Assessment Plan                 



                                Problem List/A P:                 



                                 1. Type 2 diabetes mellitus without complicatio

ns                 



                                                                



                                 2. Essential (primary) hypertension            

     



                                                                



                                 3. Atrial fibrillation                 



                                                                



                                 4. SSS (sick sinus syndrome)                 



                                                                



                                 5. TEODORA (obstructive sleep apnea)               

  



                                                                



                                 6. Asthma                      



                                                                



                                 7. Infection of total left knee replacement    

             



                                                                



                                                                



                                Free Text DxA P Notes                 



                                Free Text DxA P Notes:                 



                                BS control is better. BP is mildly elevated this

 AM. Pt remains in a regular                 



                                cardiac rhythm. Continue current treatment. Cont

inue to mobilize.                 



                                                                



                                Electronically Signed by Marco Antonio Vasquez MD on  at 0933                 



                                                                



                                RPT #:[id number]                 



                                ***END OF REPORT***                 



                                                                



                                                                

 

                2019-09-10 19:03:00-00:00                                 [facility] ([facility])               

  



                                Internal Medicine Prog. Note                 



                                REPORT#:[id number] REPORT STATUS: Signed         

        



                                DATE:09/10/19 TIME:                  



                                                                



                                PATIENT: MARIA G ARTEAGA UNIT #: [id number]

80                 



                                ACCOUNT#: [id number] ROOM/BED: [address]        

         



                                : [birth year] AGE: 68 SEX: F ATTEND: Fidel Renae MD                 



                                ADM DT: 19 AUTHOR: Marco Antonio Vasquez MD       

          



                                                                



                                * ALL edits or amendments must be made on the [employer]/computer document *                 



                                                                



                                                                



                                Subjective                      



                                        Patient reports: no complaints, no abdom

inal pain, no chest pain, no diarrhea, 

                                        



                                no dizziness (when getting OOB), no heartburn, n

o nausea, no palpitation, no                 



                                shortness of breath, no vomiting, feeling better

, pain controlled                 



                                                                



                                Objective                       



                                                                



                                General                         



                                VS/I O:                         



                                Vital Signs                     



                                 Date Temp Pulse Resp B/P B/P Mean Pulse Ox FiO2

                 



                                 -09/10 97.7-98.1 63-83 16-18 /44-80 

61.1-100.3 95-98 32                 



                                                                



                                Last Documented:                 



                                 Result Date Time                 



                                 Pulse Ox 95 09/10 1630                 



                                 FiO2 32 09/10 1630                 



                                 O2 Delivery Nasal cannula 09/10 1630           

      



                                 O2 Flow Rate 3.457721 09/10 1630               

  



                                 B/P 95/44 09/10 1528                 



                                 B/P Mean 61.1 09/10 152                 



                                 Temp 98.1 09/10 1528                 



                                 Pulse 63 09/10 1528                 



                                 Resp 18 09/10 152                 



                                                                



                                24 hour I O ending at 0700:                 



                                 09/10 0700  1900                 



                                 Intake Total 1580.00 100.00                 



                                 Output Total                   



                                 Balance 1580.00 100.00                 



                                                                



                                 Intake, IV 1100.00 100.00                 



                                 Intake, Oral 480                 



                                  Number Voids 2                 



                                 Patient 217 lb                 



                                 Weight                         



                                 Weight Standing scale                 



                                 Measurement                    



                                 Method                         



                                                                



                                Patient Weight                  



                                                                



                                Weight (lb): 217                 



                                Weight (oz): 2.49                 



                                Weight (kg): 98.43                 



                                                                



                                Post-op: day 1                  



                                                                



                                Physical Exam                   



                                General appearance: alert, awake, no acute distr

ess                 



                                Cardiovascular: normal heart sounds, regular rat

e rhythm, no murmur                 



                                Respiratory: clear to auscultation              

   



                                Abdomen: non-tender, normal bowel sounds, soft, 

no distention                 



                                Genitourinary: no phillips                 



                                Extremities:                    



                                 Extremities: no calf tenderness, no edema      

           



                                                                



                                Results                         



                                Findings/Data:                  



                                Laboratory Tests                 



                                                                



                                09/10/19 0445:                  



                                [Embedded Image Not Available]                 



                                Laboratory Tests                 



                                 09/10 09/10 09/10 09/10 09/09                 



                                  1636 1134 0621 0445 2046                 



                                 Chemistry                      



                                 Sodium (136 - 145 mmol/L) 141                 



                                 Potassium (3.5 - 5.1 mmol/L)  4.7              

   



                                 Chloride (98 - 107 mmol/L) 108.0 H             

    



                                 Carbon Dioxide (21 - 32 mmol/L) 20.8 L         

        



                                 BUN (7 - 18 mg/dL)  21 H                 



                                 Creatinine (0.55 - 1.30 mg/dL) 1.18            

     



                                 Glomerular Filtr Rate (>60) 45.4               

  



                                 Glucose (70 - 110 mg/dL) 134 H                 



                                 POC Glucose (60 - 125 mg/dL) 189 H 135 H 131 H 

181 H                 



                                 Calcium (8.2 - 10.1 mg/dL) 8.0 L               

  



                                                                



                                Laboratory Tests                 



                                 09/10                          



                                 0445                           



                                 Hematology                     



                                  Hgb (12 - 16 g/dL) 9.9 L                 



                                 Hct (37 - 47 %) 30.2 L                 



                                                                



                                                                



                                Results: labs reviewed                 



                                                                



                                Diagnosis, Assessment Plan                 



                                Problem List/A P:                 



                                 1. Type 2 diabetes mellitus without complicatio

ns                 



                                                                



                                 2. Essential (primary) hypertension            

     



                                                                



                                 3. Atrial fibrillation                 



                                                                



                                 4. SSS (sick sinus syndrome)                 



                                                                



                                 5. TEODORA (obstructive sleep apnea)               

  



                                                                



                                 6. Asthma                      



                                                                



                                 7. CKD (chronic kidney disease) stage 3, GFR 30

-59 ml/min                 



                                                                



                                 8. Infection of total left knee replacement    

             



                                                                



                                                                



                                Free Text DxA P Notes                 



                                Free Text DxA P Notes:                 



                                Pt's BS's remain elevated. Her renal fct has imp

roved and is adequate enough                 



                                for the pt to restart metformin. Pt's BP is low 

normal this evening. Pt                 



                                remains i a regular cardiac rhythm. Pt has mild 

acute blood loss anemia from                 



                                surgery. Continue current treatment. Continue to

 mobilize.                 



                                                                



                                Electronically Signed by Marco Antonio Vasquez MD on 09/

10/19 at                  



                                                                



                                RPT #:[id number]                 



                                ***END OF REPORT***                 



                                                                



                                                                

 

                2019-09-10 12:40:00-00:00                                 [facility] ([facility])               

  



                                Clinical Note                   



                                REPORT#:[id number] REPORT STATUS: Signed         

        



                                DATE:09/10/19 TIME: 1240                 



                                                                



                                PATIENT: MARIA G ARTEAGA  UNIT #: [id number]

180                 



                                ACCOUNT#: [id number] ROOM/BED: [address]        

         



                                : [birth year] AGE: 68 SEX: F ATTEND: Fidel Renae MD                 



                                ADM DT: 19 AUTHOR: Toan Renae MD    

             



                                                                



                                * ALL edits or amendments must be made on the [employer]/computer document *                 



                                                                



                                                                



                                Clinical Note                   



                                Note:                           



                                POD #1                          



                                                                



                                Feels ok. Amb in diego. Pain controlled.         

        



                                                                



                                PE:                             



                                                                



                                Last Documented:                 



                                 Result Date Time                 



                                 Pulse Ox 95 09/10 1136                 



                                  B/P 135/80 09/10 1136                 



                                 B/P Mean 98.2 09/10 113                 



                                 O2 Delivery Room air 09/10 113                

 



                                 Temp  97.7 09/10 1136                 



                                 Pulse 74 09/10 1136                 



                                 Resp 18 09/10 1136                 



                                 FiO2 32 09/10 0745                 



                                 O2 Flow Rate 3.909052 09/10 0745               

  



                                                                



                                Drsg D/I. Foot NVI.                 



                                                                



                                Laboratory Tests:                 



                                 09/10 09/10 09/10 09/09                 



                                 1134 0621 0445 2046                 



                                 Chemistry                      



                                 Sodium (136 - 145 mmol/L) 141                 



                                 Potassium (3.5 - 5.1 mmol/L) 4.7               

  



                                 Chloride (98 - 107 mmol/L) 108.0 H             

    



                                 Carbon Dioxide (21 - 32 mmol/L) 20.8 L         

        



                                 BUN (7 - 18 mg/dL) 21 H                 



                                 Creatinine (0.55 - 1.30 mg/dL) 1.18            

     



                                 Glomerular Filtr Rate (>60) 45.4               

  



                                  Glucose (70 - 110 mg/dL) 134 H                

 



                                 POC Glucose (60 - 125 mg/dL) 135 H 131 H 181 H 

                



                                 Calcium (8.2 - 10.1 mg/dL)  8.0 L              

   



                                 Hematology                     



                                 Hgb (12 - 16 g/dL) 9.9 L                 



                                 Hct (37 - 47 %) 30.2 L                 



                                                                



                                D/W ID service. Plan for PICC line. Plan shorter

 duration IV abx, 2-3 weeks.                 



                                Follow cxs.                     



                                                                



                                Prob d/c home tomorrow.                 



                                                                



                                Electronically Signed by Toan Renae MD on 

09/10/19 at 1242                 



                                                                



                                RPT #:[id number]                 



                                ***END OF REPORT***                 



                                                                



                                                                

 

                2019-09-10 09:35:00-00:00                                 [facility] ([facility])               

  



                                Infectious Dis. Progress Note                 



                                REPORT#:[id number] REPORT STATUS: Signed         

        



                                DATE:09/10/19 TIME: 935                 



                                                                



                                PATIENT: MARIA G ARTEAGA UNIT #: [id number]

80                 



                                ACCOUNT#: [id number] ROOM/BED: [address]        

         



                                : [birth year] AGE: 68 SEX: F ATTEND: Fidel Renae MD                 



                                ADM DT: 19 AUTHOR: Mary Helm MD 

                



                                                                



                                * ALL edits or amendments must be made on the [employer] document *                 



                                                                



                                                                



                                Subjective                      



                                Chief Complaint:                 



                                L knee pain and swelling                 



                                Patient reports:                 



                                Yes: pain controlled. No: nausea, vomiting.     

            



                                Comments:                       



                                Pt reports quiet night. No pain when she is immo

bile. Discomfort is notable                 



                                once she starts to move around.                 



                                                                



                                Objective                       



                                                                



                                General                         



                                VS/I O:                         



                                Last Documented:                 



                                 Result Date Time                 



                                 Pulse Ox 98 09/10 0731                 



                                 B/P 132/79 09/10 0731                 



                                 B/P Mean 96.4 09/10 0731                 



                                 O2 Delivery Nasal cannula 09/10 0731           

      



                                 Temp  36.7 09/10 0731                 



                                 Pulse 76 09/10 0731                 



                                 Resp 18 09/10 0731                 



                                 FiO2  32 09/10 0008                 



                                 O2 Flow Rate 3.594513 09/10 0008               

  



                                                                



                                Vital Signs                     



                                Date Temp Pulse Resp B/P B/P Mean Pulse Ox FiO2 

                



                                -09/10 36.4-36.7 60-83 15-20 129-181/65-79 

94.4-100.3  32                 



                                                                



                                24 hour I O ending at 0700:                 



                                 09/10 0700  1900                 



                                  Intake Total 1580.00 100.00                 



                                 Output Total                   



                                 Balance 1580.00 100.00                 



                                                                



                                 Intake, IV 1100.00 100.00                 



                                 Intake, Oral 480                 



                                 Number Voids 2                 



                                 Patient 98.43 kg                 



                                  Weight                        



                                 Weight Standing scale                 



                                 Measurement                    



                                 Method                         



                                                                



                                Patient Weight                  



                                                                



                                Weight (lb): 217                 



                                Weight (oz): 2.49                 



                                Weight (kg): 98.43                 



                                                                



                                Antibiotic start date:                 



                                Antibiotic: Ancef                 



                                Start Date: 19                 



                                                                



                                                                



                                Physical Exam                   



                                General appearance: alert, no acute distress, pl

easant, conversational                 



                                Wound/incision:                 



                                 Location:                      



                                RLE                             



                                 Site condition: dressing clean dry             

    



                                                    Head/Eyes: anicteric, small 

areas of erythema on cheeks bilaterally R>L that pt

                                                    



                                states are from her CPAP mask use overnight     

            



                                ENT: + NC O2                    



                                Neck: full range of motion                 



                                Cardiovascular: normal heart sounds, regular rat

e rhythm                 



                                Respiratory: clear to auscultation (anteriorly) 

                



                                Abdomen: non-tender, normal bowel sounds, soft, 

no distention                 



                                Extremities: no edema                 



                                Neuro/CNS: alert, normal speech                 



                                Skin: no rash                   



                                                                



                                Results                         



                                Findings/Data:                  



                                Laboratory Tests                 



                                 09/10 09/10 09/09                 



                                 0621 0445 6                 



                                 Chemistry                      



                                  Sodium (136 - 145 mmol/L) 141                 



                                 Potassium (3.5 - 5.1 mmol/L) 4.7               

  



                                 Chloride (98 - 107 mmol/L) 108.0 H             

    



                                  Carbon Dioxide (21 - 32 mmol/L) 20.8 L        

         



                                 BUN (7 - 18 mg/dL) 21 H                 



                                 Creatinine (0.55 - 1.30 mg/dL) 1.18            

     



                                  Glomerular Filtr Rate (>60) 45.4              

   



                                 Glucose (70 - 110 mg/dL) 134 H                 



                                 POC Glucose (60 - 125 mg/dL) 131 H 181 H       

          



                                 Calcium (8.2 - 10.1 mg/dL) 8.0 L               

  



                                                                



                                Laboratory Tests                 



                                 09/10                          



                                  0445                          



                                 Hematology                     



                                 Hgb (12 - 16 g/dL) 9.9 L                 



                                 Hct (37 - 47 %) 30.2 L                 



                                                                



                                                                



                                Results:  Op cxs ngtd                 



                                                                



                                Treatment Prophylaxis                 



                                                                



                                Treatment Prophylaxis                 



                                Lines: peripheral                 



                                                                



                                                                



                                Diagnosis, Assessment Plan                 



                                Free Text A P:                  



                                67 yo woman with remote h/o L TKA admitted for p

resumed infection. Joint                 



                                        aspirate performed on  while she was

 on abx for diverticulitis was culture 

                                        



                                                    negative but with increased 

inflammatory markers suggestive of joint infection.

                                                    



                                She underwent I D with liner exchange on 19.

                 



                                                                



                                1) Discussed with Dr Stocks this AM. Operative f

indings were minimal compared                 



                                        to her initial sxs and aspirate results.

 Cultures thusfar are ngtd though have 

                                        



                                not been in lab long. She was off po abx for 7+ 

days prior to surgery (was on                 



                                                    Augmentin for diverticulitis

). We agree it would be reasonable to do at least a

                                                    



                                short course on IV abx with possible addition of

 po subsequently depending on                 



                                her clinical course. PICC placement ordered.    

             



                                                                



                                        2) Plan to change abx to CTX and Dapto. 

I am concerned about use of vancomycin 

                                        



                                                    in her given her renal insuf

ficiency which has historically been a little bit 

of                                                  



                                a moving target.                 



                                                                



                                Will write tentative HH orders today.           

      



                                                                



                                Electronically Signed by Mary Helm MD 

on 09/10/19 at 0942                 



                                                                



                                RPT #:[id number]                 



                                ***END OF REPORT***                 



                                                                



                                                                

 

                2019-09-10 09:35:00-00:00                                 [facility] ([facility])               

  



                                Infectious Dis. Progress Note                 



                                REPORT#:[id number] REPORT STATUS: Signed         

        



                                DATE:09/10/19 TIME: 935                 



                                                                



                                PATIENT: MARIA G ARTEAGA UNIT #: [id number]

80                 



                                ACCOUNT#: [id number] ROOM/BED: [address]        

         



                                : 49 AGE: 68 SEX: F ATTEND: Fidel Renae MD                 



                                ADM DT: 19 AUTHOR: Mary Helm MD 

                



                                                                



                                * ALL edits or amendments must be made on the [employer]/computer document *                 



                                                                



                                  **See Addendum**                 



                                Subjective                      



                                Chief Complaint:                 



                                L knee pain and swelling                 



                                Patient reports:                 



                                Yes: pain controlled. No: nausea, vomiting.     

            



                                Comments:                       



                                Pt reports quiet night. No pain when she is immo

bile. Discomfort is notable                 



                                once she starts to move around.                 



                                                                



                                Objective                       



                                                                



                                General                         



                                VS/I O:                         



                                Last Documented:                 



                                  Result Date Time                 



                                 Pulse Ox 98 09/10 0731                 



                                 B/P 132/79 09/10 0731                 



                                 B/P Mean  96.4 09/10 0731                 



                                 O2 Delivery Nasal cannula 09/10 0731           

      



                                 Temp 36.7 09/10 0731                 



                                 Pulse  76 09/10 0731                 



                                 Resp 18 09/10 0731                 



                                 FiO2 32 09/10 0008                 



                                 O2 Flow Rate 3.330572 09/10 0008               

  



                                                                



                                Vital Signs                     



                                Date Temp Pulse Resp B/P B/P Mean Pulse Ox FiO2 

                



                                -09/10 36.4-36.7 60-83 15-20 129-181/65-79 

94.4-100.3  32                 



                                                                



                                24 hour I O ending at 0700:                 



                                 09/10 0700  1900                 



                                 Intake Total 1580.00 100.00                 



                                 Output Total                   



                                 Balance 1580.00 100.00                 



                                                                



                                 Intake, IV 1100.00 100.00                 



                                 Intake, Oral 480                 



                                  Number Voids 2                 



                                 Patient 98.43 kg                 



                                 Weight                         



                                 Weight Standing scale                 



                                 Measurement                    



                                 Method                         



                                                                



                                Patient Weight                  



                                                                



                                Weight (lb): 217                 



                                Weight (oz): 2.49                 



                                Weight (kg): 98.43                 



                                                                



                                Antibiotic start date:                 



                                Antibiotic: Ancef                 



                                Start Date: 19                 



                                                                



                                                                



                                Physical Exam                   



                                General appearance: alert, no acute distress, pl

easant, conversational                 



                                Wound/incision:                 



                                 Location:                      



                                RLE                             



                                 Site condition: dressing clean dry             

    



                                                    Head/Eyes: anicteric, small 

areas of erythema on cheeks bilaterally R>L that pt

                                                    



                                states are from her CPAP mask use overnight     

            



                                ENT: + NC O2                    



                                Neck: full range of motion                 



                                Cardiovascular: normal heart sounds, regular rat

e rhythm                 



                                Respiratory: clear to auscultation (anteriorly) 

                



                                Abdomen: non-tender, normal bowel sounds, soft, 

no distention                 



                                Extremities: no edema                 



                                Neuro/CNS: alert, normal speech                 



                                Skin: no rash                   



                                                                



                                Results                         



                                Findings/Data:                  



                                Laboratory Tests                 



                                 09/10 09/10 09/09                 



                                 0621 0445                  



                                 Chemistry                      



                                 Sodium (136 - 145 mmol/L) 141                 



                                  Potassium (3.5 - 5.1 mmol/L) 4.7              

   



                                 Chloride (98 - 107 mmol/L) 108.0 H             

    



                                 Carbon Dioxide (21 - 32 mmol/L) 20.8 L         

        



                                 BUN (7 - 18 mg/dL) 21 H                 



                                 Creatinine (0.55 - 1.30 mg/dL) 1.18            

     



                                 Glomerular Filtr Rate (>60) 45.4               

  



                                 Glucose (70 - 110 mg/dL) 134 H                 



                                 POC Glucose (60 - 125 mg/dL) 131 H 181 H       

          



                                 Calcium (8.2 - 10.1 mg/dL) 8.0 L               

  



                                                                



                                Laboratory Tests                 



                                 09/10                          



                                 0445                           



                                 Hematology                     



                                  Hgb (12 - 16 g/dL) 9.9 L                 



                                 Hct (37 - 47 %) 30.2 L                 



                                                                



                                                                



                                Results:  Op cxs ngtd                 



                                                                



                                Treatment Prophylaxis                 



                                                                



                                Treatment Prophylaxis                 



                                Lines: peripheral                 



                                                                



                                                                



                                Diagnosis, Assessment Plan                 



                                Free Text A P:                  



                                67 yo woman with remote h/o L TKA admitted for p

resumed infection. Joint                 



                                        aspirate performed on  while she was

 on abx for diverticulitis was culture 

                                        



                                                    negative but with increased 

inflammatory markers suggestive of joint infection.

                                                    



                                She underwent I D with liner exchange on 19.

                 



                                                                



                                1) Discussed with Dr Renae this AM. Operative f

indings were minimal compared                 



                                        to her initial sxs and aspirate results.

 Cultures thusfar are ngtd though have 

                                        



                                not been in lab long. She was off po abx for 7+ 

days prior to surgery (was on                 



                                                    Augmentin for diverticulitis

). We agree it would be reasonable to do at least a

                                                    



                                short course on IV abx with possible addition of

 po subsequently depending on                 



                                her clinical course. PICC placement ordered.    

             



                                                                



                                        2) Plan to change abx to CTX and Dapto. 

I am concerned about use of vancomycin 

                                        



                                                    in her given her renal insuf

ficiency which has historically been a little bit 

of                                                  



                                a moving target.                 



                                                                



                                Will write tentative HH orders today.           

      



                                                                



                                Electronically Signed by Mary Helm MD 

on 09/10/19 at 0942                 



                                                                



                                Addendum 1: 19 1024 by Mary Helm MD                 



                                                                



                                Exam section inadvertantly documented as RLE - b

andage/procedure on LLE                 



                                                                



                                Electronically Signed by Mary Helm MD 

on 19 at 1025                 



                                                                



                                RPT #:[id number]                 



                                ***END OF REPORT***                 



                                                                



                                                                

 

                2019 20:06:00-00:00                                 [facility] ([facility])               

  



                                Infect Disease Consult Note                 



                                REPORT#:[id number] REPORT STATUS: Signed         

        



                                DATE:19 TIME:                  



                                                                



                                PATIENT: MARIA G ARTEAGA  UNIT #: [id number]

180                 



                                ACCOUNT#: [id number] [facility]        

         



                                : 49 AGE: 68 SEX: F ATTEND: Fidel Renae MD                 



                                ADM DT: 19  AUTHOR: Mary Helm MD

                 



                                                                



                                * ALL edits or amendments must be made on the el

[employer]/computer document *                 



                                                                



                                                                



                                History of Present Illness                 



                                Requesting Clinician: Calvin                 



                                Reason for consult:                 



                                L TKA infection                 



                                Chief complaint:                 



                                L knee pain and swelling                 



                                                                



                                Free Text HPI Notes                 



                                Free Text HPI Notes:                 



                                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 

yrs ago who noted onset of                 



                                                    swelling and discomfort in h

er L knee in late /early July for approximately

                                                    



                                                    4-5 days. Symptoms resolved 

spontaneously before she was able to make it in for

                                                    



                                an appt with Dr Renae for evaluation.          

       



                                                                



                                                    She reports her knee felt fi

ne over the next month or so - in early August, she

                                                    



                                was diagnosed with her first episode of divertic

ulitis. She was given abx for                 



                                10-14 days for this with improvement in abdomina

l sxs. Towards the end of her                 



                                                    abx course for diverticuliti

s, she developed recurrent L knee swelling and pain

                                                    



                                such that she presented to Dr Renae for evaluat

ion.                 



                                                                



                                                    L knee aspiration was perfor

med on  while the pt was completing her abx for

                                                    



                                                    diverticulitis and showed ev

idence of possible infection with increased WBC's 

as                                                  



                                        well as a positive Synovasure per report

. She was admitted to the hospital  

                                        



                                and underwent I D with liner exchange. At the ti

me of admission, she had been                 



                                off oral abx for at least 7 days.               

  



                                                                



                                At present, pt reports minimal pain and no nause

a.  is present at                 



                                bedside.                        



                                                                



                                History - Adult longitudinal                 



                                Past medical history:                 



                                Reports: Atrial fib/flutter, Diabetes mellitus, 

Hypertension.                 



                                Past surgical history:                 



                                Reports: Cholecystectomy, Knee procedure.       

          



                                Additional surgical history:                 



                                Pacemaker                       



                                Alcohol use: Denies EtOH use                 



                                Smoking status for patients 13 years old or olde

r: Never Smoker                 



                                Medications:                    



                                Home Medications:                 



                                Medication Dose/Rte/Freq Days Qty Entered Last  

               



                                 Max Daily Dose Reviewed                 



                                 METOPROLOL SUCC  MG PO DAILY AM 12                 



                                 (TOPROL XL)  1014 1056                 



                                Strength: 100 MG TAB.SA                 



                                 SERTRALINE (ZOLOFT) 50 MG PO BEDTIME 12 0

19                 



                                Strength: 50 MG TAB  1015 1056                 



                                 MONTELUKAST (SINGULAIR) 10 MG PO DAILY AM 19                 



                                Strength: 10 MG TAB 0947  1056                 



                                 IRBESARTAN (AVAPRO) 300 MG PO DAILY PM 16                 



                                Strength: 300 MG TAB 1635 1056                 



                                 metFORMIN (GLUCOPHAGE) 500 MG PO BID 16 0

19                 



                                Strength: 1,000 MG TAB 1636 1056                

 



                                 LEVOTHYROXINE 125 MCG PO DAILY AM  16                 



                                 (SYNTHROID) 1637 1056                 



                                Strength: 125 MCG TAB                 



                                 APIXABAN (ELIQUIS) 5 MG PO BID 16

9                 



                                Strength: 5 MG TAB 1638 1056                 



                                 FLECAINIDE (TAMBOCOR) 100 MG PO BID 16                 



                                Strength: 50 MG TAB 1711 1056                 



                                [TRADJENTA] 1 TAB PO DAILY 19    

             



                                Strength: 5 MG  1625 1056                 



                                 ALLOPURINOL (ZYLOPRIM) 100 MG PO BID 19 0

19                 



                                Strength: 100 MG TAB 1626 1056                 



                                 FENOFIBRATE (TRICOR) 145 MG PO DAILY 19 0

19                 



                                Strength: 145 MG TAB 1632 1056                 



                                 FUROSEMIDE (LASIX) 40 MG PO EOD 19                 



                                Strength: 40 MG TAB 1733 1056                 



                                 DILTIAZEM  MG PO DAILY AM 19                 



                                 (CARDIZEM CD) 1733 1056                 



                                Strength: 360 MG                 



                                CAP.SR.24H                      



                                 CHOLECALCIFEROL 1,000 UNITS PO BID 16                 



                                 (VITAMIN D3) 1704 1056                 



                                 (VITAMIN D3)                   



                                Strength: 1,000 UNITS CAP                 



                                 BECLOMETHASONE 2 PUFF INH  16   

              



                                 (QVAR 40 MCG/ACT) RTBID PRN ASTHMA 0655 1056   

              



                                Strength: 7.3 GM INHALER                 



                                 CALCIUM CITRATE 1,000 MG PO DAILY  17                 



                                 (CALCITRATE) 1805 1056                 



                                Strength: 200 MG TAB                 



                                 predniSONE 10 MG PO 17          

       



                                Strength: 20 MG TAB DAILY PRN PRN GOUT 1805 1056

                 



                                                                



                                Current Hospital Medications:                 



                                Ahfs Category Unknown                 



                                  Sig/Elijah Start time Last                 



                                 Medication Dose Route Stop Time Status Admin   

              



                                 Patient Own See Dose DAILY 09/10 0900 AC       

          



                                 Medication Insts (1) PO 10/10 0901             

    



                                 (PATIENT'S OWN                 



                                 MEDICATION)                    



                                 Patient Own See Dose BID 2000 AC         

        



                                 Medication Insts (2) PO 10/09 2001             

    



                                 (PATIENT'S OWN                 



                                 MEDICATION)                    



                                                                



                                Anti-Infective Agents                 



                                 Sig/Elijah Start time Last                 



                                 Medication Dose Route Stop Time Status Admin   

              



                                 Cefazolin Sodium 2 GM Q8H  1830 AC   

               



                                 (KEFZOL) IV 09/10 1031  1747                 



                                 Vancomycin HCl 0 .STK-MED ONE  1232 DC    

             



                                 (VANCOMYCIN HCL) IV                 



                                 Cefazolin Sodium 2 GM ONCALL  0500 DC     

            



                                 (KEFZOL) IV  2300                 



                                                                



                                Antihistamine Drugs                 



                                 Sig/Elijah Start time Last                 



                                 Medication Dose Route  Stop Time Status Admin  

               



                                 Diphenhydramine HCl 12.5 MG PACU STAT  134

3 DC                  



                                 (BENADRYL) IV  2300 1350                 



                                 Diphenhydramine HCl 25 MG Q4H PRN PRN  124

5 CAN                 



                                 (BENADRYL) PO 10/09 1246                 



                                                                



                                Autonomic Drugs                 



                                 Sig/Elijah Start time Last                 



                                 Medication Dose Route Stop Time Status Admin   

              



                                 Albuterol Sulfate 2.5 MG RTQ4H PRN PRN  18

30 AC                 



                                 (PROVENTIL)  INH 10/09 1831                 



                                 Methocarbamol 750 MG Q6HR  1800 AC   

               



                                 (ROBAXIN-750) PO 10/09 1801 1746               

  



                                 Albuterol Sulfate 2.5 MG RTSTAT  1343 DC 0

                 



                                 (PROVENTIL) INH  2300 1351                

 



                                 Methocarbamol 500 MG PACU ONCE PRN  0700 D

C                 



                                 (ROBAXIN) PO 09/10 06                 



                                 Methocarbamol 500 MG PREOP ONCE  0500 DC 0

                 



                                 (ROBAXIN) PO   2300 1052                 



                                                                



                                Blood Formation,Coagulation                 



                                 Sig/Elijah Start time Last                 



                                 Medication Dose Route Stop Time Status Admin   

              



                                 Apixaban 5 MG BID 6A 6P 09/10 0600 AC          

       



                                 (Eliquis) PO 10/10 06                 



                                                                



                                Cardiovascular Drugs                 



                                 Sig/Elijah Start time Last                 



                                 Medication Dose Route Stop Time Status Admin   

              



                                 Diltiazem HCl 360 MG DAILY 09/10 0900 AC       

          



                                 (CARDIZEM CD) PO 10/10 0901                 



                                 Fenofibrate 160 MG DAILY 09/10 0900 AC         

        



                                 (Fenofibrate) PO 10/10 0901                 



                                 Losartan Potassium 100 MG DAILY 09/10 0900 AC  

               



                                 (COZAAR) PO 10/10 0901                 



                                 Metoprolol Succinate 150 MG DAILY  09/10 0900 A

C                 



                                 (TOPROL XL) PO 10/10 0901                 



                                 Hydralazine HCl 10 MG PACU Q10MIN PRN PRN 

 0700 DC                  



                                 (hydrALAZINE HCL)  IV 09/10 06 1412          

       



                                 Metoprolol Tartrate 2 MG PACU Q10MIN PRN PRN  0700 DC                 



                                 (LOPRESSOR) IV 09/10 0600                 



                                 Lidocaine HCl 2 ML PREOP  0500 DC    

              



                                 (XYLOCAINE) IV  2300 1052                 



                                                                



                                Central Nervous System Agents                 



                                 Sig/Elijah Start time Last                 



                                 Medication Dose Route Stop Time Status Admin   

              



                                 Sertraline HCl 50 MG  BEDTIME  2100 AC    

             



                                 (ZOLOFT) PO 10/09 2101                 



                                 Acetaminophen 650 MG Q4H PRN PRN  1245 AC 

                



                                 (TYLENOL) PO 10/09 1246                 



                                 Hydrocodone Bitart/ 1 UDTAB Q4H PRN PRN  1

245 AC                 



                                 Acetaminophen PO  1246                 



                                 (NORCO 5/325 TABLET)                 



                                 Hydrocodone Bitart/ 1 UDTAB Q4H PRN PRN  1

245 AC                 



                                 Acetaminophen PO  1246                 



                                 (NORCO 10/325 TABLET)                 



                                 Hydromorphone HCl 0.5 MG Q3H PRN PRN  1245

 AC                 



                                 (DILAUDID) IV  1246                 



                                 Hydroxyzine 25 MG Q4H PRN PRN  1245 AC    

             



                                 (ATARAX) PO 10/09 1246                 



                                 Promethazine HCl 12.5 MG Q6H PRN PRN  1245

 AC                 



                                 (PHENERGAN) IM  10/09 1246                 



                                 Trazodone HCl 25 MG BEDTIME PRN PRN  1245 

AC                 



                                 (DESYREL) PO 10/09 1246                 



                                 Zolpidem Tartrate 5 MG BEDTIME PRN PRN  12

45 CAN                 



                                 (AMBIEN) PO  1246                 



                                 Morphine Sulfate 0 .STK-MED ONE  1047 DCr 

                



                                 (morphine SULFATE  .ROUTE                 



                                 INJECTION)                     



                                 Fentanyl Citrate 25 MCG PACU Q5MIN PRN PRN  07 DC                  



                                 (SUBLIMAZE) IV 09/10 06  1342                

 



                                 Hydromorphone HCl 0.4 MG PACU Q5MIN PRN PRN  0700 DC                 



                                 (DILAUDID) IV 09/10 06                 



                                 Promethazine HCl 6.25 MG PACU Q15MIN PRN PRN  07 DC                 



                                 (PHENERGAN) IV 09/10 0600                 



                                 Ketorolac 0 .STK-MED ONE  0551 DC         

        



                                 Tromethamine  .ROUTE                 



                                 (TORADOL)                      



                                 Acetaminophen 650 MG PREOP ONCE  0500 DC 0

                 



                                 (TYLENOL) PO  2300 1053                 



                                 Pregabalin 50 MG PREOP  0500 DC      

            



                                 (LYRICA) PO  2300 1052                 



                                                                



                                Electrolytic, Caloric, And Lena                 



                                 Sig/Elijah Start time Last                 



                                 Medication Dose Route Stop Time Status Admin   

              



                                 Furosemide 40 MG Q48HR  0900 AC           

      



                                 (LASIX) PO 10/11 09                 



                                 Dextrose/Water 50 ML ASDIR PRN  1830 AC   

              



                                 (DEXTROSE IN WATER) IV 10/09 183              

   



                                 Sodium Chloride 20 ML Q8H  1830 AC   

               



                                 (SODIUM CHLORIDE) IV 09/10 1031 1747           

      



                                 Sodium Chloride 1,000 ML .Q10H  1730 AC                  



                                 (SODIUM CHLORIDE IV 10/09 173 1746            

     



                                  0.9%)                         



                                 Sodium Chloride 250 ML .STK-MED ONE  1232 

DC                 



                                 (SODIUM CHLORIDE IV                 



                                 0.9%)                          



                                 Lactated Ringer's 1,000 ML PREOP IV FLUID 

 0500 DC                  



                                 (LACTATED RINGERS) IV  230 1052          

       



                                                                



                                Eye, Ear, Nose And Throat (Een                 



                                 Sig/Elijah Start time Last                 



                                 Medication Dose Route Stop Time Status Admin   

              



                                 Dexamethasone Sodium 10 MG 1200 09/10 1200 AC  

               



                                 Phosphate IV 09/10 1400                 



                                 (DEXAMETHASONE                 



                                 SODIUM PHOSPHATE)                 



                                 Bacitracin 0 .STK-MED ONE  0552 DC        

         



                                 (BACITRACIN STERILE) IM                 



                                                                



                                Gastrointestinal Drugs                 



                                 Sig/Elijah Start time Last                 



                                 Medication Dose Route Stop Time Status Admin   

              



                                 Famotidine 40 MG DAILY 09/10 0900 AC           

      



                                 (PEPCID) PO 10/10 09                 



                                 Docusate Sodium 100 MG BID   2100 AC      

           



                                 (COLACE) PO 10/09 2101                 



                                 Al Hydrox/Mg Hydrox/ 30 ML Q6H PRN PRN  12

45 AC                 



                                 Simethicone PO  10/09 1246                 



                                 (MAALOX PLUS)                  



                                 Bisacodyl 10 MG BEDTIME PRN PRN  1245 AC  

               



                                 (BISACODYL 10 MG RECTAL 10/09 1246             

    



                                 SUPP)                          



                                 Magnesium Hydroxide 30 ML BEDTIME PRN PRN 

 1245 AC                 



                                 (MILK OF MAGNESIA) PO 10/09 1246               

  



                                 Ondansetron HCl 4 MG Q8H PRN PRN  1245 AC 

                



                                 (ZOFRAN) IV 10/09 1246                 



                                 Ondansetron HCl 4 MG Q8H PRN PRN  1245 AC 

                



                                 (ONDANSETRON HCL) PO  10/09 1246               

  



                                 Ondansetron HCl 4 MG PACU ONCE PRN PRN  07

00 DC                 



                                 (ZOFRAN) IV 09/10 0600                 



                                                                



                                Hormones And Synthetic Substit                 



                                 Sig/Elijah Start time Last                 



                                 Medication Dose Route Stop Time Status Admin   

              



                                 Levothyroxine Sodium 125 MCG DAILY 0600 09/10 0

600 AC                 



                                 (Synthroid) PO 10/10 06                 



                                 Insulin Human Lispro See Dose AC HS  2100 

AC                 



                                 (HumaLOG 100 UNITS/ Insts (3) SUBQ 10/09 210  

               



                                 ML 3 ML VIAL)                  



                                 Prednisone 10 MG DAILY PRN PRN  1545 AC   

              



                                 (DELTASONE) PO 10/09 1546                 



                                                                



                                Local Anesthetics (Parenteral)                 



                                 Sig/Elijah Start time Last                 



                                 Medication Dose Route Stop Time Status Admin   

              



                                 Ropivacaine 0 .STK-MED ONE  0552 DC       

          



                                 (NAROPIN) .ROUTE                 



                                                                



                                Miscellaneous Therapeutic Agen                 



                                 Sig/Elijah Start time Last                 



                                 Medication Dose Route Stop Time Status Admin   

              



                                 Montelukast Sodium 10 MG DAILY 09/10 0900 AC   

              



                                 (SINGULAIR) PO 10/10 09                 



                                 Allopurinol 100 MG BID  2100 AC           

      



                                 (ZYLOPRIM) PO 10/09 210                 



                                 Tramadol HCl 50 MG Q4HR  1700 AC     

             



                                 (ULTRAM) PO  1701 1747                 



                                 Tramadol HCl 50 MG Q6H PRN PRN  0700 DC   

              



                                 (ULTRAM)  PO 09/10 0600                 



                                 Tramadol HCl 100 MG Q6H PRN PRN  0700 DC  

               



                                 (ULTRAM) PO 09/10 0600                 



                                                                



                                Other                           



                                  Sig/Elijah Start time Last                 



                                 Medication Dose Route Stop Time Status Admin   

              



                                 Pharmacy Profile Note See Dose Q2HR  1700 

DC                 



                                 (CLARIFICATION Insts (4) PO 10/09 170         

        



                                 NEEDED)                        



                                 Pharmacy Profile Note See Dose Q2HR  1700 

DC                 



                                 (CLARIFICATION Insts (5) PO 10/09 170         

        



                                 NEEDED)                        



                                 Pharmacy Profile Note See Dose Q2HR  1700 

AC                 



                                 (CLARIFICATION Insts (6) PO 10/09 170         

        



                                 NEEDED)                        



                                 Pharmacy Profile Note See Dose Q2HR  1700 

AC                 



                                 (CLARIFICATION Insts (7) PO 10/09 170         

        



                                 NEEDED)                        



                                                                



                                Dose Instructions:                 



                                (1)Patient Own Medication (PATIENT'S OWN MEDICAT

ION):                 



                                 TRADJENTA 5 MG TABLET                 



                                 DOSE = 1 TABLET                 



                                (2)Patient Own Medication (PATIENT'S OWN MEDICAT

ION):                 



                                 FLECAINIDE 100 MG TABLET                 



                                 DOSE = 1 TABLET                 



                                (3)Insulin Human Lispro (HumaLOG 100 UNITS/ML 3 

ML VIAL):                 



                                 PER SLIDING SCALE                 



                                (4)Pharmacy Profile Note (CLARIFICATION NEEDED):

                 



                                 TRADJENTA                      



                                 Nurse Do not delete this Rx.                 



                                 Pharmacy will delete.                 



                                 To remove the admin time, use                 



                                 FD (full document) Given = No                 



                                (5)Pharmacy Profile Note (CLARIFICATION NEEDED):

                 



                                 FLECAINIDE 100 MG                 



                                 Nurse Do not delete this Rx.                 



                                 Pharmacy will delete.                 



                                 To remove the admin time, use                 



                                 FD (full document) Given = No                 



                                (6)Pharmacy Profile Note (CLARIFICATION NEEDED):

                 



                                 QVAR 40 MCG/ACT                 



                                 Nurse Do not delete this Rx.                 



                                 Pharmacy will delete.                 



                                 To remove the admin time, use                 



                                 FD (full document) Given = No                 



                                (7)Pharmacy Profile Note (CLARIFICATION NEEDED):

                 



                                 METFORMIN 500 MG                 



                                 Nurse Do not delete this Rx.                 



                                 Pharmacy will delete.                 



                                 To remove the admin time, use                 



                                 FD (full document) Given = No                 



                                                                



                                                                



                                Allergies:                      



                                Coded Allergies:                 



                                meperidine (From DEMEROL) (Severe, ANAPHYLAXIS 0

19)                 



                                rivaroxaban (From XARELTO) (Intermediate, MASSIV

E BLEEDING 19)                 



                                codeine (Mild, VOMITING 19)               

  



                                pentazocine (From TALWIN) (Mild, HALLUCINATIONS 

19)                 



                                                                



                                Occupation:                     



                                Retired RN                      



                                                                



                                Review of Systems                 



                                Constitutional:                 



                                Denies: chills, fever.                 



                                Skin:                           



                                Denies: itching.                 



                                Allergy/Immun:                  



                                Denies sneezing                 



                                Eyes:                           



                                Denies redness, Denies discharge                

 



                                ENT:                            



                                Denies: sinus problem.                 



                                Respiratory:                    



                                Denies: productive cough (sputum), SOB.         

        



                                Cardiovascular:                 



                                Denies: chest pain.                 



                                GI:                             



                                Denies: abdominal pain, nausea, vomiting.       

          



                                Musculoskeletal:                 



                                Reports: joint pain.                 



                                Neuro:                          



                                Denies: confusion.                 



                                                                



                                Objective                       



                                                                



                                General                         



                                VS/I O:                         



                                Last Documented:                 



                                 Result Date Time                 



                                  Pulse Ox 96 1945                 



                                 B/P 139/76 1945                 



                                 B/P Mean 96.9 1945                 



                                 Temp  36.5 1945                 



                                 Pulse 82 1945                 



                                 Resp 16 1945                 



                                 O2 Delivery CPAP   1531                 



                                 FiO2 32  1438                 



                                 O2 Flow Rate [phone]  1438               

  



                                                                



                                Vital Signs                     



                                 Date Temp Pulse Resp  B/P B/P Mean Pulse Ox FiO

2                 



                                  36.4-36.7 60-82 15-20 135-181/65-79 95.6-

96.9  32                 



                                                                



                                Patient Weight                  



                                                                



                                Weight (lb): 217                 



                                Weight (oz): 2.49                 



                                Weight (kg): 98.43                 



                                                                



                                Antibiotic comments:                 



                                Ancef 2g IV q8                  



                                                                



                                Physical Exam                   



                                General appearance: alert, awake, no acute distr

ess, pleasant                 



                                Wound/incision:                 



                                 Location:                      



                                L knee                          



                                 Site condition: dressing clean dry             

    



                                Head/eyes: anicteric                 



                                ENT: + NC O2                    



                                Neck: full range of motion                 



                                Cardiovascular: normal heart sounds, No tachycar

yovani                 



                                Respiratory: clear to auscultation (anteriorly) 

                



                                Abdomen: non-tender, normal bowel sounds, soft, 

no distention                 



                                Extremities: trace LLE edema, leg wrapped       

          



                                Neuro/CNS: alert, normal speech                 



                                Skin: no rash                   



                                                                



                                Results                         



                                Results:  L knee cxs negative/ngtd  L kne

e Op cxs pending                 



                                                                



                                Treatment Prophylaxis                 



                                                                



                                Treatment Prophylaxis                 



                                Lines: peripheral                 



                                                                



                                                                



                                Diagnosis, Assessment Plan                 



                                                                



                                Free Text DxA P Notes                 



                                Free text DxA P notes:                 



                                67 yo woman with remote h/o L TKA admitted for p

resumed infection. Joint                 



                                        aspirate performed on  while she was

 on abx for diverticulitis was culture 

                                        



                                                    negative but with increased 

inflammatory markers suggestive of joint infection.

                                                    



                                She underwent I D with liner exchange on 19.

                 



                                                                



                                1) On leonardo-op Ancef for now. Decrease dose given

 her reduced CrCl.                 



                                                                



                                                    2) Will need to d/w Dr Tovar

s plan for IV abx - note suggests he is in favor of

                                                    



                                slightly shorter course of IV abx followed kota howell by po as operative                 



                                        findings were minimal. If operative cxs 

are positive, will tailor abx to those 

                                        



                                results. She will have drug interactions with fl

ecainide and quinolones po.                 



                                Her reduced CrCl may also make abx (IV or po) ch

allenging.                 



                                                                



                                3) Possible PICC placement after d/w Dr Renae. 

She does have a pacemaker in                 



                                place which will limit IV access options.       

          



                                                                



                                Electronically Signed by Mary Helm MD 

on 19 at 2053                 



                                                                



                                RPT #:[id number]                 



                                ***END OF REPORT***                 



                                                                



                                                                

 

                2019 18:24:00-00:00 [id number] [facility]



                                 [address] [place] [address]                 



                                                                



                                                                



                                PATIENT NAME: MARIA G ARTEAGA ADMIT DATE:

 19                 



                                ACCOUNT NO: [id number] ROOM NO: Y.506         

        



                                MEDICAL RECORD NO: [id number] AGE: 68           

      



                                REPORT TYPE: CONSULTATION REPORT SEX: F         

        



                                                                



                                ADMITTING PHYSICIAN:Toan Renae MD         

        



                                ATTENDING PHYSICIAN:Toan Renae MD         

        



                                                                



                                                                



                                CONSULTATION DATE: 2019                 



                                                                



                                CONSULTING PHYSICIAN: Marco Antonio Vasquez MD   

              



                                                                



                                MEDICINE CONSULTATION                 



                                                                



                                ATTENDING PHYSICIAN: Toan Renae MD.     

            



                                                                



                                CONSULTING PHYSICIAN: Marco Antonio Vasquez MD.  

               



                                                                



                                                    REASON FOR CONSULTATION: Pos

toperative medical evaluation and management of the

                                                    



                                patient with multiple medical problems.         

        



                                                                



                                HISTORY OF PRESENT ILLNESS: The patient is a 68-

year-old white female with an                 



                                infected left total knee arthroplasty, who under

went removal of the infected                 



                                hardware and placement of an antibiotic spacer e

karla today. The patient                 



                                tolerated surgery well. She currently denies any

 chest pain, shortness of                 



                                                    breath, nausea, or vomiting.

 She has adequate pain control at the present time.

                                                    



                                                     She denies any lightheadedn

ess when getting up to walk earlier this afternoon.

                                                    



                                                                



                                        PAST MEDICAL HISTORY: Adult-onset diabet

es mellitus for 5 years. No history of 

                                        



                                complications. The patient also has hypertension

, obstructive sleep apnea,                 



                                asthma, chronic kidney disease stage III, atrial

 fibrillation, sick sinus                 



                                syndrome for which she underwent pacemaker place

ment, hypothyroidism, gout,                 



                                diverticulosis, hemorrhoids, iron deficiency ane

micheal and hot flashes. The                 



                                                    patient denies any coronary 

artery disease, congestive heart failure, history 

of                                                  



                                deep venous thrombosis.                 



                                                                



                                PAST SURGICAL HISTORY: Abdominal surgery. Knee a

rthroscopy. Cholecystectomy.                 



                                Bilateral total knee arthroplasties. . 

Fundoplication. Right kidney                 



                                surgery. Herniorrhaphy. Carpal tunnel release. C

ardiac ablation. Trigger                 



                                finger release.                 



                                                                



                                ALLERGIES: CODEINE, PENTAZOCINE, MEPERIDINE, XAR

ELTO.                 



                                                                



                                CURRENT MEDICATIONS: Tradjenta 5 mg daily, Eliqu

is 5 mg b.i.d. (on hold),                 



                                                    diltiazem  mg q.a.m., 

fenofibrate 145 mg daily, flecainide 100 mg b.i.d.,

                                                    



                                        irbesartan 300 mg q.p.m., metoprolol suc

cinate 150 mg q.a.m., sertraline 50 mg 

                                        



                                at bedtime, calcium citrate 1000 mg daily, furos

emide 40 mg every other day,                 



                                QVAR 40 mcg 2 puffs b.i.d. p.r.n. asthma, Synthr

oid 125 mcg q.a.m., metformin                 



                                500 mg b.i.d., allopurinol 100 mg b.i.d., jessy

ukast 10 mg q.a.m., vitamin D                 



                                1000 units 2 b.i.d.                 



                                                                



                                PATIENT NAME: MARIA G ARTEAGA ACCOUNT #: 

[id number]                 



                                                                



                                                                



                                                                



                                                                



                                FAMILY HISTORY: Mother had breast cancer.       

          



                                                                



                                SOCIAL HISTORY: The patient is a former school n

jaclyn. She does not smoke or                 



                                drink alcohol.                  



                                                                



                                                    REVIEW OF SYSTEMS: The patie

nt reports having intermittent bright red blood per

                                                    



                                                    rectum due to her hemorrhoid

s. She denies any exertional chest pain, dyspnea on

                                                    



                                exertion, orthopnea, PND, black tarry stools, or

 dysuria.                 



                                                                



                                PHYSICAL EXAMINATION:                 



                                GENERAL: Well-developed, obese white female in n

o apparent distress.                 



                                VITAL SIGNS: Pulse 80 and regular, respirations 

17, blood pressure 135/76,                 



                                temperature 97.5, oxygen saturation 93% on room 

air.                 



                                EYES: EOMI. PERRLA. Sclerae are anicteric.      

           



                                OROPHARYNX: Clear.                 



                                NECK: No adenopathy, thyromegaly, masses, tender

ness, JVD, or carotid bruits.                 



                                LUNGS: Clear to auscultation.                 



                                HEART: Regular rate and rhythm without murmurs, 

gallops, or rubs.                 



                                ABDOMEN: Obese. Bowel sounds present throughout.

 No hepatosplenomegaly,                 



                                masses, tenderness, bruits, or distention.      

           



                                EXTREMITIES: No clubbing, cyanosis, or edema. No

 calf or thigh swelling or                 



                                                    posterior tenderness bilater

ally. No Homans sign or palpable cords bilaterally.

                                                    



                                 No pedal lesions.                 



                                                                



                                LABORATORY DATA: Preoperative laboratories (2019), hemoglobin 11.2,                 



                                hematocrit 35.3, MCV 91. BUN is 32, creatinine 1

.7, GFR 29.8.                 



                                                                



                                IMPRESSION:                     



                                1. Adult-onset diabetes mellitus (type 2), contr

olled.                 



                                2. Atrial fibrillation. The patient is currently

 in a regular cardiac rhythm.                 



                                3. Hypertension. Blood pressure is well controll

ed at the present time.                 



                                        4. Obstructive sleep apnea. The patient 

has her CPAP machine available for use 

                                        



                                in the hospital.                 



                                5. Asthma, asymptomatic.                 



                                6. Chronic kidney disease stage III.            

     



                                7. Hypothyroidism.                 



                                8. Sick sinus syndrome. The patient is status po

st pacemaker placement.                 



                                9. Gout, asymptomatic.                 



                                10. Iron deficiency anemia.                 



                                11. Hemorrhoids.                 



                                        12. Status post removal of infected left

 total knee arthroplasty hardware with 

                                        



                                placement of antibiotic spacer. The patient is c

urrently stable.                 



                                                                



                                PLAN:                           



                                1. An 1800-calorie ADA, low-salt, low-fat diet. 

                



                                2. Sequential compression devices to both feet. 

                



                                3. Tomorrow morning, restart Eliquis 5 mg b.i.d.

                 



                                4. Continue physical therapy in a.m.            

     



                                5. Check H and H and chem-6 in a.m.             

    



                                6. Q.a.c. and at bedtime Accu-Cheks.            

     



                                7. Humalog moderate dose regimen sliding scale. 

                



                                8. Hold metformin if the patient's GFR is less t

[place] 30 tomorrow morning.                 



                                9. Hold diltiazem CD for systolic blood pressure

 less than 105 or heart rate                 



                                                                



                                PATIENT NAME: MARIA G ARTEAGA ACCOUNT #: 

[id number]                 



                                                                



                                                                



                                                                



                                less than 50.                   



                                                    10. Hold metoprolol succinat

e if systolic blood pressure less than 105 or heart

                                                    



                                rate less than 50.                 



                                11. Hold flecainide for heart rate less than 50.

                 



                                12. Hold irbesartan and furosemide if systolic b

lood pressure less than 120.                 



                                13. Albuterol unit dose via nebulizer q. 4 hours

 p.r.n. shortness of                 



                                breath/wheezing.                 



                                                                



                                Thank very much for this consultation. I will fo

llow patient with you during                 



                                her hospitalization.                 



                                                                



                                Dictated By: Marco Antonio Vasquez MD            

     



                                                                



                                WT: [facility]S                 



                                DD: 2019 18:24:42                 



                                DT: 2019 19:56:05                 



                                Conf#: [id number]/DID#: [id number]                 



                                                                



                                Authenticated by Marco Antonio Vasquez MD On 2019

 08:24:11 AM                 



                                                                



                                                                



                                                                



                                                                



                                                                



                                Electronically Signed by Marco Antonio Vasquez MD on  at 0824                 



                                                                



                                                                



                                                                



                                                                



                                                                



                                                                



                                                                



                                                                



                                                                



                                                                



                                                                



                                                                



                                                                



                                                                



                                                                



                                                                



                                                                



                                                                



                                                                



                                                                



                                                                



                                                                



                                                                



                                                                



                                                                



                                                                



                                                                



                                                                



                                                                



                                                                



                                PATIENT NAME: MARIA G ARTEAGA ACCOUNT #: 

[id number]                 

 

                2019 18:07:00-00:00                                 [facility] ([facility]               

  



                                Internal Medicine Prog. Note                 



                                REPORT#:[id number] REPORT STATUS: Signed         

        



                                DATE:19 TIME:                  



                                                                



                                PATIENT: MARIA G ARTEAGA UNIT #: [id number]

80                 



                                ACCOUNT#: [id number] [facility]        

         



                                : 49 AGE: 68 SEX: F ATTEND: Fidel Renae MD                 



                                ADM DT: 19 AUTHOR: Marco Antonio Vasquez MD       

          



                                                                



                                * ALL edits or amendments must be made on the el

[employer]/computer document *                 



                                                                



                                                                



                                Subjective                      



                                Comments:                       



                                Consult Note Dictated                 



                                                                



                                                                



                                Objective                       



                                                                



                                General                         



                                VS/I O:                         



                                Vital Signs                     



                                 Date Temp Pulse Resp B/P B/P Mean Pulse Ox FiO2

                 



                                  97.5-98.1 60-80 15-20 135-181/65-79 95.6 

 32                 



                                                                



                                Last Documented:                 



                                 Result Date Time                 



                                 O2 Delivery CPAP  1531                 



                                 B/P 135/76  1458                 



                                 B/P Mean 95.6  1458                 



                                 Temp 97.5  1458                 



                                  Pulse 80  1458                 



                                 Resp 17  1458                 



                                 Pulse Ox 98  1438                 



                                 FiO2 32  1438                 



                                 O2 Flow Rate [id number]  1438               

  



                                                                



                                Patient Weight                  



                                                                



                                Weight (lb): 217                 



                                Weight (oz): 2.49                 



                                Weight (kg): 98.43                 



                                                                



                                                                



                                Physical Exam                   



                                General appearance: alert, awake, no acute distr

ess                 



                                                                



                                Diagnosis, Assessment Plan                 



                                Problem List/A P:                 



                                 1. Type 2 diabetes mellitus without complicatio

ns                 



                                                                



                                 2. Essential (primary) hypertension            

     



                                                                



                                 3. Atrial fibrillation                 



                                                                



                                 4. SSS (sick sinus syndrome)                 



                                                                



                                 5. TEODORA (obstructive sleep apnea)               

  



                                                                



                                 6. Asthma                      



                                                                



                                 7. CKD (chronic kidney disease) stage 3, GFR 30

-59 ml/min                 



                                                                



                                 8. Infection of total left knee replacement    

             



                                                                



                                                                



                                Electronically Signed by Marco Antonio Vasquez MD on  at 1951                 



                                                                



                                RPT #:[id number]                 



                                ***END OF REPORT***                 



                                                                



                                                                

 

                2019 16:21:00-00:00 [id number]  [place] [address]                 



                                                                



                                                                



                                PATIENT NAME: MARIA G ARTEAGA ADMIT DATE:

 19                 



                                ACCOUNT NO: [id number] ROOM NO: [facility]         

        



                                MEDICAL RECORD NO: [id number] AGE: 68           

      



                                REPORT TYPE: OPERATIVE REPORT SEX: F            

     



                                                                



                                ADMITTING PHYSICIAN:Toan Renae MD         

        



                                ATTENDING PHYSICIAN:Toan Renae MD         

        



                                                                



                                                                



                                OPERATION DATE: 2019                 



                                                                



                                PREOPERATIVE DIAGNOSES:                 



                                1. Degenerative joint Infected left knee arthrop

lasty, acute infection.                 



                                2. Cutaneous cicatrix, left knee.               

  



                                                                



                                POSTOPERATIVE DIAGNOSES:                 



                                1. Degenerative joint Infected left knee arthrop

lasty, acute infection.                 



                                2. Cutaneous cicatrix, left knee.               

  



                                                                



                                PROCEDURES:                     



                                1. I and D with poly change, revision one compon

ent, left knee.                 



                                2. Excision of cutaneous cicatrix left knee, 22 

cm.                 



                                                                



                                SURGEON: Toan Renae MD                 



                                                                



                                ASSISTANT: Benji Mccloud CST/RICARDO            

     



                                                                



                                ANESTHESIA: General anesthesia.                 



                                                                



                                ESTIMATED BLOOD LOSS: 25 mL.                 



                                                                



                                DRAINS: None.                   



                                                                



                                SPECIMENS: Three sets of tissue for culture.    

             



                                                                



                                COMPLICATIONS: None.                 



                                                                



                                INDICATIONS FOR SURGERY: Acute onset of left kne

e pain while the patient was                 



                                being treated for diverticulitis with previous s

uccessful left knee                 



                                arthroplasty. Elevated ESR and CRP. Knee aspirat

ion demonstrated elevated WBC                 



                                        with 90% polys. Synovasure alpha defensi

n analysis was positive. Cultures were 

                                        



                                negative. Treatment options were discussed with 

the patient and her .                 



                                                    There was improvement in zuly

n with oral antibiotics that had been prescribed 

for                                                 



                                diverticulitis. Due to the acute onset of pain a

nd positive aspiration,                 



                                inflammatory markers consistent with infection, 

decision was made to proceed                 



                                with surgical irrigation and debridement, and po

lyethylene change. Treatment                 



                                                    options were discussed with 

the patient and her  who voiced 

understanding                                       



                                                    of the options and the plann

ed procedure. They voiced understanding of the risk

                                                    



                                                    of persistence, worsening or

 recurrence of infection, damage to nerves or blood

                                                    



                                vessels with loss of function, mechanical failur

e or loosening with need for                 



                                                    revision surgery, blood clot

s possibly going to the lungs, possibly resulting 

in                                                  



                                                                



                                PATIENT NAME: MARIA G ARTEAGA ACCOUNT #: 

[id number]                 



                                                                



                                                                



                                                                



                                death. No warranty or guarantee as to the outcom

e was made.                 



                                                                



                                        FINDINGS: Components well fixed with min

imal wear of the [id number] portion 

                                        



                                        of the patellar implant in a transverse 

manner, consistent with impingement on 

                                        



                                the tibial component with a patella baja. Femora

l and tibial components well                 



                                positioned and well fixed and appropriately size

d. No significant fluid upon                 



                                entering the knee joint. No significant inflamma

tion.                 



                                                                



                                                    PROCEDURE IN DETAIL: There w

as a curved anterior medial 22 cm incision that was

                                                    



                                widened. Due to widening of the scar and concern

 for infection, decision was                 



                                made to perform removal of the old scarred incis

ion. After she was properly                 



                                identified in the preoperative holding area and 

all her and her 's                 



                                questions were answered and the surgical site wa

s marked under her direction,                 



                                                    she was taken to the operati

ng room. Following establishment of anesthesia, the

                                                    



                                left leg was prepped and draped sterilely. Time-

out was initiated by the                 



                                surgeon and it was verified that all necessary e

quipment and instruments were                 



                                        available and had been properly steriliz

ed and all potentially needed implants 

                                        



                                were available. The old anterior medial incision

 was excised. Mid vastus                 



                                approach was performed. Findings as described ab

ove. Anterior synovectomy was                 



                                performed. The tibial polyethylene insert was re

moved without difficulty.                 



                                There was no damage on the tibial insert. This w

as an 8-mm thick insert for                 



                                        size 3 fixed-bearing DePuy P[employer] pos

terior-stabilized implant. A posterior 

                                        



                                synovectomy was then performed. The knee was the

n thoroughly irrigated,                 



                                initially it was soaked with Betadine solution, 

then irrigated with 9 L of                 



                                solution with bacitracin using pulsatile lavage.

 The tourniquet was released,                 



                                                    hemostasis was obtained and 

a new tibial polyethylene insert was placed, size 3

                                                    



                                x 8 mm thick posterior-stabilized. Capsule was r

epaired using #1 PDS and #1                 



                                Quill. Subcutaneous tissues were meticulously cl

osed in layers using                 



                                monofilament suture. Skin was closed using stapl

es.                 



                                                                



                                POSTOPERATIVE PLAN: Weightbearing as tolerated. 

Infectious disease                 



                                consultation. Due to intraoperative appearance o

f minimal inflammation and no                 



                                significant fibrinous exudate, a short course of

 intravenous antibiotics                 



                                followed by oral antibiotics is felt to be a karin

sonable and will be discussed                 



                                with infectious disease consultants.            

     



                                                                



                                Dictated By: Toan Renae MD               

  



                                                                



                                WT: OP:Y.HIM/MERY/MINERVA                 



                                DD: 2019 16:21:52                 



                                DT: 2019 17:07:36                 



                                Conf#: [id number]/DID#: [id number]                 



                                                                



                                Authenticated by Toan Renae MD On 09

/10/2019 08:06:09 AM                 



                                                                



                                                                



                                                                



                                                                



                                                                



                                Electronically Signed by Toan Renae MD on

 09/10/19 at 0806                 



                                                                



                                                                



                                                                



                                                                



                                                                



                                PATIENT NAME: MARIA G ARTEAGA ACCOUNT #: 

[id number]                 

 

                2019 12:39:00-00:00                                 [facility] ([facility]               

  



                                Brief Op Note                   



                                REPORT#:[id number] REPORT STATUS: Signed         

        



                                DATE:19 TIME: 1239                 



                                                                



                                PATIENT: MARIA G ARTEAGA UNIT #: [id number]

80                 



                                ACCOUNT#: [id number]  ROOM/BED: [place] [address]      

           



                                : 49 AGE: 68 SEX: F ATTEND: Fidel Renae MD                 



                                ADM DT: 19 AUTHOR: Toan Renae MD    

             



                                                                



                                * ALL edits or amendments must be made on the el

[employer]/computer document *                 



                                                                



                                                                



                                Op/Inv Proc Note - Brief                 



                                Pre-procedure diagnosis:                 



                                acute infection lt tka, cutaneous cicatrix lt kn

ee                 



                                Post-procedure diagnosis: same as pre procedure 

dx                 



                                Procedures performed:                 



                                i d, poly change lt knee, excision cutaneous cic

atrix lt knee                 



                                Primary Surgeon:                 



                                calvin                          



                                Assistant(s): cy                 



                                Anesthesia: general anesthesia                 



                                Findings:                       



                                minimal fluid or synovitis, implants stable     

            



                                Complications: none                 



                                Estimated blood loss in ml's: 25cc              

   



                                Specimens removed/altered: tissue for cx x3     

            



                                                                



                                Electronically Signed by Toan Renae MD on 

19 at 1242                 



                                                                



                                [facility] #:[id number]                 



                                ***END OF REPORT***

## 2023-06-27 VITALS — TEMPERATURE: 97.8 F | OXYGEN SATURATION: 99 %

## 2023-06-27 VITALS — SYSTOLIC BLOOD PRESSURE: 138 MMHG | DIASTOLIC BLOOD PRESSURE: 70 MMHG

## 2023-06-27 LAB — INR BLD: 1.32

## 2023-06-27 NOTE — RAD REPORT
EXAM DESCRIPTION:

  XR Chest, 1 View

 

CLINICAL HISTORY:  The patient is 72 years old and is Female; DYSPNEA

 

TECHNIQUE:  Frontal view of the chest.

 

COMPARISON:  No relevant prior studies available.

 

FINDINGS:  LUNGS:   Unremarkable.   No consolidation.

  PLEURAL SPACE:   Unremarkable.   No pneumothorax.

  HEART:   The cardiac silhouette is enlarged.

  MEDIASTINUM:   Unremarkable.

  BONES/JOINTS:   Multilevel degenerative change of the spine is present.

  VASCULATURE:   Prominence of central vasculature is present.

  TUBES, LINES AND DEVICES:   Left-sided pacemaker is noted.

  UPPER ABDOMEN:   Unremarkable as visualized.

 

IMPRESSION:  Findings suggest mild vascular congestion.

 

Electronically signed by:   Gertrude Cade MD   6/26/2023 11:35 PM CDT Workstation: [id number]

 

 

 

Due to temporary technical issues with the PACS/Fluency reporting system, reports are being signed by
 the in house radiologists without review as a courtesy to insure prompt reporting. The interpreting 
radiologist is fully responsible for the content of the report.

## 2023-06-27 NOTE — EDPHYS
Physician Documentation                                                                           

 [facility]                                                                 

Name: Jada Vazquez                                                                             

Age: 72 yrs                                                                                       

Sex: Female                                                                                       

: 1950                                                                                   

MRN: [id number]                                                                                   

Arrival Date: 2023                                                                          

Time: 21:19                                                                                       

Account#: [id number]                                                                            

Bed 8                                                                                             

Private MD:                                                                                       

ED Physician Estiven Connell                                                                      

HPI:                                                                                              

                                                                                             

01:08 This 72 yrs old  Female presents to ER via Ambulatory with complaints of Fall  jair 

      Injury.                                                                                     

01:08 Details of fall: The patient fell from an upright position. Onset: The symptoms/episode jair 

      began/occurred just prior to arrival. Associated injuries: The patient sustained injury     

      to the head, neck injury, upper back injury, injury to the low back. Severity of            

      symptoms: At their worst the symptoms were mild, in the emergency department the            

      symptoms are unchanged. The patient has not experienced similar symptoms in the past.       

                                                                                                  

Historical:                                                                                       

- Allergies:                                                                                      

                                                                                             

22:59 Codeine;                                                                                kl  

22:59 Demerol;                                                                                kl  

22:59 Talwin (Anaphylaxis);                                                                   kl  

22:59 Xarelto;                                                                                kl  

- PMHx:                                                                                           

22:59 Atrial Fib; Depression; Diabetes - NIDDM; Hypertension; Hypothyroidism; Reactive airway kl  

      disease;                                                                                    

- PSHx:                                                                                           

22:59 pacemaker; Cholecystectomy; knee replacement; hernia repair;                            kl  

                                                                                                  

- Immunization history:: Adult Immunizations not up to date.                                      

- Social history:: Smoking status: Patient denies any tobacco usage or history of.                

                                                                                                  

                                                                                                  

ROS:                                                                                              

                                                                                             

01:09 Constitutional: Negative for fever, chills, and weight loss, Eyes: Negative for injury, jair 

      pain, redness, and discharge, ENT: Negative for injury, pain, and discharge, Neck:          

      Negative for injury, pain, and swelling, Cardiovascular: Negative for chest pain,           

      palpitations, and edema, Respiratory: Negative for shortness of breath, cough,              

      wheezing, and pleuritic chest pain, Abdomen/GI: Negative for abdominal pain, nausea,        

      vomiting, diarrhea, and constipation, Back: Negative for injury and pain, : Negative      

      for injury, bleeding, discharge, and swelling, MS/Extremity: Negative for injury and        

      deformity, Skin: Negative for injury, rash, and discoloration, Psych: Negative for          

      depression, anxiety, suicide ideation, homicidal ideation, and hallucinations,              

      Allergy/Immunology: Negative for hives, rash, and allergies, Endocrine: Negative for        

      neck swelling, polydipsia, polyuria, polyphagia, and marked weight changes,                 

      Hematologic/Lymphatic: Negative for swollen nodes, abnormal bleeding, and unusual           

      bruising.                                                                                   

      Neuro: Positive for headache.                                                               

                                                                                                  

Exam:                                                                                             

01:09 Constitutional:  This is a well developed, well nourished patient who is awake, alert,  jair 

      and in no acute distress. Head/Face:  Normocephalic, atraumatic. Eyes:  Pupils equal        

      round and reactive to light, extra-ocular motions intact.  Lids and lashes normal.          

      Conjunctiva and sclera are non-icteric and not injected.  Cornea within normal limits.      

      Periorbital areas with no swelling, redness, or edema. ENT:  Nares patent. No nasal         

      discharge, no septal abnormalities noted.  Tympanic membranes are normal and external       

      auditory canals are clear.  Oropharynx with no redness, swelling, or masses, exudates,      

      or evidence of obstruction, uvula midline.  Mucous membranes moist. Neck:  Trachea          

      midline, no thyromegaly or masses palpated, and no cervical lymphadenopathy.  Supple,       

      full range of motion without nuchal rigidity, or vertebral point tenderness.  No            

      Meningismus. Chest/axilla:  Normal chest wall appearance and motion.  Nontender with no     

      deformity.  No lesions are appreciated. Cardiovascular:  Regular rate and rhythm with a     

      normal S1 and S2.  No gallops, murmurs, or rubs.  Normal PMI, no JVD.  No pulse             

      deficits. Respiratory:  Lungs have equal breath sounds bilaterally, clear to                

      auscultation and percussion.  No rales, rhonchi or wheezes noted.  No increased work of     

      breathing, no retractions or nasal flaring. Abdomen/GI:  Soft, non-tender, with normal      

      bowel sounds.  No distension or tympany.  No guarding or rebound.  No evidence of           

      tenderness throughout. Back:  No spinal tenderness.  No costovertebral tenderness.          

      Full range of motion. Female :  Normal external genitalia. Skin:  Warm, dry with          

      normal turgor.  Normal color with no rashes, no lesions, and no evidence of cellulitis.     

      MS/ Extremity:  Pulses equal, no cyanosis.  Neurovascular intact.  Full, normal range       

      of motion. Neuro:  Awake and alert, GCS 15, oriented to person, place, time, and            

      situation.  Cranial nerves II-XII grossly intact.  Motor strength 5/5 in all                

      extremities.  Sensory grossly intact.  Cerebellar exam normal.  Normal gait. Psych:         

      Awake, alert, with orientation to person, place and time.  Behavior, mood, and affect       

      are within normal limits.                                                                   

01:09 ECG was reviewed by the Attending Physician.                                                

                                                                                                  

Vital Signs:                                                                                      

                                                                                             

22:56  / 71; Pulse 82; Resp 20; Temp 98(O); Pulse Ox 96% on R/A; Weight 99.79 kg;       kl  

      Height 5 ft. 2 in. ; Pain 6/10;                                                             

23:24  / 69; Pulse Ox 94% on R/A;                                                         

                                                                                             

00:47  / 68; Pulse 67; Resp 20; Pulse Ox 96% on R/A;                                    kl  

03:26  / 70; Pulse 82; Resp 20; Pulse Ox 96% on R/A;                                      

04:39 Pulse 78; Resp 20; Temp 97.8(O); Pulse Ox 99% ;                                           

                                                                                             

22:56 Body Mass Index 40.24 (99.79 kg, 157.48 cm)                                               

                                                                                             

22:56 Pain Scale: Adult                                                                         

                                                                                                  

[place] Coma Score:                                                                               

01:09 Eye Response: spontaneous(4). Motor Response: obeys commands(6). Verbal Response:       jair 

      oriented(5). Total: 15.                                                                     

                                                                                                  

MDM:                                                                                              

                                                                                             

21:50 Patient medically screened.                                                             [facility] 

                                                                                             

02:54 Differential diagnosis: closed head injury, contusion, fracture, laceration, multiple   jair 

      trauma, sprain, strain. Differential diagnosis: appendicitis, bowel obstruction,            

      coronary artery disease, Cholelithiasis, diverticulitis, gastritis, Hepatitis,              

      Irritable bowel syndrome, Mesenteric ischemia or infarction, non-specific abd pain,         

      pancreatitis, Peptic Ulcer Disease, Pyelonephritis, Ureterolithiasis, urinary tract         

      infection. Data reviewed: vital signs, nurses notes, lab test result(s), EKG,               

      radiologic studies, CT scan, plain films. Consideration of Admission/Observation            

      Escalation of care including admission/observation considered. I considered the             

      following discharge prescriptions or medication management in the emergency department      

      Medications were administered in the Emergency Department. See MAR. Test considered but     

      Not performed: Ultrasound NO ABD USG. Care significantly affected by the following          

      chronic conditions: Diabetes, Hypertension, Obesity, RENAL FAILURE , A FIB.                 

                                                                                                  

                                                                                             

22:24 Order name: Basic Metabolic Panel; Complete Time: 23:56                                 [facility] 

                                                                                             

22:24 Order name: CBC with Diff; Complete Time: 01:05                                         [facility] 

                                                                                             

22:24 Order name: LFT's; Complete Time: 23:56                                                 [facility] 

                                                                                             

22:24 Order name: Magnesium; Complete Time: 23:56                                             [facility] 

                                                                                             

22:24 Order name: NT PRO-BNP; Complete Time: 23:56                                            [facility] 

                                                                                             

22:24 Order name: PT-INR; Complete Time: 01:05                                                [facility] 

                                                                                             

22:24 Order name: Troponin HS; Complete Time: 23:56                                           [facility] 

                                                                                             

22:24 Order name: Lipase; Complete Time: 23:56                                                [facility] 

                                                                                             

22:24 Order name: Urinalysis w/ reflexes; Complete Time: 01:57                                [facility] 

                                                                                             

23:55 Order name: AMMONIA; Complete Time: 01:05                                               [facility] 

                                                                                             

01:50 Order name: Urine Culture                                                               [facility]

                                                                                             

22:24 Order name: XRAY Chest (1 view)                                                         [facility] 

                                                                                             

22:28 Order name: Head C Spine Cap Wo Con                                                     [facility]

                                                                                             

22:24 Order name: EKG; Complete Time: 22:25                                                   [facility] 

                                                                                             

22:24 Order name: Cardiac monitoring                                                          [facility] 

                                                                                             

22:24 Order name: EKG - Nurse/Tech                                                            [facility] 

                                                                                             

22:24 Order name: IV Saline Lock; Complete Time: 23:25                                        [facility] 

                                                                                             

22:24 Order name: Labs collected and sent; Complete Time: 23:25                               [facility] 

                                                                                             

22:24 Order name: O2 Per Protocol; Complete Time: 23:25                                       [facility] 

                                                                                             

22:24 Order name: O2 Sat Monitoring; Complete Time: 23:25                                     [facility] 

                                                                                                  

EC:09 Rate is 64 beats/min. Rhythm is regular. QRS Axis is Normal. CA interval is normal. QRS jair 

      interval is normal. QT interval is normal. No Q waves. T waves are Normal. No ST            

      changes noted. Clinical impression: Abnormal EKG without significant change.                

      Interpreted by me. Reviewed by me.                                                          

                                                                                                  

Administered Medications:                                                                         

                                                                                             

23:50 Drug: NS 0.9% IV 1000 ml Route: IV; Rate: 125 ml/hr; Site: right upper arm;               

                                                                                             

03:27 Follow up: IV Status: Order to discontinue infusion; IV Intake: 250ml                     

00:28 Drug: morphine IVP or IV 2 mg Route: IVP; Infused Over: 4 mins; Site: right upper arm;  kl  

00:47 Follow up: Response: No adverse reaction; Marked relief of symptoms                     kl  

02:15 Not Given (Duplicate Order): Cefdinir  mg PO once                                 jair 

02:29 Drug: Rocephin IV 1 grams Route: IV; Rate: per protocol; Site: right upper arm;         kl  

03:27 Follow up: Response: No adverse reaction                                                kl  

02:30 Drug: Flomax PO 0.4 mg Route: PO;                                                       kl  

03:28 Follow up: Response: No adverse reaction                                                kl  

02:30 Drug: Potassium PO Effervescent Tablet 25 mEq Route: PO;                                kl  

03:28 Follow up: Response: No adverse reaction                                                kl  

03:14 Drug: morphine IVP or IV 2 mg Route: IVP; Infused Over: 4 mins; Site: right upper arm;  kl  

03:29 Follow up: Response: No adverse reaction; Marked relief of symptoms                     kl  

03:15 Drug: levofloxacin IVPB 500 mg Volume: 100 ml; Route: IVPB; Infused Over: 60 mins;      kl  

      Site: right upper arm;                                                                      

04:25 Follow up: IV Status: Completed infusion; IV Intake: 100ml                              kl  

                                                                                                  

                                                                                                  

Disposition Summary:                                                                              

23 02:05                                                                                    

Transfer Ordered                                                                                  

      Transfer Location: [facility]                                jair 

      Reason: Higher level of care                                                            jair 

      Condition: Fair                                                                         jair 

      Problem: new                                                                            jair 

      Symptoms: have improved                                                                 jair 

      Accepting Physician: to [facility] tele(23 04:48)                                        kl  

      Diagnosis                                                                                   

        - Hydronephrosis with renal and ureteral calculous obstruction - 1 cm distal right    jair 

        - Fall on same level, unspecified                                                     jair 

        - Obesity due to excess calories                                                      jair 

        - Hypokalemia                                                                         jair 

        - Acute kidney failure, unspecified - on chronic                                      jair 

        - Other cirrhosis of liver                                                            jair 

        - Other ascites                                                                       jair 

      Discharge Instructions:                                                                     

        - Discharge Summary Sheet                                                             jair 

        - Atrial Fibrillation                                                                 jair 

        - Potassium Content of Foods                                                          jair 

        - Kidney Stones                                                                       jair 

        - Kidney Stones, Easy-to-Read                                                         jair 

        - Hydronephrosis                                                                      jair 

        - Dietary Guidelines to Help Prevent Kidney Stones                                    jair 

        - Atrial Fibrillation, Easy-to-Read                                                   jair 

        - Hypokalemia                                                                         jair 

      Forms:                                                                                      

        - Medication Reconciliation Form                                                      jair 

        - SBAR form                                                                           jair 

      Prescriptions:                                                                              

        - Flomax 0.4 mg Oral capsule                                                               

            - take 1 capsule by ORAL route every 24 hours; 14 capsule; Refills: 0, Product    jair 

      Selection Permitted                                                                         

        - cefdinir 300 mg Oral capsule                                                             

            - take 1 capsule by ORAL route 2 times per day; 14 capsule; Refills: 0, Product   jair 

      Selection Permitted                                                                         

        - Zofran 4 mg Oral Tablet                                                                  

            - take 1 tablet by ORAL route every 12 hours As needed; 20 tablet; Refills: 0,    jair 

      Product Selection Permitted                                                                 

Signatures:                                                                                       

Dispatcher [employer]                           Shruthi Saenz RN RN kl Anderson, Corey, MD MD cha                                                  

                                                                                                  

Corrections: (The following items were deleted from the chart)                                    

                                                                                             

22:27 21:52 Head C Spine MPR Wo Con+CT.RAD.BRZ ordered. RICARDOMS                                  EYM

                                                                                             

02:06 02:05 to [facility] [facility] [place] jair adam 

04:48 02:06 to [facility]                                                                   crispin  

                                                                                                  

**************************************************************************************************

## 2023-06-27 NOTE — RAD REPORT
EXAM DESCRIPTION:  CT Head and Cervical Spine Without Intravenous Contrast

 

CLINICAL HISTORY:  The patient is 72 years old and is Female; PAIN

 

TECHNIQUE:  Axial computed tomography images of the head/brain and cervical spine without intravenous
 contrast.   Sagittal and coronal reformatted images were created and reviewed.   This CT exam was pe
rformed using one or more of the following dose reduction techniques:   automated exposure control, a
djustment of the mA and/or kV according to patient size, and/or use of iterative reconstruction techn
ique.

 

COMPARISON:  No relevant prior studies available.

 

FINDINGS:  BRAIN:   Unremarkable.   No hemorrhage.   No significant white matter disease.   No edema.


  VENTRICLES:   Unremarkable.   No ventriculomegaly.

  SKULL:   No acute fracture.

  SINUSES:   Unremarkable as visualized.   No acute sinusitis.

  MASTOID AIR CELLS:   Unremarkable as visualized.   No mastoid effusion.

  VERTEBRAE:   See below.

  DISCS/SPINAL CANAL/NEURAL FORAMINA:   Minimal intervertebral disc space narrowing with anterior ost
eophyte formation is present most prominent at C5-C6 and C6-C7. Minimal facet arthropathy of the uppe
r cervical spine is noted.

  SOFT TISSUES:   The soft tissues are normal.

  LUNG APICES:   Unremarkable as visualized.

 

IMPRESSION:  1.   No acute intracranial findings.

2.   Mild spondylosis of the cervical spine without acute findings.

_______________________________________________

 

EXAM DESCRIPTION:  CT Chest, Abdomen and Pelvis Without Intravenous Contrast

 

CLINICAL HISTORY:  The patient is 72 years old and is Female; PAIN

 

TECHNIQUE:  Axial computed tomography images of the chest, abdomen and pelvis without intravenous con
trast.   Sagittal and coronal reformatted images were created and reviewed.   This CT exam was perfor
med using one or more of the following dose reduction techniques:   automated exposure control, adjus
tment of the mA and/or kV according to patient size, and/or use of iterative reconstruction technique
.

 

COMPARISON:  No relevant prior studies available.

 

FINDINGS:  CHEST:

  LUNGS:   The lungs are clear of focal opacity, mass, or consolidation.

  PLEURAL SPACE:   Unremarkable.   No significant effusion.   No pneumothorax.

  HEART:   The heart is enlarged. There is no pericardial effusion.

ABDOMEN:

  LIVER:   The liver demonstrates a slightly nodular contour.

  GALLBLADDER AND BILE DUCTS:   Surgical clips are present in the right upper quadrant, consistent wi
th previous cholecystectomy.

  PANCREAS:   Fatty infiltration of the pancreas is present.   No ductal dilation.

  SPLEEN:   The spleen is enlarged.

  ADRENALS:   Unremarkable.   No mass.

  KIDNEYS AND URETERS:   Edema of the right kidney with moderate right hydroureteronephrosis is prese
nt secondary to a 1 cm distal right ureteral calculus.   The left kidney is unremarkable without hydr
onephrosis or hydroureter.

  STOMACH AND BOWEL:   Postsurgical changes the level of the GE junction is noted.   Stomach is decom
pressed. The small bowel is relatively normal in caliber. Stool is present throughout colon. There is
 no mucosal thickening or evidence of obstruction.   A few scattered colonic diverticula are noted wi
thout surrounding inflammation.

PELVIS:

  APPENDIX:   No findings to suggest acute appendicitis.

  BLADDER:   The bladder is moderately distended.   No stones.

  REPRODUCTIVE:   Unremarkable as visualized.

CHEST, ABDOMEN and PELVIS:

  INTRAPERITONEAL SPACE:   Small volume ascites is present throughout the abdomen and pelvis.   No fr
ee air.

  BONES/JOINTS:   There is no acute fracture of the visualized axial and appendicular skeleton. The v
ertebral body heights and alignment are maintained. Multilevel degenerative change of the spine is pr
esent.

  SOFT TISSUES:   The soft tissues are normal.

  VASCULATURE:   Unremarkable.   No aortic aneurysm.

  LYMPH NODES:   Unremarkable. No enlarged lymph nodes.

  TUBES, LINES AND DEVICES:   A left-sided pacemaker is noted with the leads in the right atrial appe
ndage and right ventricle.

 

IMPRESSION:  1.   No evidence of solid organ injury or traumatic bony findings on this noncontrasted 
CT of the chest, abdomen, and pelvis.

2.   Edema of the right kidney with moderate right hydroureteronephrosis is present secondary to a 1 
cm distal right ureteral calculus.

3.   Cirrhotic liver, splenomegaly, and ascites.

 

Electronically signed by:   Gertrude Cade MD   6/26/2023 11:43 PM CDT Workstation: [id number]

 

 

 

 

Due to temporary technical issues with the PACS/Fluency reporting system, reports are being signed by
 the in house radiologists without review as a courtesy to insure prompt reporting. The interpreting 
radiologist is fully responsible for the content of the report.

## 2023-06-27 NOTE — EKG
Test Date:    2023-06-26               Test Time:    23:33:30

Technician:   ISREAL                                    

                                                     

MEASUREMENT RESULTS:                                       

Intervals:                                           

Rate:         64                                     

MS:                                                  

QRSD:         162                                    

QT:           514                                    

QTc:          530                                    

Axis:                                                

P:                                                   

MS:                                                  

QRS:          -74                                    

T:            91                                     

                                                     

INTERPRETIVE STATEMENTS:                                       

                                                     

Undetermined rhythm

Left axis deviation

Nonspecific intraventricular block

Lateral infarct, age undetermined

Inferior infarct, age undetermined

Abnormal ECG

Compared to ECG 08/12/2019 14:11:13

Myocardial infarct finding now present

Sinus rhythm no longer present

First degree AV block no longer present

Left bundle-branch block no longer present



Electronically Signed On 06-27-23 20:32:55 CDT by Rafiq Meyers

## 2023-06-27 NOTE — ER
Nurse's Notes                                                                                     

 [facility]                                                                 

Name: Jada Vazquez                                                                             

Age: 72 yrs                                                                                       

Sex: Female                                                                                       

: 1950                                                                                   

MRN: [id number]                                                                                   

Arrival Date: 2023                                                                          

Time: 21:19                                                                                       

Account#: [id number]                                                                            

Bed 8                                                                                             

Private MD:                                                                                       

Diagnosis: Hydronephrosis with renal and ureteral calculous obstruction-1 cm distal right;Fall on 

  same level, unspecified;Obesity due to excess calories;Hypokalemia;Acute kidney                 

  failure, unspecified-on chronic;Other cirrhosis of liver;Other ascites                          

                                                                                                  

Presentation:                                                                                     

                                                                                             

22:56 Chief complaint: Patient states: bent over to  object from floor fell over and   kl  

      hit top of head on bric fireplace negative LOC takes Eloquist. Coronavirus screen:          

      Vaccine status: Patient reports receiving the 2nd dose of the covid vaccine. Ebola          

      Screen: Patient negative for fever greater than or equal to 101.5 degrees Fahrenheit,       

      and additional compatible Ebola Virus Disease symptoms. Initial Sepsis Screen: Does the     

      patient meet any 2 criteria? No. Patient's initial sepsis screen is negative. Does the      

      patient have a suspected source of infection? No. Patient's initial sepsis screen is        

      negative. Risk Assessment: Do you want to hurt yourself or someone else? Patient            

      reports no desire to harm self or others.                                                   

22:56 Method Of Arrival: Ambulatory                                                           kl  

22:56 Acuity: JOHNNY 3                                                                           kl  

                                                                                                  

Triage Assessment:                                                                                

23:01 General: Appears uncomfortable, Behavior is calm, cooperative. Pain: Complains of pain  kl  

      in base of the skull, left side of the back of head and chest Pain currently is 6 out       

      of 10 on a pain scale. at worst was 10 out of 10 on a pain scale. EENT: No deficits         

      noted. Neuro: No deficits noted. Cardiovascular: No deficits noted. Respiratory: No         

      deficits noted. GI: No deficits noted. GI: No signs and/or symptoms were reported           

      involving the gastrointestinal system. : No deficits noted. No signs and/or symptoms      

      were reported regarding the genitourinary system. Derm: No deficits noted.                  

                                                                                                  

Historical:                                                                                       

- Allergies:                                                                                      

22:59 Codeine;                                                                                kl  

22:59 Demerol;                                                                                kl  

22:59 Talwin (Anaphylaxis);                                                                   kl  

22:59 Xarelto;                                                                                kl  

- PMHx:                                                                                           

22:59 Atrial Fib; Depression; Diabetes - NIDDM; Hypertension; Hypothyroidism; Reactive airway kl  

      disease;                                                                                    

- PSHx:                                                                                           

22:59 pacemaker; Cholecystectomy; knee replacement; hernia repair;                            kl  

                                                                                                  

- Immunization history:: Adult Immunizations not up to date.                                      

- Social history:: Smoking status: Patient denies any tobacco usage or history of.                

                                                                                                  

                                                                                                  

Screenin:23 [facility] ED Fall Risk Assessment (Adult) History of falling in the last 3 months,       kl  

      including since admission Yes- single mechanical fall (1 pt) Confusion or                   

      Disorientation No (0 pts) Intoxicated or Sedated No (0 pts) Impaired Gait Yes (1 pt)        

      Mobility Assist Device Used No (0 pt) Altered Elimination No (0 pt) Score/Fall Risk         

      Level 0 - 2 = Low Risk Oriented to surroundings, Maintained a safe environment. Abuse       

      screen: Denies threats or abuse. Nutritional screening: No deficits noted. Tuberculosis     

      screening: No symptoms or risk factors identified.                                          

                                                                                                  

Assessment:                                                                                       

23:23 Reassessment: Patient appears in no apparent distress at this time. Patient and/or      kl  

      family updated on plan of care and expected duration. Pain level reassessed. Patient is     

      alert, oriented x 3, equal unlabored respirations, skin warm/dry/pink.                      

                                                                                             

00:47 Reassessment: Patient appears in no apparent distress at this time. Patient and/or      kl  

      family updated on plan of care and expected duration. Pain level reassessed. Patient        

      states feeling better. Patient states symptoms have improved.                               

                                                                                                  

Vital Signs:                                                                                      

                                                                                             

22:56  / 71; Pulse 82; Resp 20; Temp 98(O); Pulse Ox 96% on R/A; Weight 99.79 kg;         

      Height 5 ft. 2 in. ; Pain 6/10;                                                             

23:24  / 69; Pulse Ox 94% on R/A;                                                       kl  

                                                                                             

00:47  / 68; Pulse 67; Resp 20; Pulse Ox 96% on R/A;                                    kl  

03:26  / 70; Pulse 82; Resp 20; Pulse Ox 96% on R/A;                                    kl  

04:39 Pulse 78; Resp 20; Temp 97.8(O); Pulse Ox 99% ;                                         kl  

                                                                                             

22:56 Body Mass Index 40.24 (99.79 kg, 157.48 cm)                                               

                                                                                             

22:56 Pain Scale: Adult                                                                         

                                                                                                  

Tayler Coma Score:                                                                               

01:09 Eye Response: spontaneous(4). Motor Response: obeys commands(6). Verbal Response:       jair 

      oriented(5). Total: 15.                                                                     

                                                                                                  

ED Course:                                                                                        

                                                                                             

21:23 Patient arrived in ED.                                                                  ja2 

21:50 Estiven Connell MD is Attending Physician.                                             jair 

22:46 Head C Spine Cap Wo Con In Process Unspecified.                                         EDMS

22:50 XRAY Chest (1 view) In Process Unspecified.                                             EDMS

22:59 Triage completed.                                                                       kl  

23:22 Inserted saline lock: 22 gauge in right upper arm, using aseptic technique. ,using      kl  

      aseptic technique. diffusic Blood collected.                                                

23:24 Patient has correct armband on for positive identification. Placed in gown. Bed in low  kl  

      position. Call light in reach. Side rails up X2. Adult w/ patient. Pulse ox on. NIBP on.    

23:25 Basic Metabolic Panel Sent.                                                             kl  

23:25 CBC with Diff Sent.                                                                     kl  

23:25 LFT's Sent.                                                                             kl  

23:25 Magnesium Sent.                                                                         kl  

23:25 NT PRO-BNP Sent.                                                                        kl  

23:26 PT-INR Sent.                                                                            kl  

23:26 Troponin HS Sent.                                                                         

                                                                                             

00:28 AMMONIA Sent.                                                                           kl  

                                                                                                  

Administered Medications:                                                                         

                                                                                             

23:50 Drug: NS 0.9% IV 1000 ml Route: IV; Rate: 125 ml/hr; Site: right upper arm;               

                                                                                             

03:27 Follow up: IV Status: Order to discontinue infusion; IV Intake: 250ml                   kl  

00:28 Drug: morphine IVP or IV 2 mg Route: IVP; Infused Over: 4 mins; Site: right upper arm;  kl  

00:47 Follow up: Response: No adverse reaction; Marked relief of symptoms                     kl  

02:15 Not Given (Duplicate Order): Cefdinir  mg PO once                                 jair 

02:29 Drug: Rocephin IV 1 grams Route: IV; Rate: per protocol; Site: right upper arm;         kl  

03:27 Follow up: Response: No adverse reaction                                                kl  

02:30 Drug: Flomax PO 0.4 mg Route: PO;                                                       kl  

03:28 Follow up: Response: No adverse reaction                                                kl  

02:30 Drug: Potassium PO Effervescent Tablet 25 mEq Route: PO;                                kl  

03:28 Follow up: Response: No adverse reaction                                                kl  

03:14 Drug: morphine IVP or IV 2 mg Route: IVP; Infused Over: 4 mins; Site: right upper arm;  kl  

03:29 Follow up: Response: No adverse reaction; Marked relief of symptoms                       

03:15 Drug: levofloxacin IVPB 500 mg Volume: 100 ml; Route: IVPB; Infused Over: 60 mins;      kl  

      Site: right upper arm;                                                                      

04:25 Follow up: IV Status: Completed infusion; IV Intake: 100ml                              kl  

                                                                                                  

                                                                                                  

Intake:                                                                                           

03:27 IV: 250ml; Total: 250ml.                                                                kl  

04:25 IV: 100ml; Total: 350ml.                                                                kl  

                                                                                                  

Outcome:                                                                                          

02:05 ER care complete, transfer ordered by MD. adam 

04:39 Transferred by private ambulance to [facility], Note:  [facility]  

04:39 Condition: improved                                                                         

04:39 Discharge instructions given to patient, family, Instructed on the need for transfer,       

      Demonstrated understanding of instructions.                                                 

04:48 Patient left the ED.                                                                      

                                                                                                  

Signatures:                                                                                       

Dispatcher [employer]                           Shruthi Saenz, Estiven Nolan RN, MD MD cha Alexander, Jessica ja2                                                  

                                                                                                  

**************************************************************************************************